# Patient Record
Sex: FEMALE | Race: WHITE | NOT HISPANIC OR LATINO | Employment: STUDENT | URBAN - METROPOLITAN AREA
[De-identification: names, ages, dates, MRNs, and addresses within clinical notes are randomized per-mention and may not be internally consistent; named-entity substitution may affect disease eponyms.]

---
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
## 2023-01-01 ENCOUNTER — OFFICE VISIT (OUTPATIENT)
Dept: URGENT CARE | Facility: CLINIC | Age: 18
End: 2023-01-01
Payer: COMMERCIAL

## 2023-01-01 VITALS
DIASTOLIC BLOOD PRESSURE: 80 MMHG | OXYGEN SATURATION: 99 % | SYSTOLIC BLOOD PRESSURE: 108 MMHG | HEIGHT: 66 IN | RESPIRATION RATE: 18 BRPM | TEMPERATURE: 99 F | BODY MASS INDEX: 24.41 KG/M2 | WEIGHT: 151.88 LBS | HEART RATE: 67 BPM

## 2023-01-01 DIAGNOSIS — H61.23 BILATERAL IMPACTED CERUMEN: Primary | ICD-10-CM

## 2023-01-01 PROCEDURE — 3008F BODY MASS INDEX DOCD: CPT | Mod: CPTII,S$GLB,, | Performed by: NURSE PRACTITIONER

## 2023-01-01 PROCEDURE — 3074F PR MOST RECENT SYSTOLIC BLOOD PRESSURE < 130 MM HG: ICD-10-PCS | Mod: CPTII,S$GLB,, | Performed by: NURSE PRACTITIONER

## 2023-01-01 PROCEDURE — 3074F SYST BP LT 130 MM HG: CPT | Mod: CPTII,S$GLB,, | Performed by: NURSE PRACTITIONER

## 2023-01-01 PROCEDURE — 69209 EAR CERUMEN REMOVAL: ICD-10-PCS | Mod: 50,S$GLB,, | Performed by: NURSE PRACTITIONER

## 2023-01-01 PROCEDURE — 99204 PR OFFICE/OUTPT VISIT, NEW, LEVL IV, 45-59 MIN: ICD-10-PCS | Mod: 25,S$GLB,, | Performed by: NURSE PRACTITIONER

## 2023-01-01 PROCEDURE — 3079F DIAST BP 80-89 MM HG: CPT | Mod: CPTII,S$GLB,, | Performed by: NURSE PRACTITIONER

## 2023-01-01 PROCEDURE — 1159F MED LIST DOCD IN RCRD: CPT | Mod: CPTII,S$GLB,, | Performed by: NURSE PRACTITIONER

## 2023-01-01 PROCEDURE — 1160F RVW MEDS BY RX/DR IN RCRD: CPT | Mod: CPTII,S$GLB,, | Performed by: NURSE PRACTITIONER

## 2023-01-01 PROCEDURE — 3008F PR BODY MASS INDEX (BMI) DOCUMENTED: ICD-10-PCS | Mod: CPTII,S$GLB,, | Performed by: NURSE PRACTITIONER

## 2023-01-01 PROCEDURE — 1160F PR REVIEW ALL MEDS BY PRESCRIBER/CLIN PHARMACIST DOCUMENTED: ICD-10-PCS | Mod: CPTII,S$GLB,, | Performed by: NURSE PRACTITIONER

## 2023-01-01 PROCEDURE — 69209 REMOVE IMPACTED EAR WAX UNI: CPT | Mod: 50,S$GLB,, | Performed by: NURSE PRACTITIONER

## 2023-01-01 PROCEDURE — 99204 OFFICE O/P NEW MOD 45 MIN: CPT | Mod: 25,S$GLB,, | Performed by: NURSE PRACTITIONER

## 2023-01-01 PROCEDURE — 1159F PR MEDICATION LIST DOCUMENTED IN MEDICAL RECORD: ICD-10-PCS | Mod: CPTII,S$GLB,, | Performed by: NURSE PRACTITIONER

## 2023-01-01 PROCEDURE — 3079F PR MOST RECENT DIASTOLIC BLOOD PRESSURE 80-89 MM HG: ICD-10-PCS | Mod: CPTII,S$GLB,, | Performed by: NURSE PRACTITIONER

## 2023-01-01 RX ORDER — LORAZEPAM 0.5 MG/1
0.5 TABLET ORAL 2 TIMES DAILY
Status: ON HOLD | COMMUNITY
Start: 2022-12-16 | End: 2023-03-09 | Stop reason: HOSPADM

## 2023-01-01 RX ORDER — LITHIUM CARBONATE 300 MG/1
300 TABLET, FILM COATED, EXTENDED RELEASE ORAL DAILY
COMMUNITY
Start: 2022-12-16 | End: 2023-03-06

## 2023-01-01 RX ORDER — PROPRANOLOL HYDROCHLORIDE 40 MG/1
40 TABLET ORAL 2 TIMES DAILY
Status: ON HOLD | COMMUNITY
Start: 2022-12-16 | End: 2023-03-09 | Stop reason: HOSPADM

## 2023-01-01 RX ORDER — ARIPIPRAZOLE 10 MG/1
10 TABLET ORAL
COMMUNITY
Start: 2022-09-12 | End: 2023-03-06

## 2023-01-01 NOTE — PROGRESS NOTES
"Subjective:       Patient ID: Alfa Carbajal is a 17 y.o. female.    Vitals:  height is 5' 6" (1.676 m) and weight is 68.9 kg (151 lb 14.4 oz). Her temporal temperature is 98.7 °F (37.1 °C). Her blood pressure is 108/80 and her pulse is 67. Her respiration is 18 and oxygen saturation is 99%.     Chief Complaint: Ear Fullness (Left ear only)    Patient is a 18 yo female who reports L ear fullness, decreased hearing, and headache that has been present for at least a week. Patient reports that her pain is 0/10 and she has taken advil for the mild headache with no other symptoms.     Ear Fullness   There is pain in the left ear. This is a new problem. The current episode started in the past 7 days. The problem occurs constantly. The problem has been unchanged. There has been no fever. The pain is at a severity of 0/10. The patient is experiencing no pain. Associated symptoms include headaches. Pertinent negatives include no abdominal pain, coughing, diarrhea, ear discharge, hearing loss, neck pain, rash, rhinorrhea, sore throat or vomiting. She has tried NSAIDs for the symptoms. The treatment provided no relief. There is no history of a chronic ear infection, hearing loss or a tympanostomy tube.     HENT:  Negative for ear discharge, hearing loss and sore throat.    Neck: Negative for neck pain.   Respiratory:  Negative for cough.    Gastrointestinal:  Negative for abdominal pain, vomiting and diarrhea.   Skin:  Negative for rash.   Neurological:  Positive for headaches.     Objective:      Physical Exam   Constitutional: She is oriented to person, place, and time.  Non-toxic appearance. She does not appear ill. No distress. normal  HENT:   Head: Normocephalic.   Ears:   Right Ear: Tympanic membrane, external ear and ear canal normal. There is cerumen present. No tenderness. Tympanic membrane is not erythematous. No middle ear effusion. Decreased hearing is noted.   Left Ear: Tympanic membrane, external ear and ear " "canal normal. There is cerumen present. No tenderness. Tympanic membrane is not erythematous.  No middle ear effusion. Decreased hearing is noted.   Nose: Nose normal.   Mouth/Throat: Mucous membranes are moist.   Eyes: Pupils are equal, round, and reactive to light.   Cardiovascular: Normal rate and normal pulses.   Pulmonary/Chest: Effort normal.   Abdominal: Normal appearance.   Musculoskeletal: Normal range of motion.         General: Normal range of motion.   Neurological: no focal deficit. She is alert and oriented to person, place, and time.   Skin: Skin is warm, dry and not diaphoretic.   Nursing note and vitals reviewed.    Ear Cerumen Removal    Date/Time: 1/1/2023 2:30 PM  Performed by: Concepción Echeverria NP  Authorized by: Concepción Echeverria NP     Time out: Immediately prior to procedure a "time out" was called to verify the correct patient, procedure, equipment, support staff and site/side marked as required.    Consent Done?:  Yes (Verbal)    Local anesthetic:  None  Ceruminolytics applied: Ceruminolytics applied prior to the procedure    Location details:  Both ears  Procedure type: irrigation    Cerumen  Removal Results:  Cerumen completely removed  Patient tolerance:  Patient tolerated the procedure well with no immediate complications        Assessment:       1. Bilateral impacted cerumen          Plan:         Bilateral impacted cerumen  -     Ear wax removal         Patient Instructions   - You must understand that you have received an Urgent Care treatment only and that you may be released before all of your medical problems are known or treated.   - You, the patient, will arrange for follow up care as instructed.   - If your condition worsens or fails to improve we recommend that you receive another evaluation at the ER immediately or contact your PCP to discuss your concerns.   - You can call [phone] or [phone] to help schedule an appointment with the appropriate " provider.

## 2023-01-01 NOTE — PATIENT INSTRUCTIONS
- You must understand that you have received an Urgent Care treatment only and that you may be released before all of your medical problems are known or treated.   - You, the patient, will arrange for follow up care as instructed.   - If your condition worsens or fails to improve we recommend that you receive another evaluation at the ER immediately or contact your PCP to discuss your concerns.   - You can call [phone] or [phone] to help schedule an appointment with the appropriate provider.

## 2023-01-02 NOTE — PROCEDURES
"Ear Cerumen Removal    Date/Time: 1/1/2023 2:30 PM  Performed by: Concepción Echeverria NP  Authorized by: Concepción Echeverria NP     Time out: Immediately prior to procedure a "time out" was called to verify the correct patient, procedure, equipment, support staff and site/side marked as required.    Consent Done?:  Yes (Verbal)    Local anesthetic:  None  Ceruminolytics applied: Ceruminolytics applied prior to the procedure    Location details:  Both ears  Procedure type: irrigation    Cerumen  Removal Results:  Cerumen completely removed  Patient tolerance:  Patient tolerated the procedure well with no immediate complications  "

## 2023-03-06 ENCOUNTER — HOSPITAL ENCOUNTER (INPATIENT)
Facility: HOSPITAL | Age: 18
LOS: 4 days | Discharge: PSYCHIATRIC OR SUBSTANCE USE FACILITY | DRG: 918 | End: 2023-03-10
Attending: EMERGENCY MEDICINE | Admitting: STUDENT IN AN ORGANIZED HEALTH CARE EDUCATION/TRAINING PROGRAM
Payer: COMMERCIAL

## 2023-03-06 DIAGNOSIS — T50.901A OVERDOSE: ICD-10-CM

## 2023-03-06 DIAGNOSIS — S41.109A: ICD-10-CM

## 2023-03-06 DIAGNOSIS — R07.9 CHEST PAIN: ICD-10-CM

## 2023-03-06 DIAGNOSIS — R00.0 TACHYCARDIA: ICD-10-CM

## 2023-03-06 DIAGNOSIS — T65.91XA INGESTION OF TOXIC SUBSTANCE: ICD-10-CM

## 2023-03-06 PROBLEM — F31.9 BIPOLAR 1 DISORDER: Status: ACTIVE | Noted: 2023-03-06

## 2023-03-06 PROBLEM — T14.91XA SUICIDE ATTEMPT: Status: ACTIVE | Noted: 2023-03-06

## 2023-03-06 PROBLEM — T50.902A INTENTIONAL DRUG OVERDOSE: Status: ACTIVE | Noted: 2023-03-06

## 2023-03-06 LAB
ALBUMIN SERPL BCP-MCNC: 4.5 G/DL (ref 3.5–5.2)
ALP SERPL-CCNC: 92 U/L (ref 55–135)
ALT SERPL W/O P-5'-P-CCNC: 11 U/L (ref 10–44)
AMPHET+METHAMPHET UR QL: NEGATIVE
ANION GAP SERPL CALC-SCNC: 13 MMOL/L (ref 8–16)
APAP SERPL-MCNC: <3 UG/ML (ref 10–20)
AST SERPL-CCNC: 18 U/L (ref 10–40)
B-HCG UR QL: NEGATIVE
BACTERIA #/AREA URNS AUTO: ABNORMAL /HPF
BARBITURATES UR QL SCN>200 NG/ML: NEGATIVE
BASOPHILS # BLD AUTO: 0.02 K/UL (ref 0–0.2)
BASOPHILS NFR BLD: 0.3 % (ref 0–1.9)
BENZODIAZ UR QL SCN>200 NG/ML: NEGATIVE
BILIRUB SERPL-MCNC: 0.7 MG/DL (ref 0.1–1)
BILIRUB UR QL STRIP: NEGATIVE
BUN SERPL-MCNC: 13 MG/DL (ref 6–20)
BUN SERPL-MCNC: 19 MG/DL (ref 6–30)
BZE UR QL SCN: NEGATIVE
CALCIUM SERPL-MCNC: 10 MG/DL (ref 8.7–10.5)
CANNABINOIDS UR QL SCN: ABNORMAL
CHLORIDE SERPL-SCNC: 106 MMOL/L (ref 95–110)
CHLORIDE SERPL-SCNC: 106 MMOL/L (ref 95–110)
CLARITY UR REFRACT.AUTO: CLEAR
CO2 SERPL-SCNC: 20 MMOL/L (ref 23–29)
COLOR UR AUTO: YELLOW
CREAT SERPL-MCNC: 0.9 MG/DL (ref 0.5–1.4)
CREAT SERPL-MCNC: 0.9 MG/DL (ref 0.5–1.4)
CREAT UR-MCNC: 335 MG/DL (ref 15–325)
CTP QC/QA: YES
DIFFERENTIAL METHOD: ABNORMAL
EOSINOPHIL # BLD AUTO: 0 K/UL (ref 0–0.5)
EOSINOPHIL NFR BLD: 0.1 % (ref 0–8)
ERYTHROCYTE [DISTWIDTH] IN BLOOD BY AUTOMATED COUNT: 12.3 % (ref 11.5–14.5)
EST. GFR  (NO RACE VARIABLE): >60 ML/MIN/1.73 M^2
ETHANOL SERPL-MCNC: <10 MG/DL
GLUCOSE SERPL-MCNC: 82 MG/DL (ref 70–110)
GLUCOSE SERPL-MCNC: 91 MG/DL (ref 70–110)
GLUCOSE UR QL STRIP: NEGATIVE
HCT VFR BLD AUTO: 44.7 % (ref 37–48.5)
HCT VFR BLD CALC: 43 %PCV (ref 36–54)
HCV AB SERPL QL IA: NORMAL
HGB BLD-MCNC: 14.4 G/DL (ref 12–16)
HGB UR QL STRIP: NEGATIVE
HIV 1+2 AB+HIV1 P24 AG SERPL QL IA: NORMAL
HYALINE CASTS UR QL AUTO: 0 /LPF
IMM GRANULOCYTES # BLD AUTO: 0.03 K/UL (ref 0–0.04)
IMM GRANULOCYTES NFR BLD AUTO: 0.4 % (ref 0–0.5)
KETONES UR QL STRIP: ABNORMAL
LEUKOCYTE ESTERASE UR QL STRIP: ABNORMAL
LITHIUM SERPL-SCNC: 0.6 MMOL/L (ref 0.6–1.2)
LITHIUM SERPL-SCNC: 0.7 MMOL/L (ref 0.6–1.2)
LITHIUM SERPL-SCNC: 0.8 MMOL/L (ref 0.6–1.2)
LYMPHOCYTES # BLD AUTO: 0.7 K/UL (ref 1–4.8)
LYMPHOCYTES NFR BLD: 9.6 % (ref 18–48)
MAGNESIUM SERPL-MCNC: 2.1 MG/DL (ref 1.6–2.6)
MCH RBC QN AUTO: 29.4 PG (ref 27–31)
MCHC RBC AUTO-ENTMCNC: 32.2 G/DL (ref 32–36)
MCV RBC AUTO: 91 FL (ref 82–98)
METHADONE UR QL SCN>300 NG/ML: NEGATIVE
MICROSCOPIC COMMENT: ABNORMAL
MONOCYTES # BLD AUTO: 0.3 K/UL (ref 0.3–1)
MONOCYTES NFR BLD: 4.4 % (ref 4–15)
NEUTROPHILS # BLD AUTO: 6.6 K/UL (ref 1.8–7.7)
NEUTROPHILS NFR BLD: 85.2 % (ref 38–73)
NITRITE UR QL STRIP: NEGATIVE
NRBC BLD-RTO: 0 /100 WBC
OPIATES UR QL SCN: NEGATIVE
PCP UR QL SCN>25 NG/ML: NEGATIVE
PH UR STRIP: 7 [PH] (ref 5–8)
PLATELET # BLD AUTO: 247 K/UL (ref 150–450)
PMV BLD AUTO: 10.9 FL (ref 9.2–12.9)
POC IONIZED CALCIUM: 1.26 MMOL/L (ref 1.06–1.42)
POC TCO2 (MEASURED): 27 MMOL/L (ref 23–29)
POTASSIUM BLD-SCNC: 6.1 MMOL/L (ref 3.5–5.1)
POTASSIUM SERPL-SCNC: 4 MMOL/L (ref 3.5–5.1)
PROT SERPL-MCNC: 7.5 G/DL (ref 6–8.4)
PROT UR QL STRIP: ABNORMAL
RBC # BLD AUTO: 4.9 M/UL (ref 4–5.4)
RBC #/AREA URNS AUTO: 6 /HPF (ref 0–4)
SALICYLATES SERPL-MCNC: <5 MG/DL (ref 15–30)
SAMPLE: ABNORMAL
SODIUM BLD-SCNC: 138 MMOL/L (ref 136–145)
SODIUM SERPL-SCNC: 139 MMOL/L (ref 136–145)
SP GR UR STRIP: 1.03 (ref 1–1.03)
SQUAMOUS #/AREA URNS AUTO: 3 /HPF
TOXICOLOGY INFORMATION: ABNORMAL
TSH SERPL DL<=0.005 MIU/L-ACNC: 0.44 UIU/ML (ref 0.4–4)
URN SPEC COLLECT METH UR: ABNORMAL
WBC # BLD AUTO: 7.69 K/UL (ref 3.9–12.7)
WBC #/AREA URNS AUTO: 13 /HPF (ref 0–5)

## 2023-03-06 PROCEDURE — 82077 ASSAY SPEC XCP UR&BREATH IA: CPT

## 2023-03-06 PROCEDURE — 87086 URINE CULTURE/COLONY COUNT: CPT

## 2023-03-06 PROCEDURE — P9612 CATHETERIZE FOR URINE SPEC: HCPCS

## 2023-03-06 PROCEDURE — 99291 CRITICAL CARE FIRST HOUR: CPT | Mod: 25,,, | Performed by: EMERGENCY MEDICINE

## 2023-03-06 PROCEDURE — 80337 TRICYCLIC & CYCLICALS 6/MORE: CPT

## 2023-03-06 PROCEDURE — 99291 PR CRITICAL CARE, E/M 30-74 MINUTES: ICD-10-PCS | Mod: 25,,, | Performed by: EMERGENCY MEDICINE

## 2023-03-06 PROCEDURE — 83735 ASSAY OF MAGNESIUM: CPT

## 2023-03-06 PROCEDURE — 99223 1ST HOSP IP/OBS HIGH 75: CPT | Mod: ,,, | Performed by: STUDENT IN AN ORGANIZED HEALTH CARE EDUCATION/TRAINING PROGRAM

## 2023-03-06 PROCEDURE — 80053 COMPREHEN METABOLIC PANEL: CPT

## 2023-03-06 PROCEDURE — 85025 COMPLETE CBC W/AUTO DIFF WBC: CPT | Performed by: EMERGENCY MEDICINE

## 2023-03-06 PROCEDURE — 80047 BASIC METABLC PNL IONIZED CA: CPT

## 2023-03-06 PROCEDURE — 93010 ELECTROCARDIOGRAM REPORT: CPT | Mod: ,,, | Performed by: INTERNAL MEDICINE

## 2023-03-06 PROCEDURE — 96374 THER/PROPH/DIAG INJ IV PUSH: CPT

## 2023-03-06 PROCEDURE — 80178 ASSAY OF LITHIUM: CPT | Performed by: EMERGENCY MEDICINE

## 2023-03-06 PROCEDURE — 12002 RPR S/N/AX/GEN/TRNK2.6-7.5CM: CPT | Mod: ,,, | Performed by: EMERGENCY MEDICINE

## 2023-03-06 PROCEDURE — 80143 DRUG ASSAY ACETAMINOPHEN: CPT

## 2023-03-06 PROCEDURE — 80178 ASSAY OF LITHIUM: CPT | Mod: 91 | Performed by: EMERGENCY MEDICINE

## 2023-03-06 PROCEDURE — 80179 DRUG ASSAY SALICYLATE: CPT

## 2023-03-06 PROCEDURE — 63600175 PHARM REV CODE 636 W HCPCS: Performed by: STUDENT IN AN ORGANIZED HEALTH CARE EDUCATION/TRAINING PROGRAM

## 2023-03-06 PROCEDURE — 99285 EMERGENCY DEPT VISIT HI MDM: CPT | Mod: 25

## 2023-03-06 PROCEDURE — 84443 ASSAY THYROID STIM HORMONE: CPT

## 2023-03-06 PROCEDURE — 25000003 PHARM REV CODE 250

## 2023-03-06 PROCEDURE — 99223 PR INITIAL HOSPITAL CARE,LEVL III: ICD-10-PCS | Mod: ,,, | Performed by: STUDENT IN AN ORGANIZED HEALTH CARE EDUCATION/TRAINING PROGRAM

## 2023-03-06 PROCEDURE — 80307 DRUG TEST PRSMV CHEM ANLYZR: CPT

## 2023-03-06 PROCEDURE — 25000003 PHARM REV CODE 250: Performed by: STUDENT IN AN ORGANIZED HEALTH CARE EDUCATION/TRAINING PROGRAM

## 2023-03-06 PROCEDURE — 81001 URINALYSIS AUTO W/SCOPE: CPT

## 2023-03-06 PROCEDURE — 96361 HYDRATE IV INFUSION ADD-ON: CPT

## 2023-03-06 PROCEDURE — 93010 EKG 12-LEAD: ICD-10-PCS | Mod: ,,, | Performed by: INTERNAL MEDICINE

## 2023-03-06 PROCEDURE — 12002 RPR S/N/AX/GEN/TRNK2.6-7.5CM: CPT

## 2023-03-06 PROCEDURE — 81025 URINE PREGNANCY TEST: CPT

## 2023-03-06 PROCEDURE — 80178 ASSAY OF LITHIUM: CPT | Mod: 91

## 2023-03-06 PROCEDURE — 20600001 HC STEP DOWN PRIVATE ROOM

## 2023-03-06 PROCEDURE — 86803 HEPATITIS C AB TEST: CPT | Performed by: EMERGENCY MEDICINE

## 2023-03-06 PROCEDURE — 12002 PR RESUP NPTERF WND BODY 2.6-7.5 CM: ICD-10-PCS | Mod: ,,, | Performed by: EMERGENCY MEDICINE

## 2023-03-06 PROCEDURE — 87389 HIV-1 AG W/HIV-1&-2 AB AG IA: CPT | Performed by: EMERGENCY MEDICINE

## 2023-03-06 PROCEDURE — 63600175 PHARM REV CODE 636 W HCPCS

## 2023-03-06 PROCEDURE — 93005 ELECTROCARDIOGRAM TRACING: CPT

## 2023-03-06 RX ORDER — DEXTROSE 40 %
30 GEL (GRAM) ORAL
Status: DISCONTINUED | OUTPATIENT
Start: 2023-03-06 | End: 2023-03-10 | Stop reason: HOSPADM

## 2023-03-06 RX ORDER — DEXTROSE 40 %
15 GEL (GRAM) ORAL
Status: DISCONTINUED | OUTPATIENT
Start: 2023-03-06 | End: 2023-03-10 | Stop reason: HOSPADM

## 2023-03-06 RX ORDER — SODIUM CHLORIDE 9 MG/ML
INJECTION, SOLUTION INTRAVENOUS CONTINUOUS
Status: DISCONTINUED | OUTPATIENT
Start: 2023-03-06 | End: 2023-03-08

## 2023-03-06 RX ORDER — PROMETHAZINE HYDROCHLORIDE 25 MG/1
25 TABLET ORAL EVERY 6 HOURS PRN
Status: DISCONTINUED | OUTPATIENT
Start: 2023-03-06 | End: 2023-03-10 | Stop reason: HOSPADM

## 2023-03-06 RX ORDER — ONDANSETRON 2 MG/ML
4 INJECTION INTRAMUSCULAR; INTRAVENOUS EVERY 8 HOURS PRN
Status: DISCONTINUED | OUTPATIENT
Start: 2023-03-06 | End: 2023-03-10 | Stop reason: HOSPADM

## 2023-03-06 RX ORDER — MEDROXYPROGESTERONE ACETATE 10 MG/1
10 TABLET ORAL DAILY
Status: DISCONTINUED | OUTPATIENT
Start: 2023-03-07 | End: 2023-03-07

## 2023-03-06 RX ORDER — ARIPIPRAZOLE 15 MG/1
15 TABLET ORAL DAILY
Status: ON HOLD | COMMUNITY
Start: 2023-01-27 | End: 2023-03-09 | Stop reason: HOSPADM

## 2023-03-06 RX ORDER — ONDANSETRON 2 MG/ML
4 INJECTION INTRAMUSCULAR; INTRAVENOUS
Status: COMPLETED | OUTPATIENT
Start: 2023-03-06 | End: 2023-03-06

## 2023-03-06 RX ORDER — GLUCAGON 1 MG
1 KIT INJECTION
Status: DISCONTINUED | OUTPATIENT
Start: 2023-03-06 | End: 2023-03-10 | Stop reason: HOSPADM

## 2023-03-06 RX ORDER — SODIUM CHLORIDE 0.9 % (FLUSH) 0.9 %
10 SYRINGE (ML) INJECTION EVERY 12 HOURS PRN
Status: DISCONTINUED | OUTPATIENT
Start: 2023-03-06 | End: 2023-03-10 | Stop reason: HOSPADM

## 2023-03-06 RX ORDER — LITHIUM CARBONATE 300 MG/1
900 TABLET, FILM COATED, EXTENDED RELEASE ORAL NIGHTLY
Status: ON HOLD | COMMUNITY
Start: 2023-02-03 | End: 2023-03-09 | Stop reason: HOSPADM

## 2023-03-06 RX ORDER — NALOXONE HCL 0.4 MG/ML
0.02 VIAL (ML) INJECTION
Status: DISCONTINUED | OUTPATIENT
Start: 2023-03-06 | End: 2023-03-10 | Stop reason: HOSPADM

## 2023-03-06 RX ORDER — ACETAMINOPHEN 325 MG/1
650 TABLET ORAL EVERY 4 HOURS PRN
Status: DISCONTINUED | OUTPATIENT
Start: 2023-03-06 | End: 2023-03-10 | Stop reason: HOSPADM

## 2023-03-06 RX ADMIN — ONDANSETRON 4 MG: 2 INJECTION INTRAMUSCULAR; INTRAVENOUS at 01:03

## 2023-03-06 RX ADMIN — SODIUM CHLORIDE: 9 INJECTION, SOLUTION INTRAVENOUS at 09:03

## 2023-03-06 RX ADMIN — SODIUM CHLORIDE, POTASSIUM CHLORIDE, SODIUM LACTATE AND CALCIUM CHLORIDE 1000 ML: 600; 310; 30; 20 INJECTION, SOLUTION INTRAVENOUS at 03:03

## 2023-03-06 RX ADMIN — SODIUM CHLORIDE 1000 ML: 9 INJECTION, SOLUTION INTRAVENOUS at 08:03

## 2023-03-06 RX ADMIN — SODIUM CHLORIDE 1000 ML: 9 INJECTION, SOLUTION INTRAVENOUS at 01:03

## 2023-03-06 NOTE — ED TRIAGE NOTES
Pt arrives via EMS from [facility]. EMS reports pt ingested 3x bottles of Lithium + 2x bottles of Ativan. 97% on RA. Known psychiatric hx. Patient states she stopped taking all of her meds because she refuses to take them, then today she took them all in SI attempt. Patient has cuts noted to both wrist and left upper arm. Patient has hx of self harm.

## 2023-03-06 NOTE — LETTER
March 10, 2023    Sailaja Carbajal  [address]  [place] [address]                   [address] [place] [address]  [facility] [address]  Phone: [phone]   March 10, 2023     Patient: Sailaja Carbajal   YOB: 2005   Date of Visit: 3/6/2023       To Whom it May Concern:    Sailaja Carbajal was admitted to [facility] from 3/6 to 3/10 under the hospital medicine service.    Please excuse her from any classes or work missed.    If you have any questions or concerns, please don't hesitate to call.    Sincerely,         Darshana Spann MD

## 2023-03-06 NOTE — PROVIDER PROGRESS NOTES - EMERGENCY DEPT.
"ED Resident HAND-OFF NOTE:  2:11 PM 3/6/2023  Sailaja Carbajal is a 17 y.o. female who presented to the ED on 3/6/2023 and has been managed by  and Dr. Allen, who reports patient C/O drug overdose. I assumed care of patient from off-going ED physician team at 2:11 PM pending repeat lithium level, final dispo.    On my evaluation, Sailaja Carbajal appears ill and is hemodynamically stable. Thus far, Sailaja Carbajal has received:  Medications   Tdap (BOOSTRIX) vaccine injection 0.5 mL (has no administration in time range)   sodium chloride 0.9% bolus 1,000 mL 1,000 mL (1,000 mLs Intravenous New Bag 3/6/23 1306)   ondansetron injection 4 mg (4 mg Intravenous Given 3/6/23 1329)       On my exam, I appreciate:  /89 (BP Location: Right arm, Patient Position: Lying)   Pulse 99   Temp 98.2 °F (36.8 °C) (Oral)   Resp 20   Ht 5' 6" (1.676 m)   Wt 68.9 kg (152 lb)   SpO2 95%   BMI 24.53 kg/m²     ED Course as of 03/06/23 1453   Mon Mar 06, 2023   1348 Lithium Level: 0.8  Normal  [AB]   1405 POC Creatinine: 0.9  Normal renal function  [AB]   1405 POC Potassium(!): 6.1  Likely hemolysis - no EKG changes, normal renal function.  [AB]   1424 CO2(!): 20 [AW]   1430 Potassium: 4.0 [AW]      ED Course User Index  [AB] Amrit Aj MD  [AW] Jeancarlos Allen MD      Lac Repair    Date/Time: 3/6/2023 2:55 PM  Performed by: Juan A Rangel MD  Authorized by: Amrit Aj MD     Consent:     Consent obtained:  Verbal  Laceration details:     Location:  Shoulder/arm    Shoulder/arm location:  L lower arm    Length (cm):  4  Exploration:     Wound extent: no muscle damage noted, no tendon damage noted and no vascular damage noted    Treatment:     Amount of cleaning:  Standard    Irrigation solution:  Sterile saline    Irrigation method:  Pressure wash    Debridement:  None  Skin repair:     Repair method:  Sutures    Suture size:  4-0    Suture material:  Nylon    Number of " sutures:  4  Approximation:     Approximation:  Close  Repair type:     Repair type:  Simple  Post-procedure details:     Dressing:  Open (no dressing)    Procedure completion:  Tolerated well, no immediate complications      Additional ED course:  2:56 PM  Upon re-evaluation, patient is sleepy appearing but answering questions, easily arousable.  Hemodynamically stable.  Repeat lithium, x-ray of the abdomen ordered as well as additional IV fluids.  Laceration repaired.  Repeat lithium level not significantly up trending.  Patient admitted to step-down unit    Disposition:  Admit  I have discussed and counseled Sailaja Carbajal regarding exam, results, diagnosis, treatment, and plan.  ______________________  Juan A Rangel MD   Emergency Medicine Resident  3/6/2023      Attending Note:  Physician Attestation Statement: I have personally seen and examined this patient. As the supervising MD I agree with the above history. As the supervising MD I agree with the above PE. As the supervising MD I agree with the above medical decision making, treatment, course, plan, and disposition.  I was present for the key moments of laceration repair and was immediately available otherwise.    Critical Care   Date: 03/06/2023  Performed by: Wilmer Rollins MD   Authorized by: Wilmer Rollins MD    Total critical care time (exclusive of procedural time) : 30 minutes  Critical care was necessary to treat or prevent imminent or life-threatening deterioration of the following conditions:  overdose

## 2023-03-06 NOTE — ED NOTES
I-STAT Chem-8+ Results:   Value Reference Range   Sodium 138 136-145 mmol/L   Potassium  6.1 3.5-5.1 mmol/L   Chloride 106  mmol/L   Ionized Calcium 1.26 1.06-1.42 mmol/L   CO2 (measured) 27 23-29 mmol/L   Glucose 82  mg/dL   BUN 19 6-30 mg/dL   Creatinine 0.9 0.5-1.4 mg/dL   Hematocrit 43 36-54%

## 2023-03-06 NOTE — ED NOTES
Pt's security envelope and clothes given to her aunt to bring home. Pt has no belongings in the ED.

## 2023-03-06 NOTE — ED NOTES
Pt is followed by Jodee almeida at [facility] and Candy William MD at [facility].

## 2023-03-06 NOTE — ED NOTES
Pt belongings: shirt, pants, shoes, sweatshirt. All locked in the closet.   Security envelope # [id number] wallet and keys. No money, ID and several cards. Locked in the [facility] safe.

## 2023-03-06 NOTE — ED PROVIDER NOTES
Encounter Date: 3/6/2023       History     Chief Complaint   Patient presents with    Drug Overdose     Pt via EMS from [facility]. EMS reports pt ingested 3x bottles of Lithium + 2x bottles of Ativan. 97% on RA. Known psychiatric hx.     The history is provided by the patient and medical records. No  was used.   Sailaja Carbajal is a 17 y.o. female with history of bipolar 1, anorexia, depression, presenting with chief complaint of drug overdose.  Patient states that she is prescribed lithium, Ativan, aripiprazole for bipolar 1 and anorexia.  She is only been taking her aripiprazole for the past few weeks to months.  Patient states at about 11:00 a.m. on 03/06/2023 she took 3 bottles of 300 mg lithium tablets and 2 bottles of 10 mg Ativan tablets.  Patient has been depressed recently and admits this was a suicide attempt.  She does not have a history of prior suicide attempts but has significant history of self-harm including self cutting.  She also used a  to cut her forearms.     Review of patient's allergies indicates:  No Known Allergies  Past Medical History:   Diagnosis Date    Anxiety     Depression      History reviewed. No pertinent surgical history.  Family History   Problem Relation Age of Onset    No Known Problems Father     No Known Problems Mother      Social History     Tobacco Use    Smoking status: Never     Passive exposure: Never    Smokeless tobacco: Never   Substance Use Topics    Alcohol use: Never    Drug use: Never     Review of Systems   Skin:  Positive for wound.   Psychiatric/Behavioral:  Positive for self-injury and suicidal ideas.      Physical Exam     Initial Vitals   BP Pulse Resp Temp SpO2   03/06/23 1252 03/06/23 1252 03/06/23 1252 03/06/23 1335 03/06/23 1252   116/76 (!) 116 16 98.2 °F (36.8 °C) 97 %      MAP       --                Physical Exam    Nursing note and vitals reviewed.  Constitutional: She appears  well-developed and well-nourished. She is not diaphoretic. No distress.   Fatigued but arousable   HENT:   Head: Normocephalic and atraumatic.   Mouth/Throat: Oropharynx is clear and moist.   Protecting airway, carries on conversation, speaking full sentences   Eyes: Conjunctivae are normal. Pupils are equal, round, and reactive to light. No scleral icterus.   3 mm equal round reactive pupils   Neck: Neck supple. No tracheal deviation present.   Cardiovascular:  Regular rhythm, normal heart sounds and intact distal pulses.     Exam reveals no gallop and no friction rub.       No murmur heard.  Tachycardia   Pulmonary/Chest: Breath sounds normal. No stridor. No respiratory distress. She has no wheezes. She has no rhonchi. She has no rales.   Abdominal: Abdomen is soft. Bowel sounds are normal. She exhibits no distension. There is no abdominal tenderness.   Musculoskeletal:         General: No tenderness or edema.      Cervical back: Neck supple.     Neurological: She is alert and oriented to person, place, and time. GCS score is 15. GCS eye subscore is 4. GCS verbal subscore is 5. GCS motor subscore is 6.   Drowsy   Skin: Skin is warm and dry. Capillary refill takes less than 2 seconds. No rash noted. No erythema.   Psychiatric: She has a normal mood and affect. Thought content normal.       ED Course   Procedures  Labs Reviewed   COMPREHENSIVE METABOLIC PANEL - Abnormal; Notable for the following components:       Result Value    CO2 20 (*)     All other components within normal limits   URINALYSIS, REFLEX TO URINE CULTURE - Abnormal; Notable for the following components:    Protein, UA 1+ (*)     Ketones, UA 3+ (*)     Leukocytes, UA Trace (*)     All other components within normal limits    Narrative:     Specimen Source->Urine   DRUG SCREEN PANEL, URINE EMERGENCY - Abnormal; Notable for the following components:    THC Presumptive Positive (*)     Creatinine, Urine 335.0 (*)     All other components within normal  limits    Narrative:     Specimen Source->Urine   ACETAMINOPHEN LEVEL - Abnormal; Notable for the following components:    Acetaminophen (Tylenol), Serum <3.0 (*)     All other components within normal limits   SALICYLATE LEVEL - Abnormal; Notable for the following components:    Salicylate Lvl <5.0 (*)     All other components within normal limits   CBC W/ AUTO DIFFERENTIAL - Abnormal; Notable for the following components:    Lymph # 0.7 (*)     Gran % 85.2 (*)     Lymph % 9.6 (*)     All other components within normal limits   URINALYSIS MICROSCOPIC - Abnormal; Notable for the following components:    RBC, UA 6 (*)     WBC, UA 13 (*)     All other components within normal limits    Narrative:     Specimen Source->Urine   ISTAT PROCEDURE - Abnormal; Notable for the following components:    POC Potassium 6.1 (*)     All other components within normal limits   POCT URINE PREGNANCY - Normal   CULTURE, URINE   HIV 1 / 2 ANTIBODY    Narrative:     Release to patient->Immediate   HEPATITIS C ANTIBODY    Narrative:     Release to patient->Immediate   TSH   ALCOHOL,MEDICAL (ETHANOL)   LITHIUM LEVEL   MAGNESIUM   LITHIUM LEVEL   LITHIUM LEVEL   TRICYCLIC ANTIDEPRESSANT SCREEN (REF LAB)   ISTAT CHEM8     EKG Readings: (Independently Interpreted)   Initial Reading: No STEMI. Rhythm: Sinus Tachycardia. Heart Rate: 110. Ectopy: No Ectopy. Conduction: Normal. ST Segments: Normal ST Segments. T Waves: Normal. Clinical Impression: Sinus Tachycardia   QRS 90     ECG Results              EKG 12-lead (Final result)  Result time 03/06/23 14:20:23      Final result by Interface, Lab In [facility] (03/06/23 14:20:23)                   Narrative:    Test Reason : T50.901A,    Vent. Rate : 110 BPM     Atrial Rate : 110 BPM     P-R Int : 142 ms          QRS Dur : 090 ms      QT Int : 334 ms       P-R-T Axes : 080 080 056 degrees     QTc Int : 452 ms    Sinus tachycardia  Biatrial enlargement  Right ventricular conduction  delay  Abnormal ECG  No previous ECGs available  Confirmed by Hailey Garcia MD ([id number]) on 3/6/2023 2:20:16 PM    Referred By: System System           Confirmed By:Hailey Garcia MD                                  Imaging Results              X-Ray Abdomen AP 1 View (KUB) (Final result)  Result time 03/06/23 15:44:11      Final result by Bienvenido Can MD (03/06/23 15:44:11)                   Impression:      1. Nonobstructive bowel gas pattern.      Electronically signed by: Bienvenido Can MD  Date:    03/06/2023  Time:    15:44               Narrative:    EXAMINATION:  XR ABDOMEN AP 1 VIEW    CLINICAL HISTORY:  Toxic effect of unspecified substance, accidental (unintentional), initial encounter    TECHNIQUE:  AP View(s) of the abdomen was performed.    COMPARISON:  None    FINDINGS:  Single-view abdomen supine.    Air and stool is seen within the large bowel and projected over the rectum.  No focally dilated small bowel loops.  There are no calcifications to convincingly suggest nephrolithiasis or cholelithiasis.  There is levo scoliotic curvature of the spine.  No findings to suggest pneumatosis.                                       X-Ray Forearm Left (Final result)  Result time 03/06/23 15:44:59      Final result by Bienvenido Can MD (03/06/23 15:44:59)                   Impression:      As above      Electronically signed by: Bienvenido Can MD  Date:    03/06/2023  Time:    15:44               Narrative:    EXAMINATION:  XR FOREARM LEFT    CLINICAL HISTORY:  Unspecified open wound of unspecified upper arm, initial encounter    TECHNIQUE:  AP and lateral views of the left forearm were performed.    COMPARISON:  None    FINDINGS:  Two views left forearm.    No acute displaced fracture or dislocation of the forearm.  No radiopaque foreign body.  There is subcutaneous emphysema along the distal ulnar aspect of the forearm, correlation with any laceration in the region.                                        Medications   Tdap (BOOSTRIX) vaccine injection 0.5 mL (has no administration in time range)   sodium chloride 0.9% bolus 1,000 mL 1,000 mL (0 mLs Intravenous Stopped 3/6/23 1406)   ondansetron injection 4 mg (4 mg Intravenous Given 3/6/23 1329)   lactated ringers bolus 1,000 mL (0 mLs Intravenous Stopped 3/6/23 1736)     Medical Decision Making:   History:   I obtained history from: someone other than patient and EMS provider.       <> Summary of History: Took 3 bottles of lithium and 2 bottles of Ativan  Old Medical Records: I decided to obtain old medical records.  Initial Assessment:   17-year-old female with history of bipolar disorder here after suicide attempt by toxic ingestion  Differential Diagnosis:   Including, but not limited to: Intentional polysubstance overdose, intentional single substance overdose, laceration   Independently Interpreted Test(s):   I have ordered and independently interpreted EKG Reading(s) - see prior notes  Clinical Tests:   Lab Tests: Ordered and Reviewed  Radiological Study: Ordered and Reviewed  Medical Tests: Reviewed and Ordered  ED Management:  Toxicology/psych workup started for an acute presentation of an emergent condition with multiple comorbidities.  Upon arrival to the ED patient is alert but tired and is protecting her airway. She was PEC'd for suicidality and suicide attempt.  She is hemodynamically stable at this time and has minimal GI complaints, Zofran given for nausea.  Discussed with Poison Control who recommended against activated charcoal or total bowel irrigation due to patient's depressed med status and normal initial lithium level at 0.8.  Will repeat lithium level 6 hours after ingestion and reassess need for whole bowel irrigation and/or dialysis at that time.  Lithium level scheduled for 1700 which would be 6 hours after reported ingestion.  Patient kept on intensive cardiopulmonary monitoring while in the ED. Patient signed out to oncoming team  at 2:00 p.m. pending labs and repeat lithium level which will determine final disposition.  Other:   I have discussed this case with another health care provider.       <> Summary of the Discussion: Poison control          Attending Attestation:   Physician Attestation Statement for Resident:  As the supervising MD   Physician Attestation Statement: I have personally seen and examined this patient.   I agree with the above history.  -: 16 yo female presenting from [facility] for intentional overdose.  Reports taking lithium and ativan.  Also prescribed Abilify, propranolol.  Used a  to cut her forearms.     As the supervising MD I agree with the above PE.   -: Sleeping, but wakes easily to voice  Scars to left shoulder and right upper leg  Superficial abrasions to right forearm  Superficial abrasions and 2 cm laceration to left forearm - no active bleeding, no tendon exposure, full extension/flexion of wrist  Tachycardia, lungs clear  Abdomen non-tender  Mood is depressed, she is evasive    As the supervising MD I agree with the above treatment, course, plan, and disposition.   -: Considered intubation, but not clinically indicated at this time, protecting airway and conversive once awake.   EKG sinus tachycardia.  Do not suspect BB OD given she is tachycardic.   Case discussed with Poison Control.  Considered risk/benefit of both activated charcoal and WBI, but due to aspiration risk and initial negative lithium level, will hold.  Expecting lithium level to be elevated given reported amount ingested about 2 hours PTA, but will trend closely for increase and reassess decision for WBI.  PEC placed out of concern patient represents a danger to herself, as evidenced by reported ingestion and e/o self harm on exam.  Signed out to oncoming team at 2p with repeat lithium level, remaining labs, laceration repair pending.  Tdap updated.  Xray pending to r/o FB or bony injury to left forearm.    Anticipate admission to Hospital Medicine, although would monitor clinical status closely for ICU need.    I have reviewed and agree with the residents interpretation of the following: lab data and EKG.  I have reviewed the following: old records at this facility.              ED Course as of 03/06/23 1841   Mon Mar 06, 2023   1348 Lithium Level: 0.8  Normal  [AB]   1405 POC Creatinine: 0.9  Normal renal function  [AB]   1405 POC Potassium(!): 6.1  Likely hemolysis - no EKG changes, normal renal function.  [AB]   1424 CO2(!): 20 [AW]   1430 Potassium: 4.0 [AW]      ED Course User Index  [AB] Amrit Aj MD  [AW] Jeancarlos Allen MD                 Clinical Impression:   Final diagnoses:  [T50.901A] Overdose  [S41.109A] Open wound of arm  [T65.91XA] Ingestion of toxic substance        ED Disposition Condition    Admit                 Jeancarlos Allen MD  Resident  03/06/23 1431       Amrit Aj MD  03/06/23 1849

## 2023-03-06 NOTE — ED NOTES
Patient placed in blue paper scrubs, patient placed on cardiac monitor, bp cuff, and continuous pulse ox. Sitter at bedside.

## 2023-03-07 PROBLEM — R63.0 ANOREXIA: Status: ACTIVE | Noted: 2023-03-07

## 2023-03-07 LAB
ALBUMIN SERPL BCP-MCNC: 3.3 G/DL (ref 3.5–5.2)
ALP SERPL-CCNC: 74 U/L (ref 55–135)
ALT SERPL W/O P-5'-P-CCNC: 9 U/L (ref 10–44)
ANION GAP SERPL CALC-SCNC: 8 MMOL/L (ref 8–16)
AST SERPL-CCNC: 14 U/L (ref 10–40)
BACTERIA UR CULT: NO GROWTH
BASOPHILS # BLD AUTO: 0.04 K/UL (ref 0–0.2)
BASOPHILS NFR BLD: 0.5 % (ref 0–1.9)
BILIRUB SERPL-MCNC: 0.9 MG/DL (ref 0.1–1)
BUN SERPL-MCNC: 13 MG/DL (ref 6–20)
CALCIUM SERPL-MCNC: 8.7 MG/DL (ref 8.7–10.5)
CHLORIDE SERPL-SCNC: 114 MMOL/L (ref 95–110)
CO2 SERPL-SCNC: 20 MMOL/L (ref 23–29)
CREAT SERPL-MCNC: 0.8 MG/DL (ref 0.5–1.4)
DIFFERENTIAL METHOD: NORMAL
EOSINOPHIL # BLD AUTO: 0.1 K/UL (ref 0–0.5)
EOSINOPHIL NFR BLD: 1.3 % (ref 0–8)
ERYTHROCYTE [DISTWIDTH] IN BLOOD BY AUTOMATED COUNT: 11.9 % (ref 11.5–14.5)
EST. GFR  (NO RACE VARIABLE): >60 ML/MIN/1.73 M^2
GLUCOSE SERPL-MCNC: 54 MG/DL (ref 70–110)
HCT VFR BLD AUTO: 38.8 % (ref 37–48.5)
HGB BLD-MCNC: 12.4 G/DL (ref 12–16)
IMM GRANULOCYTES # BLD AUTO: 0.03 K/UL (ref 0–0.04)
IMM GRANULOCYTES NFR BLD AUTO: 0.4 % (ref 0–0.5)
LYMPHOCYTES # BLD AUTO: 1.8 K/UL (ref 1–4.8)
LYMPHOCYTES NFR BLD: 20.9 % (ref 18–48)
MAGNESIUM SERPL-MCNC: 1.8 MG/DL (ref 1.6–2.6)
MCH RBC QN AUTO: 29.8 PG (ref 27–31)
MCHC RBC AUTO-ENTMCNC: 32 G/DL (ref 32–36)
MCV RBC AUTO: 93 FL (ref 82–98)
MONOCYTES # BLD AUTO: 0.7 K/UL (ref 0.3–1)
MONOCYTES NFR BLD: 8 % (ref 4–15)
NEUTROPHILS # BLD AUTO: 5.8 K/UL (ref 1.8–7.7)
NEUTROPHILS NFR BLD: 68.9 % (ref 38–73)
NRBC BLD-RTO: 0 /100 WBC
PLATELET # BLD AUTO: 182 K/UL (ref 150–450)
PMV BLD AUTO: 9.9 FL (ref 9.2–12.9)
POCT GLUCOSE: 53 MG/DL (ref 70–110)
POCT GLUCOSE: 95 MG/DL (ref 70–110)
POTASSIUM SERPL-SCNC: 3.8 MMOL/L (ref 3.5–5.1)
PROT SERPL-MCNC: 5.5 G/DL (ref 6–8.4)
RBC # BLD AUTO: 4.16 M/UL (ref 4–5.4)
SODIUM SERPL-SCNC: 142 MMOL/L (ref 136–145)
WBC # BLD AUTO: 8.42 K/UL (ref 3.9–12.7)

## 2023-03-07 PROCEDURE — 20600001 HC STEP DOWN PRIVATE ROOM

## 2023-03-07 PROCEDURE — 25000003 PHARM REV CODE 250: Performed by: STUDENT IN AN ORGANIZED HEALTH CARE EDUCATION/TRAINING PROGRAM

## 2023-03-07 PROCEDURE — 99232 PR SUBSEQUENT HOSPITAL CARE,LEVL II: ICD-10-PCS | Mod: ,,, | Performed by: STUDENT IN AN ORGANIZED HEALTH CARE EDUCATION/TRAINING PROGRAM

## 2023-03-07 PROCEDURE — 85025 COMPLETE CBC W/AUTO DIFF WBC: CPT | Performed by: STUDENT IN AN ORGANIZED HEALTH CARE EDUCATION/TRAINING PROGRAM

## 2023-03-07 PROCEDURE — 99232 SBSQ HOSP IP/OBS MODERATE 35: CPT | Mod: ,,, | Performed by: STUDENT IN AN ORGANIZED HEALTH CARE EDUCATION/TRAINING PROGRAM

## 2023-03-07 PROCEDURE — 99222 PR INITIAL HOSPITAL CARE,LEVL II: ICD-10-PCS | Mod: ,,, | Performed by: PSYCHIATRY & NEUROLOGY

## 2023-03-07 PROCEDURE — 36415 COLL VENOUS BLD VENIPUNCTURE: CPT | Performed by: STUDENT IN AN ORGANIZED HEALTH CARE EDUCATION/TRAINING PROGRAM

## 2023-03-07 PROCEDURE — 83735 ASSAY OF MAGNESIUM: CPT | Performed by: STUDENT IN AN ORGANIZED HEALTH CARE EDUCATION/TRAINING PROGRAM

## 2023-03-07 PROCEDURE — 99222 1ST HOSP IP/OBS MODERATE 55: CPT | Mod: ,,, | Performed by: PSYCHIATRY & NEUROLOGY

## 2023-03-07 PROCEDURE — 80053 COMPREHEN METABOLIC PANEL: CPT | Performed by: STUDENT IN AN ORGANIZED HEALTH CARE EDUCATION/TRAINING PROGRAM

## 2023-03-07 RX ADMIN — SODIUM CHLORIDE: 9 INJECTION, SOLUTION INTRAVENOUS at 04:03

## 2023-03-07 RX ADMIN — DEXTROSE MONOHYDRATE 125 ML: 100 INJECTION, SOLUTION INTRAVENOUS at 08:03

## 2023-03-07 NOTE — ASSESSMENT & PLAN NOTE
Benzodiazepine and lithium overdose  7 y.o. female with history of bipolar 1, anorexia, depression, presented with complaints of drug overdose. Patient reported that she is prescribed lithium, Ativan, aripiprazole for bipolar 1 and anorexia.  At about 11:00 a.m. on 03/06/2023 she took 3 bottles of 300 mg lithium tablets and 2 bottles of 10 mg Ativan tablets.  Patient has been depressed recently and admits this was a suicide attempt.  She does not have a history of prior suicide attempts but has significant history of self-harm including self cutting.  She have used a  to cut her forearms.      hemodynamically stable at the moment. Maintaining airway. Drowsy but easily arousable.     Creatinine 0.9.  Bicarb 20.  Glucose 91.  TSH 0.436.  Salicylate and Tylenol levels negative.    Lithium level 0.8, 0.7 10.6.    Urine drug test positive for marijuana metabolite   Xray abd with Nonobstructive bowel gas pattern.   EKG with sinus tachycardia.    ED physician discussed with  who recommended step-down admission.  No indication for emergent dialysis at this time.  Patient was PEC'd in ED.    Admitted to stepdown for close monitoring. Continue to monitor vitals  Aggressive IVF hydration  Continue supportive management  Psych consult

## 2023-03-07 NOTE — MEDICAL/APP STUDENT
"CONSULTATION LIAISON PSYCHIATRY INITIAL EVALUATION    Patient Name: Sailaja Carbajal  MRN: [id number]  Patient Class: IP- Inpatient  Admission Date: 3/6/2023  Attending Physician: Naga Chan MD      HPI:   Sailaja Carbajal is a 17 y.o. female with past psychiatric history of anorexia, bipolar I, depression presents to the ED/admitted to the hospital for Intentional drug overdose.    Psychiatry consulted for SI    On psych exam, patient appears tearful but alert and oriented       Collateral with patient's permission:   ***    Medical Review of Systems:  {Review Of Systems:76074}    Psychiatric Review of Systems (is patient experiencing or having changes in):  sleep: {YES/NO:87997::"no"}  appetite: {YES/NO:31212::"no"}  weight: {YES/NO:74578::"no"}  energy/anergy: {YES/NO:55618::"no"}  interest/pleasure/anhedonia: {YES/NO:60624::"no"}  somatic symptoms: {YES/NO:38760::"no"}  libido: {YES/NO:40376::"no"}  anxiety/panic: {YES/NO:40571::"no"}  guilty/hopelessness: {YES/NO:00135::"no"}  concentration: {YES/NO:94057::"no"}  Oanh:{YES/NO:37733::"no"}  Psychosis: {YES/NO:81013::"no"}  Trauma: {YES/NO:19655::"no"}  S.I.B.s/risky behavior: {YES/NO:45695::"no"}    Past Psychiatric History:  Previous Medication Trials: {YES/NO/WILD CARDS:85105}  Previous Psychiatric Hospitalizations:{YES/NO/WILD CARDS:41820}   Previous Suicide Attempts: {YES/NO/WILD CARDS:29153}  History of Violence: {YES/NO/WILD CARDS:31613}  Outpatient Psychiatrist: {YES/NO/WILD CARDS:15183}  Family Psychiatric History: {YES/NO/WILD CARDS:26845}    Substance Abuse History (with emphasis over the last 12 months):  Recreational Drugs: {recreationaldrugs:72371}  Use of Alcohol: {etoh use:76277}  Tobacco Use:{YES/NO/WILD CARDS:88397}  Rehab History:{YES/NO/WILD CARDS:98411}    Social History:  Marital Status: {GEN; MARITAL STATUS:70659}  Children: {NUMBERS 0-12:13578}  Employment Status/Info: {DESC; EMPLOYMENT " "STATUS:94574}  :{YES/NO/WILD CARDS:24116}  Education: {misc; education:27392}  Special Ed: {YES/NO/WILD CARDS:34626}  Housing Status: {Living arrangements:59544}  Access to gun: {YES/NO/WILD CARDS:90973}  Psychosocial Stressors: { :64091}  Functioning Relationships: {Psychfxinrelationships:16811}    Legal History:  Past Charges/Incarcerations: {YES/NO/WILD CARDS:03136}  Pending charges:{YES/NO/WILD CARDS:80143}    Mental Status Exam:  Arousal: {EXAM; NEURO ALERTNESS:12796}  Sensorium/Orientation: oriented to {MD PHYSICAL EXAM PSYCHIATRIC PERSON/PLACE/DATE/TIME:66197::"grossly intact"}  Behavior/Cooperation: {exam; behavior :18112::"normal","cooperative"}   Speech: {findings; speech psych:15828::"normal tone","normal rate","normal pitch","normal volume"}  Language: {EXAM; AMB PSYCH LANGUAGE:20234::"grossly intact"}  Gait and Station: {Drop Down:14554}  Involuntary Movements and Motor Activity: {Drop Down:31568}  Mood: " *** "   Affect: {PSY IP AFFECT/MOOD OHS:93029}  Thought Process: {thought process:11735::"normal and logical"}  Associations: {Drop Down:84231}  Thought Content: {normal:94164::"normal, no suicidality, no homicidality, delusions, or paranoia"}   Attention/Concentration:  {EXAM; ATTENTION SPAN WORLD:63554}  Memory:    Recent:  {[place]-Ofhkwttrd-Ytgfbw:58205::"Intact"}   Remote: {Vnblfo-Yrumllblm-Yqtzyw:29043::"Intact"}   ***/3 immediate, ***/3 at 5 minutes  Fund of Knowledge: {PSY - FUND OF KNOWLEDGE:34442}   Abstract reasoning: {MISC; ABSTRACT/CONCRETE:32605}  Insight: {insight:34217}  Judgment: {JUDGMENT:76174::"behavior is adequate to circumstances"}     CAM ICU positive? {YES/NO/WILD CARDS:96237}      ASSESSMENT & RECOMMENDATIONS   (Please list each relevant SPECIFIC psychiatric DSMV or medical diagnosis and recs for it under the listing DO NOT WRITE AN IMPRESSION PARAGRAPH!!)    MDD mild/moderate/severe, BIPOLAR I/II, Unspecified mood etc  PSYCH MEDICATIONS  Scheduled  PRN  OR doesn't " warrant any med management in the inpatient setting defer to outpatient    KAREN/panic d/o, adjustment d/o etc  PSYCH MEDICATIONS  Scheduled  PRN  OR doesn't warrant any med management in the inpatient setting defer to outpatient    Schizophrenia, schizoaffective, delusional d/o, acute psychosis etc  PSYCH MEDICATIONS  Scheduled  PRN  OR doesn't warrant any med management in the inpatient setting defer to outpatient    Dementia, parkinson's, pseudo dementia etc  PSYCH MEDICATIONS  Scheduled  PRN  OR doesn't warrant any med management in the inpatient setting defer to outpatient    DELIRIUM  DELIRIUM BEHAVIOR MANAGEMENT  PLEASE utilize CHEMICAL restraints with PRN meds first for agitation. Minimize use of PHYSICAL restraints OR have periods of being out of physical restraints if possible.  Keep window shades open and room lit during day and room dim at night in order to promote normal sleep-wake cycles  Encourage family at bedside. [place] patient often to situation, location, date.  Continue to Limit or Discontinue use of Narcotics, Benzos and Anti-cholinergic medications as they may worsen delirium.  Continue medical workup for causative etiology of Delirium.     OTHER PERTINENT DIAGNOSIS    RISK ASSESSMENT  NEEDS PEC because patient is in imminent danger of hurting self or others and is gravely disabled. & NEEDS 1:1 sitter  NO NEED FOR PEC patient NOT in any imminent danger of hurting self or others and not gravely disabled.     FOLLOW UP  Will follow up while in house  Will sign off. Patient can follow up with ***/outpatient mental health provider. Resources provided in patient's discharge instructions.    DISPOSITION - once medically cleared:   Seek involuntary inpatient psychiatric admission for stabilization of acute psychiatric symptoms and a safe disposition plan is enacted. The patient &/or their family was informed that the patient will be transferred to an inpatient unit per ED/primary placement team.   OR   Patient may be discharged home with next of kin with outpatient psychaitric follow up/rehab.   OR  Defer to medical team    Please contact ON CALL psychiatry service (24/7) for any acute issues that may arise.    Dr. Jean Lund   Psychiatry  [facility]  3/7/2023 9:11 AM        --------------------------------------------------------------------------------------------------------------------------------------------------------------------------------------------------------------------------------------    CONTINUED HISTORY & OBJECTIVE clinical data & findings reviewed and noted for above decision making    Past Medical/Surgical History:   Past Medical History:   Diagnosis Date    Anxiety     Depression      History reviewed. No pertinent surgical history.    Current Medications:   Scheduled Meds:   PRN Meds: acetaminophen, dextrose 10%, dextrose 10%, dextrose, dextrose, glucagon (human recombinant), naloxone, ondansetron, promethazine, sodium chloride 0.9%, DIPH,PERTUSS(ACELL),TET VACCINE (ADULT)(BOOSTRIX,ADACEL)  OTC Meds: ***    Allergies:   Review of patient's allergies indicates:  No Known Allergies    Vitals  Vitals:    03/07/23 0700   BP: 107/64   Pulse: 107   Resp: 16   Temp: 98.6 °F (37 °C)       Labs/Imaging/Studies:  Recent Results (from the past 24 hour(s))   HIV 1/2 Ag/Ab (4th Gen)    Collection Time: 03/06/23  1:05 PM   Result Value Ref Range    HIV 1/2 Ag/Ab Non-reactive Non-reactive   Hepatitis C Antibody    Collection Time: 03/06/23  1:05 PM   Result Value Ref Range    Hepatitis C Ab Non-reactive Non-reactive   Comprehensive metabolic panel    Collection Time: 03/06/23  1:05 PM   Result Value Ref Range    Sodium 139 136 - 145 mmol/L    Potassium 4.0 3.5 - 5.1 mmol/L    Chloride 106 95 - 110 mmol/L    CO2 20 (L) 23 - 29 mmol/L    Glucose 91 70 - 110 mg/dL    BUN 13 6 - 20 mg/dL    Creatinine 0.9 0.5 - 1.4 mg/dL    Calcium 10.0 8.7 - 10.5 mg/dL    Total Protein 7.5 6.0 - 8.4 g/dL     Albumin 4.5 3.5 - 5.2 g/dL    Total Bilirubin 0.7 0.1 - 1.0 mg/dL    Alkaline Phosphatase 92 55 - 135 U/L    AST 18 10 - 40 U/L    ALT 11 10 - 44 U/L    Anion Gap 13 8 - 16 mmol/L    eGFR >60.0 >60 mL/min/1.73 m^2   TSH    Collection Time: 03/06/23  1:05 PM   Result Value Ref Range    TSH 0.436 0.400 - 4.000 uIU/mL   Ethanol    Collection Time: 03/06/23  1:05 PM   Result Value Ref Range    Alcohol, Serum <10 <10 mg/dL   Acetaminophen level    Collection Time: 03/06/23  1:05 PM   Result Value Ref Range    Acetaminophen (Tylenol), Serum <3.0 (L) 10.0 - 20.0 ug/mL   Salicylate level    Collection Time: 03/06/23  1:05 PM   Result Value Ref Range    Salicylate Lvl <5.0 (L) 15.0 - 30.0 mg/dL   Lithium level    Collection Time: 03/06/23  1:05 PM   Result Value Ref Range    Lithium Level 0.8 0.6 - 1.2 mmol/L   Magnesium    Collection Time: 03/06/23  1:05 PM   Result Value Ref Range    Magnesium 2.1 1.6 - 2.6 mg/dL   CBC auto differential    Collection Time: 03/06/23  1:54 PM   Result Value Ref Range    WBC 7.69 3.90 - 12.70 K/uL    RBC 4.90 4.00 - 5.40 M/uL    Hemoglobin 14.4 12.0 - 16.0 g/dL    Hematocrit 44.7 37.0 - 48.5 %    MCV 91 82 - 98 fL    MCH 29.4 27.0 - 31.0 pg    MCHC 32.2 32.0 - 36.0 g/dL    RDW 12.3 11.5 - 14.5 %    Platelets 247 150 - 450 K/uL    MPV 10.9 9.2 - 12.9 fL    Immature Granulocytes 0.4 0.0 - 0.5 %    Gran # (ANC) 6.6 1.8 - 7.7 K/uL    Immature Grans (Abs) 0.03 0.00 - 0.04 K/uL    Lymph # 0.7 (L) 1.0 - 4.8 K/uL    Mono # 0.3 0.3 - 1.0 K/uL    Eos # 0.0 0.0 - 0.5 K/uL    Baso # 0.02 0.00 - 0.20 K/uL    nRBC 0 0 /100 WBC    Gran % 85.2 (H) 38.0 - 73.0 %    Lymph % 9.6 (L) 18.0 - 48.0 %    Mono % 4.4 4.0 - 15.0 %    Eosinophil % 0.1 0.0 - 8.0 %    Basophil % 0.3 0.0 - 1.9 %    Differential Method Automated    ISTAT PROCEDURE    Collection Time: 03/06/23  1:59 PM   Result Value Ref Range    POC Glucose 82 70 - 110 mg/dL    POC BUN 19 6 - 30 mg/dL    POC Creatinine 0.9 0.5 - 1.4 mg/dL    POC Sodium  138 136 - 145 mmol/L    POC Potassium 6.1 (H) 3.5 - 5.1 mmol/L    POC Chloride 106 95 - 110 mmol/L    POC TCO2 (MEASURED) 27 23 - 29 mmol/L    POC Ionized Calcium 1.26 1.06 - 1.42 mmol/L    POC Hematocrit 43 36 - 54 %PCV    Sample KANU    Lithium level    Collection Time: 03/06/23  2:37 PM   Result Value Ref Range    Lithium Level 0.7 0.6 - 1.2 mmol/L   Urinalysis, Reflex to Urine Culture Urine, Catheterized    Collection Time: 03/06/23  3:12 PM    Specimen: Urine   Result Value Ref Range    Specimen UA Urine, Catheterized     Color, UA Yellow Yellow, Straw, Corazon    Appearance, UA Clear Clear    pH, UA 7.0 5.0 - 8.0    Specific Gravity, UA 1.030 1.005 - 1.030    Protein, UA 1+ (A) Negative    Glucose, UA Negative Negative    Ketones, UA 3+ (A) Negative    Bilirubin (UA) Negative Negative    Occult Blood UA Negative Negative    Nitrite, UA Negative Negative    Leukocytes, UA Trace (A) Negative   Drug screen panel, emergency    Collection Time: 03/06/23  3:12 PM   Result Value Ref Range    Benzodiazepines Negative Negative    Methadone metabolites Negative Negative    Cocaine (Metab.) Negative Negative    Opiate Scrn, Ur Negative Negative    Barbiturate Screen, Ur Negative Negative    Amphetamine Screen, Ur Negative Negative    THC Presumptive Positive (A) Negative    Phencyclidine Negative Negative    Creatinine, Urine 335.0 (H) 15.0 - 325.0 mg/dL    Toxicology Information SEE COMMENT    Urinalysis Microscopic    Collection Time: 03/06/23  3:12 PM   Result Value Ref Range    RBC, UA 6 (H) 0 - 4 /hpf    WBC, UA 13 (H) 0 - 5 /hpf    Bacteria Occasional None-Occ /hpf    Squam Epithel, UA 3 /hpf    Hyaline Casts, UA 0 0-1/lpf /lpf    Microscopic Comment SEE COMMENT    POCT urine pregnancy    Collection Time: 03/06/23  3:21 PM   Result Value Ref Range    POC Preg Test, Ur Negative Negative     Acceptable Yes    Lithium level    Collection Time: 03/06/23  4:40 PM   Result Value Ref Range    Lithium Level 0.6  0.6 - 1.2 mmol/L   Comprehensive Metabolic Panel (CMP)    Collection Time: 03/07/23  5:04 AM   Result Value Ref Range    Sodium 142 136 - 145 mmol/L    Potassium 3.8 3.5 - 5.1 mmol/L    Chloride 114 (H) 95 - 110 mmol/L    CO2 20 (L) 23 - 29 mmol/L    Glucose 54 (L) 70 - 110 mg/dL    BUN 13 6 - 20 mg/dL    Creatinine 0.8 0.5 - 1.4 mg/dL    Calcium 8.7 8.7 - 10.5 mg/dL    Total Protein 5.5 (L) 6.0 - 8.4 g/dL    Albumin 3.3 (L) 3.5 - 5.2 g/dL    Total Bilirubin 0.9 0.1 - 1.0 mg/dL    Alkaline Phosphatase 74 55 - 135 U/L    AST 14 10 - 40 U/L    ALT 9 (L) 10 - 44 U/L    Anion Gap 8 8 - 16 mmol/L    eGFR >60.0 >60 mL/min/1.73 m^2   Magnesium    Collection Time: 03/07/23  5:04 AM   Result Value Ref Range    Magnesium 1.8 1.6 - 2.6 mg/dL   CBC with Automated Differential    Collection Time: 03/07/23  5:04 AM   Result Value Ref Range    WBC 8.42 3.90 - 12.70 K/uL    RBC 4.16 4.00 - 5.40 M/uL    Hemoglobin 12.4 12.0 - 16.0 g/dL    Hematocrit 38.8 37.0 - 48.5 %    MCV 93 82 - 98 fL    MCH 29.8 27.0 - 31.0 pg    MCHC 32.0 32.0 - 36.0 g/dL    RDW 11.9 11.5 - 14.5 %    Platelets 182 150 - 450 K/uL    MPV 9.9 9.2 - 12.9 fL    Immature Granulocytes 0.4 0.0 - 0.5 %    Gran # (ANC) 5.8 1.8 - 7.7 K/uL    Immature Grans (Abs) 0.03 0.00 - 0.04 K/uL    Lymph # 1.8 1.0 - 4.8 K/uL    Mono # 0.7 0.3 - 1.0 K/uL    Eos # 0.1 0.0 - 0.5 K/uL    Baso # 0.04 0.00 - 0.20 K/uL    nRBC 0 0 /100 WBC    Gran % 68.9 38.0 - 73.0 %    Lymph % 20.9 18.0 - 48.0 %    Mono % 8.0 4.0 - 15.0 %    Eosinophil % 1.3 0.0 - 8.0 %    Basophil % 0.5 0.0 - 1.9 %    Differential Method Automated    POCT glucose    Collection Time: 03/07/23  7:48 AM   Result Value Ref Range    POCT Glucose 53 (L) 70 - 110 mg/dL     Imaging Results              X-Ray Abdomen AP 1 View (KUB) (Final result)  Result time 03/06/23 15:44:11      Final result by Bienvenido Can MD (03/06/23 15:44:11)                   Impression:      1. Nonobstructive bowel gas  pattern.      Electronically signed by: Bienvenido Can MD  Date:    03/06/2023  Time:    15:44               Narrative:    EXAMINATION:  XR ABDOMEN AP 1 VIEW    CLINICAL HISTORY:  Toxic effect of unspecified substance, accidental (unintentional), initial encounter    TECHNIQUE:  AP View(s) of the abdomen was performed.    COMPARISON:  None    FINDINGS:  Single-view abdomen supine.    Air and stool is seen within the large bowel and projected over the rectum.  No focally dilated small bowel loops.  There are no calcifications to convincingly suggest nephrolithiasis or cholelithiasis.  There is levo scoliotic curvature of the spine.  No findings to suggest pneumatosis.                                       X-Ray Forearm Left (Final result)  Result time 03/06/23 15:44:59      Final result by Bienvenido Can MD (03/06/23 15:44:59)                   Impression:      As above      Electronically signed by: Bienvenido Can MD  Date:    03/06/2023  Time:    15:44               Narrative:    EXAMINATION:  XR FOREARM LEFT    CLINICAL HISTORY:  Unspecified open wound of unspecified upper arm, initial encounter    TECHNIQUE:  AP and lateral views of the left forearm were performed.    COMPARISON:  None    FINDINGS:  Two views left forearm.    No acute displaced fracture or dislocation of the forearm.  No radiopaque foreign body.  There is subcutaneous emphysema along the distal ulnar aspect of the forearm, correlation with any laceration in the region.

## 2023-03-07 NOTE — CONSULTS
"CONSULTATION LIAISON PSYCHIATRY INITIAL EVALUATION    Patient Name: Sailaja Carbajal  MRN: [id number]  Patient Class: IP- Inpatient  Admission Date: 3/6/2023  Attending Physician: Naga Chan MD      HPI:   Sailaja Carbajal is a 17 y.o. female with past psychiatric history of bipolar 1 disorder, anorexia nervosa, depression who presents to the ED/admitted to the hospital for Intentional drug overdose. Patient reportedly ingested 3 bottles of lithium 300 mg tablets and 2 bottles of Ativan 0.5 mg tablets in a suicide attempt. She was drowsy but arousable and hemodynamically stable in the ED. Initial labs largely unremarkable. Lithium level 0.8 > 0.7 > 0.6. EKG with sinus tachycardia. Urine drug screen positive for THC. She was admitted to hospital medicine for further monitoring.     Psychiatry consulted for "suicide attempt, intentional drug overdose, history of bipolar 1 disorder"    On psych exam, patient was found resting comfortably in bed. She was somewhat drowsy but ultimately alert and engaged in conversation. Her affect was subdued and constricted, at times tearful. Her speech was soft but otherwise appropriately responsive to questions. She affirmed that she had intentionally overdosed on 3 bottles of lithium and 2 bottles of Ativan (unclear how many tablets were in each bottle) in a suicide attempt. She endorsed ongoing suicidal ideation, saying "I wish [the overdose] had worked".   On subsequent interviews, pt denies plan or intent to harm herself while in the hospital. She also inflicted a laceration to her left wrist requiring sutures, and demonstrated significant scarring to her left shoulder from past self-injurious behavior.   Pt endorses depressed mood, hopelessness, diminished appetite, poor sleep, anhedonia, anxiety, and decreased concentration. She also reported intermitted paranoid thoughts, such as believing she has been followed, over the past several months that improved " "with lithium.   Has been restricting food intake, would eat one meal a week per pt. She denied recent overt manic symptomatology, though acknowledged a history of "manic and psychotic" symptoms (hallucinations, paranoia) while hospitalized in 2021 for anorexia (per chart review, was diagnosed with delirium due to multiple etiologies in addition to anorexia nervosa binge eating/purging type). In addition to lithium and Ativan, she has also been prescribed Abilify by Dr. William recently; she reports not taking any medication the past 1.5 months. Admits to single use of marijuana recently. She denied homicidal ideations and auditory/visual hallucinations.   She prefers to not return to [facility] but amenable to other inpatient psychiatric hospital, requests personal device to complete school work, discussed restrictions due to PEC at this time.      Collateral with patient's permission:   none    Medical Review of Systems:  Constitutional: drowsy  Derm: mild pain sensitivity to laceration site  Other medical ROS neg.     Psychiatric Review of Systems (is patient experiencing or having changes in):  sleep: yes  appetite: yes  weight: yes  energy/anergy: yes  interest/pleasure/anhedonia: yes  somatic symptoms: no  libido: no  anxiety/panic: yes  guilty/hopelessness: yes  concentration: yes  Oanh:no  Psychosis: intermittent paranoia  Trauma: no  S.I.B.s/risky behavior: yes    Past Psychiatric History:  Previous Medication Trials: yes, lithium, Abilify, Ativan, Risperdal, Zyprexa  Previous Psychiatric Hospitalizations:yes, most recently 2021 at [facility]    Previous Suicide Attempts: previous incidents of self-injurious behavior (cutting)  History of Violence: no  Outpatient Psychiatrist: yes, Candy William MD  Family Psychiatric History: yes, mother with alcohol use and borderline personality disorder. Maternal uncles and aunts with depression, anxiety, alcohol use disorder    Substance Abuse History (with emphasis over the " last 12 months):  Recreational Drugs:  denied, UDS +THC  Use of Alcohol: denied  Tobacco Use:no  Rehab History:no    Social History:  Marital Status: not   Children: 0  Employment Status/Info:  full-time student  :no  Education:  current sophomore at [employer], studying graphic design  Special Ed: no  Housing Status: in campus dormitory, no roommates  Access to gun: no  Psychosocial Stressors: health and anorexia  Functioning Relationships: good relationship with grandfather, aunt    Legal History:  Past Charges/Incarcerations: no  Pending charges:no    Mental Status Exam:  Arousal: alert althoug somewhat drowsy  Sensorium/Orientation: oriented to grossly intact  Behavior/Cooperation: normal, cooperative, psychomotor retardation, eye contact normal   Speech: slowed, soft  Language: grossly intact  Gait and Station: unable to assess - patient lying down or seated  Involuntary Movements and Motor Activity: no abnormal involuntary movements noted, no psychomotor agitation or retardation  Mood: depressed   Affect: depressed and constricted, tearful at times  Thought Process: normal and logical  Associations: intact, no loosening of associations  Thought Content: suicidal thoughts: (passive-yes), ruminations   Attention/Concentration:  Somewhat decreased  Memory:    Recent:  Intact   Remote: Intact  Fund of Knowledge: Aware of current events   Abstract reasoning: proverbs were abstract  Insight: has awareness of illness  Judgment: Fair: behavior is adequate to circumstances but limited by current mood    CAM ICU positive? no      ASSESSMENT & RECOMMENDATIONS   Intentional Overdose Lithium  Lithium toxicity  Bipolar 1 disorder, mre depressed  R/o Unipolar depressive disorder  Anorexia Nervosa, restricting type at risk for REFEEDING synsdrome  R/O Borderline personality disorder    BIPOLAR I disorder, mre depressed  PSYCH MEDICATIONS  Scheduled: Hold psych meds at this time.  PRN: none at this  "time.  Labs/other:  Recommend continued monitoring of renal function, EKG for cardiac rhythm.    Anorexia Nervosa, restricting type  Pt with recent restriction in food intake of eating 1 meal per week and drinking mostly coffee "to survive".    Recommend continued monitoring/repleting of Mg and Phos and monitoring for refeeding syndrome.  Recommend Nutrition consult for resuming diet.    RISK ASSESSMENT  NEEDS PEC because patient is in imminent danger of hurting self or others and is gravely disabled. & NEEDS 1:1 sitter    FOLLOW UP  Will follow up while in house    DISPOSITION - once medically cleared:   Seek involuntary inpatient psychiatric admission for stabilization of acute psychiatric symptoms and a safe disposition plan is enacted. The patient &/or their family was informed that the patient will be transferred to an inpatient unit per ED/primary placement team.     Please contact ON CALL psychiatry service (24/7) for any acute issues that may arise.      Case discussed with Dr. Danica Shaikh MD   Psychiatry  [facility]  3/7/2023 7:59 PM    --------------------------------------------------------------------------------------------------------------------------------------------------------------------------------------------------------------------------------------    STAFF NOTE  I have seen the patient, reviewed the Resident's history and physical, assessment, and plan. I have personally interviewed and clinically examined the patient and: agree with the findings. In addition to other pertinent findings per my assessment during rounds added to the above note.     DUONG CAO MD   Department of Psychiatry   [facility]  3/7/2023 1:24 PM     "       --------------------------------------------------------------------------------------------------------------------------------------------------------------------------------------------------------------------------------------    CONTINUED HISTORY & OBJECTIVE clinical data & findings reviewed and noted for above decision making    Past Medical/Surgical History:   Past Medical History:   Diagnosis Date    Anxiety     Depression      History reviewed. No pertinent surgical history.    Current Medications:   Scheduled Meds:       PRN Meds: acetaminophen, dextrose 10%, dextrose 10%, dextrose, dextrose, glucagon (human recombinant), naloxone, ondansetron, promethazine, sodium chloride 0.9%, DIPH,PERTUSS(ACELL),TET VACCINE (ADULT)(BOOSTRIX,ADACEL)  OTC Meds:     Allergies:   Review of patient's allergies indicates:  No Known Allergies    Vitals  Vitals:    03/07/23 1148   BP: 110/61   Pulse: 75   Resp:    Temp: 98.5 °F (36.9 °C)       Labs/Imaging/Studies:  Recent Results (from the past 24 hour(s))   HIV 1/2 Ag/Ab (4th Gen)    Collection Time: 03/06/23  1:05 PM   Result Value Ref Range    HIV 1/2 Ag/Ab Non-reactive Non-reactive   Hepatitis C Antibody    Collection Time: 03/06/23  1:05 PM   Result Value Ref Range    Hepatitis C Ab Non-reactive Non-reactive   Comprehensive metabolic panel    Collection Time: 03/06/23  1:05 PM   Result Value Ref Range    Sodium 139 136 - 145 mmol/L    Potassium 4.0 3.5 - 5.1 mmol/L    Chloride 106 95 - 110 mmol/L    CO2 20 (L) 23 - 29 mmol/L    Glucose 91 70 - 110 mg/dL    BUN 13 6 - 20 mg/dL    Creatinine 0.9 0.5 - 1.4 mg/dL    Calcium 10.0 8.7 - 10.5 mg/dL    Total Protein 7.5 6.0 - 8.4 g/dL    Albumin 4.5 3.5 - 5.2 g/dL    Total Bilirubin 0.7 0.1 - 1.0 mg/dL    Alkaline Phosphatase 92 55 - 135 U/L    AST 18 10 - 40 U/L    ALT 11 10 - 44 U/L    Anion Gap 13 8 - 16 mmol/L    eGFR >60.0 >60 mL/min/1.73 m^2   TSH    Collection Time: 03/06/23  1:05 PM   Result Value Ref Range    TSH  0.436 0.400 - 4.000 uIU/mL   Ethanol    Collection Time: 03/06/23  1:05 PM   Result Value Ref Range    Alcohol, Serum <10 <10 mg/dL   Acetaminophen level    Collection Time: 03/06/23  1:05 PM   Result Value Ref Range    Acetaminophen (Tylenol), Serum <3.0 (L) 10.0 - 20.0 ug/mL   Salicylate level    Collection Time: 03/06/23  1:05 PM   Result Value Ref Range    Salicylate Lvl <5.0 (L) 15.0 - 30.0 mg/dL   Lithium level    Collection Time: 03/06/23  1:05 PM   Result Value Ref Range    Lithium Level 0.8 0.6 - 1.2 mmol/L   Magnesium    Collection Time: 03/06/23  1:05 PM   Result Value Ref Range    Magnesium 2.1 1.6 - 2.6 mg/dL   CBC auto differential    Collection Time: 03/06/23  1:54 PM   Result Value Ref Range    WBC 7.69 3.90 - 12.70 K/uL    RBC 4.90 4.00 - 5.40 M/uL    Hemoglobin 14.4 12.0 - 16.0 g/dL    Hematocrit 44.7 37.0 - 48.5 %    MCV 91 82 - 98 fL    MCH 29.4 27.0 - 31.0 pg    MCHC 32.2 32.0 - 36.0 g/dL    RDW 12.3 11.5 - 14.5 %    Platelets 247 150 - 450 K/uL    MPV 10.9 9.2 - 12.9 fL    Immature Granulocytes 0.4 0.0 - 0.5 %    Gran # (ANC) 6.6 1.8 - 7.7 K/uL    Immature Grans (Abs) 0.03 0.00 - 0.04 K/uL    Lymph # 0.7 (L) 1.0 - 4.8 K/uL    Mono # 0.3 0.3 - 1.0 K/uL    Eos # 0.0 0.0 - 0.5 K/uL    Baso # 0.02 0.00 - 0.20 K/uL    nRBC 0 0 /100 WBC    Gran % 85.2 (H) 38.0 - 73.0 %    Lymph % 9.6 (L) 18.0 - 48.0 %    Mono % 4.4 4.0 - 15.0 %    Eosinophil % 0.1 0.0 - 8.0 %    Basophil % 0.3 0.0 - 1.9 %    Differential Method Automated    ISTAT PROCEDURE    Collection Time: 03/06/23  1:59 PM   Result Value Ref Range    POC Glucose 82 70 - 110 mg/dL    POC BUN 19 6 - 30 mg/dL    POC Creatinine 0.9 0.5 - 1.4 mg/dL    POC Sodium 138 136 - 145 mmol/L    POC Potassium 6.1 (H) 3.5 - 5.1 mmol/L    POC Chloride 106 95 - 110 mmol/L    POC TCO2 (MEASURED) 27 23 - 29 mmol/L    POC Ionized Calcium 1.26 1.06 - 1.42 mmol/L    POC Hematocrit 43 36 - 54 %PCV    Sample KANU    Lithium level    Collection Time: 03/06/23  2:37 PM    Result Value Ref Range    Lithium Level 0.7 0.6 - 1.2 mmol/L   Urinalysis, Reflex to Urine Culture Urine, Catheterized    Collection Time: 03/06/23  3:12 PM    Specimen: Urine   Result Value Ref Range    Specimen UA Urine, Catheterized     Color, UA Yellow Yellow, Straw, Corazon    Appearance, UA Clear Clear    pH, UA 7.0 5.0 - 8.0    Specific Gravity, UA 1.030 1.005 - 1.030    Protein, UA 1+ (A) Negative    Glucose, UA Negative Negative    Ketones, UA 3+ (A) Negative    Bilirubin (UA) Negative Negative    Occult Blood UA Negative Negative    Nitrite, UA Negative Negative    Leukocytes, UA Trace (A) Negative   Drug screen panel, emergency    Collection Time: 03/06/23  3:12 PM   Result Value Ref Range    Benzodiazepines Negative Negative    Methadone metabolites Negative Negative    Cocaine (Metab.) Negative Negative    Opiate Scrn, Ur Negative Negative    Barbiturate Screen, Ur Negative Negative    Amphetamine Screen, Ur Negative Negative    THC Presumptive Positive (A) Negative    Phencyclidine Negative Negative    Creatinine, Urine 335.0 (H) 15.0 - 325.0 mg/dL    Toxicology Information SEE COMMENT    Urinalysis Microscopic    Collection Time: 03/06/23  3:12 PM   Result Value Ref Range    RBC, UA 6 (H) 0 - 4 /hpf    WBC, UA 13 (H) 0 - 5 /hpf    Bacteria Occasional None-Occ /hpf    Squam Epithel, UA 3 /hpf    Hyaline Casts, UA 0 0-1/lpf /lpf    Microscopic Comment SEE COMMENT    POCT urine pregnancy    Collection Time: 03/06/23  3:21 PM   Result Value Ref Range    POC Preg Test, Ur Negative Negative     Acceptable Yes    Lithium level    Collection Time: 03/06/23  4:40 PM   Result Value Ref Range    Lithium Level 0.6 0.6 - 1.2 mmol/L   Comprehensive Metabolic Panel (CMP)    Collection Time: 03/07/23  5:04 AM   Result Value Ref Range    Sodium 142 136 - 145 mmol/L    Potassium 3.8 3.5 - 5.1 mmol/L    Chloride 114 (H) 95 - 110 mmol/L    CO2 20 (L) 23 - 29 mmol/L    Glucose 54 (L) 70 - 110 mg/dL    BUN  13 6 - 20 mg/dL    Creatinine 0.8 0.5 - 1.4 mg/dL    Calcium 8.7 8.7 - 10.5 mg/dL    Total Protein 5.5 (L) 6.0 - 8.4 g/dL    Albumin 3.3 (L) 3.5 - 5.2 g/dL    Total Bilirubin 0.9 0.1 - 1.0 mg/dL    Alkaline Phosphatase 74 55 - 135 U/L    AST 14 10 - 40 U/L    ALT 9 (L) 10 - 44 U/L    Anion Gap 8 8 - 16 mmol/L    eGFR >60.0 >60 mL/min/1.73 m^2   Magnesium    Collection Time: 03/07/23  5:04 AM   Result Value Ref Range    Magnesium 1.8 1.6 - 2.6 mg/dL   CBC with Automated Differential    Collection Time: 03/07/23  5:04 AM   Result Value Ref Range    WBC 8.42 3.90 - 12.70 K/uL    RBC 4.16 4.00 - 5.40 M/uL    Hemoglobin 12.4 12.0 - 16.0 g/dL    Hematocrit 38.8 37.0 - 48.5 %    MCV 93 82 - 98 fL    MCH 29.8 27.0 - 31.0 pg    MCHC 32.0 32.0 - 36.0 g/dL    RDW 11.9 11.5 - 14.5 %    Platelets 182 150 - 450 K/uL    MPV 9.9 9.2 - 12.9 fL    Immature Granulocytes 0.4 0.0 - 0.5 %    Gran # (ANC) 5.8 1.8 - 7.7 K/uL    Immature Grans (Abs) 0.03 0.00 - 0.04 K/uL    Lymph # 1.8 1.0 - 4.8 K/uL    Mono # 0.7 0.3 - 1.0 K/uL    Eos # 0.1 0.0 - 0.5 K/uL    Baso # 0.04 0.00 - 0.20 K/uL    nRBC 0 0 /100 WBC    Gran % 68.9 38.0 - 73.0 %    Lymph % 20.9 18.0 - 48.0 %    Mono % 8.0 4.0 - 15.0 %    Eosinophil % 1.3 0.0 - 8.0 %    Basophil % 0.5 0.0 - 1.9 %    Differential Method Automated    POCT glucose    Collection Time: 03/07/23  7:48 AM   Result Value Ref Range    POCT Glucose 53 (L) 70 - 110 mg/dL   POCT glucose    Collection Time: 03/07/23  9:20 AM   Result Value Ref Range    POCT Glucose 95 70 - 110 mg/dL     Imaging Results              X-Ray Abdomen AP 1 View (KUB) (Final result)  Result time 03/06/23 15:44:11      Final result by Bienvenido Can MD (03/06/23 15:44:11)                   Impression:      1. Nonobstructive bowel gas pattern.      Electronically signed by: Bienvenido Can MD  Date:    03/06/2023  Time:    15:44               Narrative:    EXAMINATION:  XR ABDOMEN AP 1 VIEW    CLINICAL HISTORY:  Toxic effect of  unspecified substance, accidental (unintentional), initial encounter    TECHNIQUE:  AP View(s) of the abdomen was performed.    COMPARISON:  None    FINDINGS:  Single-view abdomen supine.    Air and stool is seen within the large bowel and projected over the rectum.  No focally dilated small bowel loops.  There are no calcifications to convincingly suggest nephrolithiasis or cholelithiasis.  There is levo scoliotic curvature of the spine.  No findings to suggest pneumatosis.                                       X-Ray Forearm Left (Final result)  Result time 03/06/23 15:44:59      Final result by Bienvenido Can MD (03/06/23 15:44:59)                   Impression:      As above      Electronically signed by: Bienvenido Can MD  Date:    03/06/2023  Time:    15:44               Narrative:    EXAMINATION:  XR FOREARM LEFT    CLINICAL HISTORY:  Unspecified open wound of unspecified upper arm, initial encounter    TECHNIQUE:  AP and lateral views of the left forearm were performed.    COMPARISON:  None    FINDINGS:  Two views left forearm.    No acute displaced fracture or dislocation of the forearm.  No radiopaque foreign body.  There is subcutaneous emphysema along the distal ulnar aspect of the forearm, correlation with any laceration in the region.

## 2023-03-07 NOTE — PLAN OF CARE
"Received pt for care at 0700, pt in bed resting, sitter at bedside. MAR, labs, and chart reviewed. Pt A&Ox4, VSS and afebrile. Pt able to maintain O2 sats >92% on room air. Pt hypoglycemic during AM, pt treated with PRN D10 with good effect, see results. Pt has poor PO intake during shift, pt states she is "kind of on hunger strike". Pt has IVF infusing per orders. Pt had psych consult completed, see note. Pt showered during shift. Pt able to turn and reposition self in bed. Pt remained free of falls, acute injury, or skin breakdown. Pt bed in lowest position, wheels locked, and call bell within reach.      Problem: Violence Risk or Actual  Goal: Anger and Impulse Control  Outcome: Ongoing, Progressing     Problem: Adult Inpatient Plan of Care  Goal: Plan of Care Review  Outcome: Ongoing, Progressing  Goal: Patient-Specific Goal (Individualized)  Outcome: Ongoing, Progressing  Goal: Absence of Hospital-Acquired Illness or Injury  Outcome: Ongoing, Progressing  Goal: Optimal Comfort and Wellbeing  Outcome: Ongoing, Progressing  Goal: Readiness for Transition of Care  Outcome: Ongoing, Progressing     "

## 2023-03-07 NOTE — H&P
[facility] - Telemetry [facility] Medicine  History & Physical    Patient Name: Sailaja Carbajal  MRN: [id number]  Patient Class: IP- Inpatient  Admission Date: 3/6/2023  Attending Physician: Naga Chan MD   Primary Care Provider: Primary Doctor No         Patient information was obtained from patient and ER records.     Subjective:     Principal Problem:Intentional drug overdose    Chief Complaint:   Chief Complaint   Patient presents with    Drug Overdose     Pt via EMS from [facility]. EMS reports pt ingested 3x bottles of Lithium + 2x bottles of Ativan. 97% on RA. Known psychiatric hx.        HPI: 17 y.o. female with history of bipolar 1, anorexia, depression, presented with complaints of drug overdose. Patient reported that she is prescribed lithium, Ativan, aripiprazole for bipolar 1 and anorexia.  She is only been taking her aripiprazole for the past few weeks to months.  Patient states at about 11:00 a.m. on 03/06/2023 she took 3 bottles of 300 mg lithium tablets and 2 bottles of 10 mg Ativan tablets.  Patient has been depressed recently and admits this was a suicide attempt.  She does not have a history of prior suicide attempts but has significant history of self-harm including self cutting.  She have used a  to cut her forearms. On my interview, patient is tearful and says that she does not want to discuss anything at this moment but is agreeable to talk more tomorrow. She reports feeling depressed.      On arrival to ED, patient was found to be hemodynamically stable.  She was drowsy but easily arousable.  Patient has been maintaining her airway and is on room air.  Initial labs with no leukocytosis.  Creatinine 0.9.  Bicarb 20.  Glucose 91.  TSH 0.436.  Salicylate and Tylenol levels negative.  Lithium level 0.8, 0.7 10.6.  Urine pregnancy test negative.  Urine drug test positive for marijuana metabolite  Xray abd with Nonobstructive bowel gas pattern. EKG  with sinus tachycardia.  ED physician discussed with  who recommended step-down admission.  No indication for emergent dialysis at this time.  Patient was PEC'd.  She had a self-inflicted laceration to left wrist which was repaired in ED. Patient was admitted under care of Hospital Medicine for further evaluation      Past Medical History:   Diagnosis Date    Anxiety     Depression        History reviewed. No pertinent surgical history.    Review of patient's allergies indicates:  No Known Allergies    No current facility-administered medications on file prior to encounter.     Current Outpatient Medications on File Prior to Encounter   Medication Sig    lithium (LITHOBID) 300 MG CR tablet Take 900 mg by mouth nightly.    LORazepam (ATIVAN) 0.5 MG tablet Take 0.5 mg by mouth 2 (two) times daily.    [DISCONTINUED] lithium (LITHOBID) 300 MG CR tablet Take 300 tablets by mouth once daily.    ARIPiprazole (ABILIFY) 15 MG Tab Take 15 mg by mouth.    medroxyPROGESTERone (PROVERA) 10 MG tablet Take 1 tablet (10 mg total) by mouth once daily.    propranoloL (INDERAL) 40 MG tablet Take 40 mg by mouth 2 (two) times daily.    [DISCONTINUED] ARIPiprazole (ABILIFY) 10 MG Tab Take 10 mg by mouth.     Family History       Problem Relation (Age of Onset)    No Known Problems Father, Mother          Tobacco Use    Smoking status: Never     Passive exposure: Never    Smokeless tobacco: Never   Substance and Sexual Activity    Alcohol use: Never    Drug use: Never    Sexual activity: Never     Review of Systems   Constitutional:  Positive for activity change. Negative for chills, diaphoresis and fever.   HENT:  Negative for congestion and drooling.    Eyes:  Negative for discharge and itching.   Respiratory:  Negative for apnea and chest tightness.    Gastrointestinal:  Negative for abdominal distention and abdominal pain.   Endocrine: Negative for cold intolerance and heat intolerance.   Genitourinary:  Negative for  difficulty urinating and dysuria.   Musculoskeletal:  Negative for arthralgias and back pain.   Skin:  Negative for color change and pallor.   Neurological:  Negative for dizziness and facial asymmetry.   Psychiatric/Behavioral:  Positive for confusion, self-injury and suicidal ideas.    Objective:     Vital Signs (Most Recent):  Temp: 98 °F (36.7 °C) (03/06/23 1800)  Pulse: 83 (03/06/23 1930)  Resp: (!) 22 (03/06/23 1930)  BP: (!) 104/58 (03/06/23 1930)  SpO2: 100 % (03/06/23 1930)   Vital Signs (24h Range):  Temp:  [98 °F (36.7 °C)-98.2 °F (36.8 °C)] 98 °F (36.7 °C)  Pulse:  [] 83  Resp:  [16-22] 22  SpO2:  [95 %-100 %] 100 %  BP: ()/(50-89) 104/58     Weight: 68.9 kg (152 lb)  Body mass index is 24.53 kg/m².    Physical Exam  Constitutional:       Appearance: She is normal weight. She is ill-appearing.      Comments: Confused and drowsy   HENT:      Head: Normocephalic and atraumatic.      Nose: Nose normal.      Mouth/Throat:      Mouth: Mucous membranes are dry.      Pharynx: Oropharynx is clear.   Cardiovascular:      Rate and Rhythm: Regular rhythm. Tachycardia present.      Pulses: Normal pulses.      Heart sounds: Normal heart sounds.   Pulmonary:      Effort: Pulmonary effort is normal.      Breath sounds: Normal breath sounds.   Abdominal:      General: Abdomen is flat.      Palpations: Abdomen is soft.   Musculoskeletal:      Cervical back: Normal range of motion and neck supple.   Skin:     General: Skin is warm.   Neurological:      General: No focal deficit present.      Mental Status: She is disoriented.   Psychiatric:      Comments: depressed           Significant Labs: All pertinent labs within the past 24 hours have been reviewed.  BMP:   Recent Labs   Lab 03/06/23  1305   GLU 91      K 4.0      CO2 20*   BUN 13   CREATININE 0.9   CALCIUM 10.0   MG 2.1     CBC:   Recent Labs   Lab 03/06/23  1354 03/06/23  1359   WBC 7.69  --    HGB 14.4  --    HCT 44.7 43     --       CMP:   Recent Labs   Lab 03/06/23  1305      K 4.0      CO2 20*   GLU 91   BUN 13   CREATININE 0.9   CALCIUM 10.0   PROT 7.5   ALBUMIN 4.5   BILITOT 0.7   ALKPHOS 92   AST 18   ALT 11   ANIONGAP 13     Cardiac Markers: No results for input(s): CKMB, MYOGLOBIN, BNP, TROPISTAT in the last 48 hours.    Significant Imaging: I have reviewed all pertinent imaging results/findings within the past 24 hours.        Assessment/Plan:     * Intentional drug overdose  Benzodiazepine and lithium overdose  7 y.o. female with history of bipolar 1, anorexia, depression, presented with complaints of drug overdose. Patient reported that she is prescribed lithium, Ativan, aripiprazole for bipolar 1 and anorexia.  At about 11:00 a.m. on 03/06/2023 she took 3 bottles of 300 mg lithium tablets and 2 bottles of 10 mg Ativan tablets.  Patient has been depressed recently and admits this was a suicide attempt.  She does not have a history of prior suicide attempts but has significant history of self-harm including self cutting.  She have used a  to cut her forearms.      hemodynamically stable at the moment. Maintaining airway. Drowsy but easily arousable.     Creatinine 0.9.  Bicarb 20.  Glucose 91.  TSH 0.436.  Salicylate and Tylenol levels negative.    Lithium level 0.8, 0.7 10.6.    Urine drug test positive for marijuana metabolite   Xray abd with Nonobstructive bowel gas pattern.   EKG with sinus tachycardia.    ED physician discussed with  who recommended step-down admission.  No indication for emergent dialysis at this time.  Patient was PEC'd in ED.    Admitted to stepdown for close monitoring. Continue to monitor vitals  Aggressive IVF hydration  Continue supportive management  Psych consult         Bipolar 1 disorder  Holding all home psych meds pending psych eval       Suicide attempt  Hx of bipolar disorder type 1. Reports being depressed. Present with intentional drug OD, No hx of prior  suicide attempt but hx of self inflicting lacerations      PEC'ed in ED. Psych consulted      VTE Risk Mitigation (From admission, onward)           Ordered     IP VTE LOW RISK PATIENT  Once         03/06/23 1926     Place sequential compression device  Until discontinued         03/06/23 1926                       Naga Chan MD  Department of Hospital Medicine   [facility] - Telemetry Stepdown

## 2023-03-07 NOTE — HOSPITAL COURSE
17 y.o. female with history of bipolar 1, anorexia, depression, presented with complaints of drug overdose. She took 3 bottles of 300 mg lithium tablets and 2 bottles of 10 mg Ativan tablets.  Patient has been depressed recently and admits this was a suicide attempt. She does not have a history of prior suicide attempts but has significant history of self-harm including self cutting.  She have used a  to cut her forearms.  recommended supportive management with step-down admission. Psych consulted. PEC'ed. Per RN , CEC placed in chart 3/7.     Pt's labs remained stable. Her lithium level on admission was not toxic. Pt is medically cleared. She will be discharge to inpatient psych unit until family find a eating disorder program.       Below are the medical problems that were addressed during this hospitalization;    * Intentional drug overdose  Benzodiazepine and lithium overdose   hemodynamically stable. Maintaining airway.    TSH 0.436.  Salicylate and Tylenol levels negative.    Lithium level 0.8, 0.7, 0.6.    Urine drug test positive for marijuana metabolite   Xray abd with Nonobstructive bowel gas pattern.   EKG with sinus tachycardia.    ED physician discussed with  who recommended step-down admission.  No indication for emergent dialysis at this time.  Patient was PEC'd in ED.     Admitted to stepdown for close monitoring. Vitals remained stable, labs are stable.   Received Aggressive IVF hydration. Encouraged PO hydration   Psych consult. Need inpt psych admits. Pt is medically stable.          Suicide attempt  Hx of bipolar disorder type 1. Reports being depressed. Present with intentional drug OD, No hx of prior suicide attempt but hx of self inflicting lacerations    PEC'ed in ED. Psych consulted. Pt will need inpt psych admission. Pt is medically cleared.      Forearm laceration, left, initial encounter  Pt used  to injure herself.   4 cm laceration start at  the left wrist and extends up. This required 4 sutures performed by the ER on 3/6.   Sutures to be removed 7-10 days post placement.      Anorexia  Electrolytes are stable.   She will be discharge to inpatient psych unit until family find a eating disorder program. Aunt who is a professor at [employer] is working on it.      Bipolar 1 disorder  Holding all home psych meds per psych recommendation.  Inpt psych to decide on what meds to start pts on.     Pt is medically stable for discharge to inpatient psych. Plan was for discharge yesterday, but aunt requested a psych unit that would allow visitors. Psych have contacted [facility] in psych who will have a bed for the pt today. Aunt and pt were notified and they are in agreement.       Physical exam;  Vitals; reviewed  General; no acute distress  HENT; NC/AT.  CV; RRR  Resp; CTA  Skin; 4 sutures placed left wrist, area is healing well with no surrounding erythema

## 2023-03-07 NOTE — HPI
17 y.o. female with history of bipolar 1, anorexia, depression, presented with complaints of drug overdose. Patient reported that she is prescribed lithium, Ativan, aripiprazole for bipolar 1 and anorexia.  She is only been taking her aripiprazole for the past few weeks to months.  Patient states at about 11:00 a.m. on 03/06/2023 she took 3 bottles of 300 mg lithium tablets and 2 bottles of 10 mg Ativan tablets.  Patient has been depressed recently and admits this was a suicide attempt.  She does not have a history of prior suicide attempts but has significant history of self-harm including self cutting.  She have used a  to cut her forearms. On my interview, patient is tearful and says that she does not want to discuss anything at this moment but is agreeable to talk more tomorrow. She reports feeling depressed.      On arrival to ED, patient was found to be hemodynamically stable.  She was drowsy but easily arousable.  Patient has been maintaining her airway and is on room air.  Initial labs with no leukocytosis.  Creatinine 0.9.  Bicarb 20.  Glucose 91.  TSH 0.436.  Salicylate and Tylenol levels negative.  Lithium level 0.8, 0.7 10.6.  Urine pregnancy test negative.  Urine drug test positive for marijuana metabolite  Xray abd with Nonobstructive bowel gas pattern. EKG with sinus tachycardia.  ED physician discussed with  who recommended step-down admission.  No indication for emergent dialysis at this time.  Patient was PEC'd.  She had a self-inflicted laceration to left wrist which was repaired in ED. Patient was admitted under care of Hospital Medicine for further evaluation

## 2023-03-07 NOTE — PROGRESS NOTES
[facility] - Telemetry [facility] Medicine  Progress Note    Patient Name: Sailaja Carbajal  MRN: [id number]  Patient Class: IP- Inpatient   Admission Date: 3/6/2023  Length of Stay: 0 days  Attending Physician: Naga Chan MD  Primary Care Provider: Primary Doctor No        Subjective:     Principal Problem:<principal problem not specified>        HPI:  17 y.o. female with history of bipolar 1, anorexia, depression, presented with complaints of drug overdose. Patient reported that she is prescribed lithium, Ativan, aripiprazole for bipolar 1 and anorexia.  She is only been taking her aripiprazole for the past few weeks to months.  Patient states at about 11:00 a.m. on 03/06/2023 she took 3 bottles of 300 mg lithium tablets and 2 bottles of 10 mg Ativan tablets.  Patient has been depressed recently and admits this was a suicide attempt.  She does not have a history of prior suicide attempts but has significant history of self-harm including self cutting.  She have used a  to cut her forearms. On my interview, patient is tearful and says that she does not want to discuss anything at this moment but is agreeable to talk more tomorrow. She reports feeling depressed.      On arrival to ED, patient was found to be hemodynamically stable.  She was drowsy but easily arousable.  Patient has been maintaining her airway and is on room air.  Initial labs with no leukocytosis.  Creatinine 0.9.  Bicarb 20.  Glucose 91.  TSH 0.436.  Salicylate and Tylenol levels negative.  Lithium level 0.8, 0.7 10.6.  Urine pregnancy test negative.  Urine drug test positive for marijuana metabolite  Xray abd with Nonobstructive bowel gas pattern. EKG with sinus tachycardia.  ED physician discussed with  who recommended step-down admission.  No indication for emergent dialysis at this time.  Patient was PEC'd.  She had a self-inflicted laceration to left wrist which was repaired in ED. Patient was admitted  under care of Hospital Medicine for further evaluation        Overview/Hospital Course:  No notes on file    Past Medical History:   Diagnosis Date    Anxiety     Depression        History reviewed. No pertinent surgical history.    Review of patient's allergies indicates:  No Known Allergies    No current facility-administered medications on file prior to encounter.     Current Outpatient Medications on File Prior to Encounter   Medication Sig    lithium (LITHOBID) 300 MG CR tablet Take 900 mg by mouth nightly.    LORazepam (ATIVAN) 0.5 MG tablet Take 0.5 mg by mouth 2 (two) times daily.    [DISCONTINUED] lithium (LITHOBID) 300 MG CR tablet Take 300 tablets by mouth once daily.    ARIPiprazole (ABILIFY) 15 MG Tab Take 15 mg by mouth.    medroxyPROGESTERone (PROVERA) 10 MG tablet Take 1 tablet (10 mg total) by mouth once daily.    propranoloL (INDERAL) 40 MG tablet Take 40 mg by mouth 2 (two) times daily.    [DISCONTINUED] ARIPiprazole (ABILIFY) 10 MG Tab Take 10 mg by mouth.     Family History       Problem Relation (Age of Onset)    No Known Problems Father, Mother          Tobacco Use    Smoking status: Never     Passive exposure: Never    Smokeless tobacco: Never   Substance and Sexual Activity    Alcohol use: Never    Drug use: Never    Sexual activity: Never     Review of Systems   Constitutional:  Positive for activity change. Negative for chills, diaphoresis and fever.   HENT:  Negative for congestion and drooling.    Eyes:  Negative for discharge and itching.   Respiratory:  Negative for apnea and chest tightness.    Gastrointestinal:  Negative for abdominal distention and abdominal pain.   Endocrine: Negative for cold intolerance and heat intolerance.   Genitourinary:  Negative for difficulty urinating and dysuria.   Musculoskeletal:  Negative for arthralgias and back pain.   Skin:  Negative for color change and pallor.   Neurological:  Negative for dizziness and facial asymmetry.    Psychiatric/Behavioral:  Positive for confusion, self-injury and suicidal ideas.    Objective:     Vital Signs (Most Recent):  Temp: 98 °F (36.7 °C) (03/06/23 1800)  Pulse: 83 (03/06/23 1930)  Resp: (!) 22 (03/06/23 1930)  BP: (!) 104/58 (03/06/23 1930)  SpO2: 100 % (03/06/23 1930)   Vital Signs (24h Range):  Temp:  [98 °F (36.7 °C)-98.2 °F (36.8 °C)] 98 °F (36.7 °C)  Pulse:  [] 83  Resp:  [16-22] 22  SpO2:  [95 %-100 %] 100 %  BP: ()/(50-89) 104/58     Weight: 68.9 kg (152 lb)  Body mass index is 24.53 kg/m².    Physical Exam  Constitutional:       Appearance: She is normal weight. She is ill-appearing.      Comments: Confused and drowsy   HENT:      Head: Normocephalic and atraumatic.      Nose: Nose normal.      Mouth/Throat:      Mouth: Mucous membranes are dry.      Pharynx: Oropharynx is clear.   Cardiovascular:      Rate and Rhythm: Regular rhythm. Tachycardia present.      Pulses: Normal pulses.      Heart sounds: Normal heart sounds.   Pulmonary:      Effort: Pulmonary effort is normal.      Breath sounds: Normal breath sounds.   Abdominal:      General: Abdomen is flat.      Palpations: Abdomen is soft.   Musculoskeletal:      Cervical back: Normal range of motion and neck supple.   Skin:     General: Skin is warm.   Neurological:      General: No focal deficit present.      Mental Status: She is disoriented.   Psychiatric:      Comments: depressed           Significant Labs: All pertinent labs within the past 24 hours have been reviewed.  BMP:   Recent Labs   Lab 03/06/23  1305   GLU 91      K 4.0      CO2 20*   BUN 13   CREATININE 0.9   CALCIUM 10.0   MG 2.1     CBC:   Recent Labs   Lab 03/06/23  1354 03/06/23  1359   WBC 7.69  --    HGB 14.4  --    HCT 44.7 43     --      CMP:   Recent Labs   Lab 03/06/23  1305      K 4.0      CO2 20*   GLU 91   BUN 13   CREATININE 0.9   CALCIUM 10.0   PROT 7.5   ALBUMIN 4.5   BILITOT 0.7   ALKPHOS 92   AST 18   ALT 11    ANIONGAP 13     Cardiac Markers: No results for input(s): CKMB, MYOGLOBIN, BNP, TROPISTAT in the last 48 hours.    Significant Imaging: I have reviewed all pertinent imaging results/findings within the past 24 hours.            Assessment/Plan:      Bipolar 1 disorder  Holding all home psych meds pending psych eval       Suicide attempt  Hx of bipolar disorder type 1. Reports being depressed. Present with intentional drug OD, No hx of prior suicide attempt but hx of self inflicting lacerations      PEC'ed in ED. Psych consulted    Intentional drug overdose  Benzodiazepine and lithium overdose  7 y.o. female with history of bipolar 1, anorexia, depression, presented with complaints of drug overdose. Patient reported that she is prescribed lithium, Ativan, aripiprazole for bipolar 1 and anorexia.  At about 11:00 a.m. on 03/06/2023 she took 3 bottles of 300 mg lithium tablets and 2 bottles of 10 mg Ativan tablets.  Patient has been depressed recently and admits this was a suicide attempt.  She does not have a history of prior suicide attempts but has significant history of self-harm including self cutting.  She have used a  to cut her forearms.      hemodynamically stable at the moment. Maintaining airway. Drowsy but easily arousable.     Creatinine 0.9.  Bicarb 20.  Glucose 91.  TSH 0.436.  Salicylate and Tylenol levels negative.    Lithium level 0.8, 0.7 10.6.    Urine drug test positive for marijuana metabolite   Xray abd with Nonobstructive bowel gas pattern.   EKG with sinus tachycardia.    ED physician discussed with  who recommended step-down admission.  No indication for emergent dialysis at this time.  Patient was PEC'd in ED.    Admitted to stepdown for close monitoring. Continue to monitor vitals  Aggressive IVF hydration  Continue supportive management  Psych consult           VTE Risk Mitigation (From admission, onward)         Ordered     IP VTE LOW RISK PATIENT  Once          03/06/23 1926     Place sequential compression device  Until discontinued         03/06/23 1926                Discharge Planning   KACI:      Code Status: Full Code   Is the patient medically ready for discharge?:     Reason for patient still in hospital (select all that apply): Patient trending condition                     Seemal MD Dustin  Department of Hospital Medicine   [facility] - Telemetry Stepdown

## 2023-03-07 NOTE — ED NOTES
Received bedside report from CARLO Flaherty. Assumed care of pt at this time  Pt AAOx4 resting calmly in bed with eyes closed. Chest rise and fall noted. Respirations E/UL. NAD noted. Pt remains attached to VS monitoring at this time d/t medical necessity.   ED Socorro becker, at bedside under direct observation doing q15min assessments

## 2023-03-07 NOTE — PLAN OF CARE
[facility] - Telemetry Stepdown  Initial Discharge Assessment       Primary Care Provider: Primary Doctor No    Admission Diagnosis: Open wound of arm [S41.109A]  Overdose [T50.901A]  Chest pain [R07.9]  Ingestion of toxic substance [T65.91XA]    Admission Date: 3/6/2023  Expected Discharge Date:     Discharge Barriers Identified: None    Payor: [employer] BLUE [employer]IELD / Plan: BCBS OF [facility] / Product Type: HMO /     Extended Emergency Contact Information  Primary Emergency Contact: dileepzaina  Mobile Phone: [phone]  Relation: Relative  Preferred language: English   needed? No  Secondary Emergency Contact: dileepmoise  Mobile Phone: [phone]  Relation: Grandparent  Preferred language: English   needed? No    Discharge Plan A: Home         [id number] - [place], [place] - [address] AT [facility] [address] [facility]  [address] [address]  Phone: [phone] Fax: [phone]    [id number] - [place], [place] - [address]  [address] [facility] [address]  Phone: [phone] Fax: [phone]      Initial Assessment (most recent)       Adult Discharge Assessment - 03/07/23 1520          Discharge Assessment    Assessment Type Discharge Planning Assessment     Confirmed/corrected address, phone number and insurance Yes     Confirmed Demographics Correct on Facesheet     Source of Information patient     Communicated KACI with patient/caregiver No     People in Home grandparent(s)     Do you expect to return to your current living situation? Yes     Do you have help at home or someone to help you manage your care at home? No     Prior to hospitilization cognitive status: Alert/Oriented     Current cognitive status: Alert/Oriented     Equipment Currently Used at Home none     Readmission within 30 days? No     Patient currently being followed by  outpatient case management? No     Do you currently have service(s) that help you manage your care at home? No     Do you take prescription medications? Yes     Do you have prescription coverage? Yes     Do you have any problems affording any of your prescribed medications? TBD     Who is going to help you get home at discharge? grandmother     How do you get to doctors appointments? car, drives self;family or friend will provide     Are you on dialysis? No     Do you take coumadin? No     Discharge Plan A Home     DME Needed Upon Discharge  none     Discharge Plan discussed with: Patient   grandmother    Discharge Barriers Identified None        OTHER    Name(s) of People in Home grandmother-Aliyah Solomon [phone]                   Naima Salgado LCSW  PRN

## 2023-03-07 NOTE — PHARMACY MED REC
"Admission Medication History     The home medication history was taken by Jhon Fontenot.    You may go to "Admission" then "Reconcile Home Medications" tabs to review and/or act upon these items.     The home medication list has been updated by the Pharmacy department.   Please read ALL comments highlighted in yellow.   Please address this information as you see fit.    Feel free to contact us if you have any questions or require assistance.          Medications listed below were obtained from: Patient/family  PTA Medications   Medication Sig    ARIPiprazole (ABILIFY) 15 MG Tab   Take 15 mg by mouth once daily.    lithium (LITHOBID) 300 MG CR tablet   Take 900 mg by mouth nightly.    LORazepam (ATIVAN) 0.5 MG tablet   Take 0.5 mg by mouth 2 (two) times daily.    medroxyPROGESTERone (PROVERA) 10 MG tablet   Take 1 tablet (10 mg total) by mouth once daily.    propranoloL (INDERAL) 40 MG tablet   Take 40 mg by mouth 2 (two) times daily.       Potential issues to be addressed PRIOR TO DISCHARGE  Please discuss with the patient barriers to adherence with medication treatment plans  Patient requires education regarding drug therapies     Jhon Fontenot  EXT [id number]                  .        "

## 2023-03-07 NOTE — PROGRESS NOTES
[facility] - Telemetry [facility] Medicine  Progress Note    Patient Name: Sailaja Carbajal  MRN: [id number]  Patient Class: IP- Inpatient   Admission Date: 3/6/2023  Length of Stay: 1 days  Attending Physician: Naga Chan MD  Primary Care Provider: Primary Doctor No        Subjective:     Principal Problem:Intentional drug overdose        HPI:  17 y.o. female with history of bipolar 1, anorexia, depression, presented with complaints of drug overdose. Patient reported that she is prescribed lithium, Ativan, aripiprazole for bipolar 1 and anorexia.  She is only been taking her aripiprazole for the past few weeks to months.  Patient states at about 11:00 a.m. on 03/06/2023 she took 3 bottles of 300 mg lithium tablets and 2 bottles of 10 mg Ativan tablets.  Patient has been depressed recently and admits this was a suicide attempt.  She does not have a history of prior suicide attempts but has significant history of self-harm including self cutting.  She have used a  to cut her forearms. On my interview, patient is tearful and says that she does not want to discuss anything at this moment but is agreeable to talk more tomorrow. She reports feeling depressed.      On arrival to ED, patient was found to be hemodynamically stable.  She was drowsy but easily arousable.  Patient has been maintaining her airway and is on room air.  Initial labs with no leukocytosis.  Creatinine 0.9.  Bicarb 20.  Glucose 91.  TSH 0.436.  Salicylate and Tylenol levels negative.  Lithium level 0.8, 0.7 10.6.  Urine pregnancy test negative.  Urine drug test positive for marijuana metabolite  Xray abd with Nonobstructive bowel gas pattern. EKG with sinus tachycardia.  ED physician discussed with  who recommended step-down admission.  No indication for emergent dialysis at this time.  Patient was PEC'd.  She had a self-inflicted laceration to left wrist which was repaired in ED. Patient was admitted under  care of Hospital Medicine for further evaluation        Overview/Hospital Course:  No notes on file    More awake and alert today. No new complaints. Psych following. Per RN , CEC placed in chart. EKG daily for rhythm check. Consulted RD    Review of Systems   Constitutional:  Positive for activity change. Negative for chills, diaphoresis and fever.   HENT:  Negative for congestion and drooling.    Eyes:  Negative for discharge and itching.   Respiratory:  Negative for apnea and chest tightness.    Gastrointestinal:  Negative for abdominal distention and abdominal pain.   Endocrine: Negative for cold intolerance and heat intolerance.   Genitourinary:  Negative for difficulty urinating and dysuria.   Musculoskeletal:  Negative for arthralgias and back pain.   Skin:  Negative for color change and pallor.   Neurological:  Negative for dizziness and facial asymmetry.   Psychiatric/Behavioral:  Positive for confusion, self-injury and suicidal ideas.    Objective:     Vital Signs (Most Recent):  Temp: 98 °F (36.7 °C) (03/06/23 1800)  Pulse: 83 (03/06/23 1930)  Resp: (!) 22 (03/06/23 1930)  BP: (!) 104/58 (03/06/23 1930)  SpO2: 100 % (03/06/23 1930)   Vital Signs (24h Range):  Temp:  [98 °F (36.7 °C)-98.2 °F (36.8 °C)] 98 °F (36.7 °C)  Pulse:  [] 83  Resp:  [16-22] 22  SpO2:  [95 %-100 %] 100 %  BP: ()/(50-89) 104/58     Weight: 68.9 kg (152 lb)  Body mass index is 24.53 kg/m².    Physical Exam  Constitutional:       Appearance: She is normal weight. She is ill-appearing.      Comments: Confused and drowsy   HENT:      Head: Normocephalic and atraumatic.      Nose: Nose normal.      Mouth/Throat:      Mouth: Mucous membranes are dry.      Pharynx: Oropharynx is clear.   Cardiovascular:      Rate and Rhythm: Regular rhythm. Tachycardia present.      Pulses: Normal pulses.      Heart sounds: Normal heart sounds.   Pulmonary:      Effort: Pulmonary effort is normal.      Breath sounds: Normal breath sounds.    Abdominal:      General: Abdomen is flat.      Palpations: Abdomen is soft.   Musculoskeletal:      Cervical back: Normal range of motion and neck supple.   Skin:     General: Skin is warm.   Neurological:      General: No focal deficit present.      Mental Status: She is disoriented.   Psychiatric:      Comments: depressed           Significant Labs: All pertinent labs within the past 24 hours have been reviewed.  BMP:   Recent Labs   Lab 03/06/23  1305   GLU 91      K 4.0      CO2 20*   BUN 13   CREATININE 0.9   CALCIUM 10.0   MG 2.1     CBC:   Recent Labs   Lab 03/06/23  1354 03/06/23  1359   WBC 7.69  --    HGB 14.4  --    HCT 44.7 43     --      CMP:   Recent Labs   Lab 03/06/23  1305      K 4.0      CO2 20*   GLU 91   BUN 13   CREATININE 0.9   CALCIUM 10.0   PROT 7.5   ALBUMIN 4.5   BILITOT 0.7   ALKPHOS 92   AST 18   ALT 11   ANIONGAP 13             Assessment/Plan:      * Intentional drug overdose  Benzodiazepine and lithium overdose  7 y.o. female with history of bipolar 1, anorexia, depression, presented with complaints of drug overdose. Patient reported that she is prescribed lithium, Ativan, aripiprazole for bipolar 1 and anorexia.  At about 11:00 a.m. on 03/06/2023 she took 3 bottles of 300 mg lithium tablets and 2 bottles of 10 mg Ativan tablets.  Patient has been depressed recently and admits this was a suicide attempt.  She does not have a history of prior suicide attempts but has significant history of self-harm including self cutting.  She have used a  to cut her forearms.      hemodynamically stable at the moment. Maintaining airway. Drowsy but easily arousable.     Creatinine 0.9.  Bicarb 20.  Glucose 91.  TSH 0.436.  Salicylate and Tylenol levels negative.    Lithium level 0.8, 0.7 10.6.    Urine drug test positive for marijuana metabolite   Xray abd with Nonobstructive bowel gas pattern.   EKG with sinus tachycardia.    ED physician discussed with   who recommended step-down admission.  No indication for emergent dialysis at this time.  Patient was PEC'd in ED.    Admitted to stepdown for close monitoring. Continue to monitor vitals  Aggressive IVF hydration  Continue supportive management  Psych consult         Anorexia  Psych following.   Check electrolytes daily and replete prn  RD consulted      Bipolar 1 disorder  Holding all home psych meds pending psych eval       Suicide attempt  Hx of bipolar disorder type 1. Reports being depressed. Present with intentional drug OD, No hx of prior suicide attempt but hx of self inflicting lacerations      PEC'ed in ED. Psych consulted      VTE Risk Mitigation (From admission, onward)         Ordered     IP VTE LOW RISK PATIENT  Once         03/06/23 1926     Place sequential compression device  Until discontinued         03/06/23 1926                Discharge Planning   KACI:      Code Status: Full Code   Is the patient medically ready for discharge?:     Reason for patient still in hospital (select all that apply): Patient trending condition                     Naga Chan MD  Department of Hospital Medicine   [facility] - Telemetry Stepdown

## 2023-03-07 NOTE — ASSESSMENT & PLAN NOTE
Hx of bipolar disorder type 1. Reports being depressed. Present with intentional drug OD, No hx of prior suicide attempt but hx of self inflicting lacerations      PEC'ed in ED. Psych consulted

## 2023-03-07 NOTE — PLAN OF CARE
Problem: Violence Risk or Actual  Goal: Anger and Impulse Control  Outcome: Ongoing, Progressing     Problem: Adult Inpatient Plan of Care  Goal: Plan of Care Review  Outcome: Ongoing, Progressing  Goal: Patient-Specific Goal (Individualized)  Outcome: Ongoing, Progressing  Goal: Absence of Hospital-Acquired Illness or Injury  Outcome: Ongoing, Progressing  Goal: Optimal Comfort and Wellbeing  Outcome: Ongoing, Progressing  Goal: Readiness for Transition of Care  Outcome: Ongoing, Progressing   PT alert and oriented x 4. Able to voice all wants and needs. All needs met.  She has remained free of falls and injuries. PT has remained with a 1:1 sitter r/t SI and self harm. Safety eduction and plan of care reviewed with PT and he voiced understanding. Bed is low and locked with call light with in easy reach.

## 2023-03-08 LAB
ALBUMIN SERPL BCP-MCNC: 3.3 G/DL (ref 3.5–5.2)
ALP SERPL-CCNC: 76 U/L (ref 55–135)
ALT SERPL W/O P-5'-P-CCNC: 8 U/L (ref 10–44)
AMITRIP SERPL-MCNC: <10 NG/ML
AMITRIP+NOR SERPL-MCNC: ABNORMAL NG/ML (ref 95–250)
ANION GAP SERPL CALC-SCNC: 8 MMOL/L (ref 8–16)
AST SERPL-CCNC: 13 U/L (ref 10–40)
BASOPHILS # BLD AUTO: 0.03 K/UL (ref 0–0.2)
BASOPHILS NFR BLD: 0.5 % (ref 0–1.9)
BILIRUB SERPL-MCNC: 0.6 MG/DL (ref 0.1–1)
BUN SERPL-MCNC: 6 MG/DL (ref 6–20)
CALCIUM SERPL-MCNC: 8.6 MG/DL (ref 8.7–10.5)
CHLORIDE SERPL-SCNC: 112 MMOL/L (ref 95–110)
CLOMIPRAMINE SERPL-MCNC: <20 NG/ML
CLOMIPRAMINE+NOR SERPL-MCNC: ABNORMAL NG/ML (ref 220–500)
CO2 SERPL-SCNC: 20 MMOL/L (ref 23–29)
CREAT SERPL-MCNC: 0.7 MG/DL (ref 0.5–1.4)
DESIPRAMINE SERPL-MCNC: <10 NG/ML (ref 100–300)
DIFFERENTIAL METHOD: NORMAL
DOXEPIN SERPL-MCNC: <10 NG/ML
DOXEPIN+NOR SERPL-MCNC: ABNORMAL NG/ML (ref 100–300)
EOSINOPHIL # BLD AUTO: 0.2 K/UL (ref 0–0.5)
EOSINOPHIL NFR BLD: 2.7 % (ref 0–8)
ERYTHROCYTE [DISTWIDTH] IN BLOOD BY AUTOMATED COUNT: 11.8 % (ref 11.5–14.5)
EST. GFR  (NO RACE VARIABLE): >60 ML/MIN/1.73 M^2
GLUCOSE SERPL-MCNC: 68 MG/DL (ref 70–110)
HCT VFR BLD AUTO: 37.2 % (ref 37–48.5)
HGB BLD-MCNC: 12.3 G/DL (ref 12–16)
IMIPRAMINE SERPL-MCNC: <10 NG/ML
IMIPRAMINE+DESIPR SERPL-MCNC: ABNORMAL NG/ML (ref 150–300)
IMM GRANULOCYTES # BLD AUTO: 0.01 K/UL (ref 0–0.04)
IMM GRANULOCYTES NFR BLD AUTO: 0.2 % (ref 0–0.5)
LYMPHOCYTES # BLD AUTO: 1.9 K/UL (ref 1–4.8)
LYMPHOCYTES NFR BLD: 31.6 % (ref 18–48)
MAGNESIUM SERPL-MCNC: 1.6 MG/DL (ref 1.6–2.6)
MCH RBC QN AUTO: 30 PG (ref 27–31)
MCHC RBC AUTO-ENTMCNC: 33.1 G/DL (ref 32–36)
MCV RBC AUTO: 91 FL (ref 82–98)
MONOCYTES # BLD AUTO: 0.5 K/UL (ref 0.3–1)
MONOCYTES NFR BLD: 8.4 % (ref 4–15)
NEUTROPHILS # BLD AUTO: 3.3 K/UL (ref 1.8–7.7)
NEUTROPHILS NFR BLD: 56.6 % (ref 38–73)
NORCLOMIPRAMINE SERPL-MCNC: <20 NG/ML
NORDOXEPIN SERPL-MCNC: <10 NG/ML
NORTRIP SERPL-MCNC: <10 NG/ML (ref 50–150)
NRBC BLD-RTO: 0 /100 WBC
PHOSPHATE SERPL-MCNC: 3.2 MG/DL (ref 2.7–4.5)
PLATELET # BLD AUTO: 186 K/UL (ref 150–450)
PMV BLD AUTO: 10.1 FL (ref 9.2–12.9)
POTASSIUM SERPL-SCNC: 3.9 MMOL/L (ref 3.5–5.1)
PROT SERPL-MCNC: 5.4 G/DL (ref 6–8.4)
PROTRIP SERPL-MCNC: <10 NG/ML (ref 70–240)
RBC # BLD AUTO: 4.1 M/UL (ref 4–5.4)
SODIUM SERPL-SCNC: 140 MMOL/L (ref 136–145)
WBC # BLD AUTO: 5.85 K/UL (ref 3.9–12.7)

## 2023-03-08 PROCEDURE — 99232 PR SUBSEQUENT HOSPITAL CARE,LEVL II: ICD-10-PCS | Mod: ,,, | Performed by: STUDENT IN AN ORGANIZED HEALTH CARE EDUCATION/TRAINING PROGRAM

## 2023-03-08 PROCEDURE — 99232 SBSQ HOSP IP/OBS MODERATE 35: CPT | Mod: ,,, | Performed by: PSYCHIATRY & NEUROLOGY

## 2023-03-08 PROCEDURE — 36415 COLL VENOUS BLD VENIPUNCTURE: CPT | Performed by: STUDENT IN AN ORGANIZED HEALTH CARE EDUCATION/TRAINING PROGRAM

## 2023-03-08 PROCEDURE — 93010 EKG 12-LEAD: ICD-10-PCS | Mod: ,,, | Performed by: INTERNAL MEDICINE

## 2023-03-08 PROCEDURE — 20600001 HC STEP DOWN PRIVATE ROOM

## 2023-03-08 PROCEDURE — 83735 ASSAY OF MAGNESIUM: CPT | Performed by: STUDENT IN AN ORGANIZED HEALTH CARE EDUCATION/TRAINING PROGRAM

## 2023-03-08 PROCEDURE — 84100 ASSAY OF PHOSPHORUS: CPT | Performed by: STUDENT IN AN ORGANIZED HEALTH CARE EDUCATION/TRAINING PROGRAM

## 2023-03-08 PROCEDURE — 93005 ELECTROCARDIOGRAM TRACING: CPT

## 2023-03-08 PROCEDURE — 99232 PR SUBSEQUENT HOSPITAL CARE,LEVL II: ICD-10-PCS | Mod: ,,, | Performed by: PSYCHIATRY & NEUROLOGY

## 2023-03-08 PROCEDURE — 99232 SBSQ HOSP IP/OBS MODERATE 35: CPT | Mod: ,,, | Performed by: STUDENT IN AN ORGANIZED HEALTH CARE EDUCATION/TRAINING PROGRAM

## 2023-03-08 PROCEDURE — 93010 ELECTROCARDIOGRAM REPORT: CPT | Mod: ,,, | Performed by: INTERNAL MEDICINE

## 2023-03-08 PROCEDURE — 80053 COMPREHEN METABOLIC PANEL: CPT | Performed by: STUDENT IN AN ORGANIZED HEALTH CARE EDUCATION/TRAINING PROGRAM

## 2023-03-08 PROCEDURE — 85025 COMPLETE CBC W/AUTO DIFF WBC: CPT | Performed by: STUDENT IN AN ORGANIZED HEALTH CARE EDUCATION/TRAINING PROGRAM

## 2023-03-08 NOTE — SUBJECTIVE & OBJECTIVE
Interval History: Patient reports feeling better overall. Psych following     Review of Systems  Objective:     Vital Signs (Most Recent):  Temp: 97.7 °F (36.5 °C) (03/08/23 0838)  Pulse: 92 (03/08/23 0838)  Resp: 18 (03/08/23 0838)  BP: 120/71 (03/08/23 0838)  SpO2: 99 % (03/08/23 0838) Vital Signs (24h Range):  Temp:  [96.7 °F (35.9 °C)-98.8 °F (37.1 °C)] 97.7 °F (36.5 °C)  Pulse:  [] 92  Resp:  [13-18] 18  SpO2:  [96 %-100 %] 99 %  BP: (110-124)/(61-71) 120/71     Weight: 68 kg (149 lb 14.6 oz)  Body mass index is 24.2 kg/m².  No intake or output data in the 24 hours ending 03/08/23 1104   Physical Exam  Constitutional:       Appearance: She is normal weight.      Comments: Confused and drowsy   HENT:      Head: Normocephalic and atraumatic.      Nose: Nose normal.   Cardiovascular:      Rate and Rhythm: Normal rate and regular rhythm.      Pulses: Normal pulses.      Heart sounds: Normal heart sounds.   Pulmonary:      Effort: Pulmonary effort is normal.      Breath sounds: Normal breath sounds.   Abdominal:      General: Abdomen is flat.      Palpations: Abdomen is soft.   Musculoskeletal:      Cervical back: Normal range of motion and neck supple.   Skin:     General: Skin is warm.   Neurological:      General: No focal deficit present.      Mental Status: She is alert and oriented to person, place, and time.

## 2023-03-08 NOTE — PLAN OF CARE
Problem: Violence Risk or Actual  Goal: Anger and Impulse Control  Outcome: Ongoing, Progressing     Problem: Adult Inpatient Plan of Care  Goal: Plan of Care Review  Outcome: Ongoing, Progressing  Goal: Patient-Specific Goal (Individualized)  Outcome: Ongoing, Progressing  Goal: Absence of Hospital-Acquired Illness or Injury  Outcome: Ongoing, Progressing  Goal: Optimal Comfort and Wellbeing  Outcome: Ongoing, Progressing  Goal: Readiness for Transition of Care  Outcome: Ongoing, Progressing

## 2023-03-08 NOTE — ASSESSMENT & PLAN NOTE
Benzodiazepine and lithium overdose  7 y.o. female with history of bipolar 1, anorexia, depression, presented with complaints of drug overdose. Patient reported that she is prescribed lithium, Ativan, aripiprazole for bipolar 1 and anorexia.  At about 11:00 a.m. on 03/06/2023 she took 3 bottles of 300 mg lithium tablets and 2 bottles of 10 mg Ativan tablets.  Patient has been depressed recently and admits this was a suicide attempt.  She does not have a history of prior suicide attempts but has significant history of self-harm including self cutting.  She have used a  to cut her forearms.      hemodynamically stable at the moment. Maintaining airway. Drowsy but easily arousable.     Creatinine 0.9.  Bicarb 20.  Glucose 91.  TSH 0.436.  Salicylate and Tylenol levels negative.    Lithium level 0.8, 0.7 10.6.    Urine drug test positive for marijuana metabolite   Xray abd with Nonobstructive bowel gas pattern.   EKG with sinus tachycardia.    ED physician discussed with  who recommended step-down admission.  No indication for emergent dialysis at this time.  Patient was PEC'd in ED.    Admitted to stepdown for close monitoring. Continue to monitor vitals  Received Aggressive IVF hydration. Encourage PO hydration   Continue supportive management  Psych consult

## 2023-03-08 NOTE — PLAN OF CARE
Recommendations     1. Continue current Regular diet; encourage PO intake as tolerated.   2. If pt agreeable, add Boost Plus ONS to aid in caloric intake.   3. RD to monitor & follow-up.     Goals: Meet % EEN, EPN by RD f/u date  Nutrition Goal Status: new  Communication of RD Recs: reviewed with RN

## 2023-03-08 NOTE — CONSULTS
"  [facility] - Telemetry Stepdown  Adult Nutrition  Consult Note    SUMMARY     Recommendations    1. Continue current Regular diet; encourage PO intake as tolerated.   2. If pt agreeable, add Boost Plus ONS to aid in caloric intake.   3. RD to monitor & follow-up.    Goals: Meet % EEN, EPN by RD f/u date  Nutrition Goal Status: new  Communication of RD Recs: reviewed with RN    Assessment and Plan    Nutrition Problem:  Inadequate energy intake    Related to (etiology):   Behavioral issues     Signs and Symptoms (as evidenced by):   PO intake 0-50%    Interventions(treatment strategy):  Collaboration of nutrition care w/ other providers  ONS    Nutrition Diagnosis Status:   New    Reason for Assessment    Reason For Assessment: consult  Diagnosis: other (see comments) (Drug OD)  Relevant Medical History: Bipolar, Depression, Anorexia  Interdisciplinary Rounds: did not attend    General Information Comments: Information obtained from RN documentation & chart review - RD doesn't feel it is appropriate to speak w/ patient about calories, weight, etc. 2/2 pmh of anorexia & pt admitted w/ intentional drug overdose. Noted pt stated to RN last night she is on a "hunger strike". Per RN documentation this AM, pt consumed 50% of breakfast. Psychiatry consult pending. UBW: 150# x 2 years, per chart review. RD following.   Nutrition Discharge Planning: Adequate PO intake    Nutrition/Diet History    Factors Affecting Nutritional Intake: behavioral (mealtime)    Anthropometrics    Temp: 98.1 °F (36.7 °C)  Height Method: Stated  Height: 5' 6" (167.6 cm)  Height (inches): 66 in  Weight Method: Bed Scale  Weight: 68 kg (149 lb 14.6 oz)  Weight (lb): 149.91 lb  Ideal Body Weight (IBW), Female: 130 lb  % Ideal Body Weight, Female (lb): 115.32 %  BMI (Calculated): 24.2  BMI Grade: 18.5-24.9 - normal    Lab/Procedures/Meds    Pertinent Labs Reviewed: reviewed  Pertinent Medications Reviewed: reviewed    Estimated/Assessed " Needs    Weight Used For Calorie Calculations: 68 kg (149 lb 14.6 oz)    Energy Calorie Requirements (kcal): 1766 kcal/d  Energy Need Method: Gaston-St Jeor (1.2 PAL)    Protein Requirements: 68-75 g/d (1-1.1 g/kg)  Weight Used For Protein Calculations: 68 kg (149 lb 14.6 oz)    Estimated Fluid Requirement Method: other (see comments) (Per MD or 1 mL/kcal)  RDA Method (mL): 1766    Nutrition Prescription Ordered    Current Diet Order: Regular    Evaluation of Received Nutrient/Fluid Intake    I/O: +2L since admit    Comments: LBM: 3/6    Tolerance: tolerating    Nutrition Risk    Level of Risk/Frequency of Follow-up:  (1x/week)     Monitor and Evaluation    Food and Nutrient Intake: energy intake, food and beverage intake  Food and Nutrient Adminstration: diet order  Physical Activity and Function: nutrition-related ADLs and IADLs  Anthropometric Measurements: weight, weight change  Biochemical Data, Medical Tests and Procedures: inflammatory profile, lipid profile, glucose/endocrine profile, gastrointestinal profile, electrolyte and renal panel  Nutrition-Focused Physical Findings: overall appearance     Nutrition Follow-Up    RD Follow-up?: Yes

## 2023-03-08 NOTE — PROGRESS NOTES
"CONSULTATION LIAISON PSYCHIATRY PROGRESS NOTE    Patient Name: Sailaja Carbajal  MRN: [id number]  Patient Class: IP- Inpatient  Admission Date: 3/6/2023  Attending Physician: Naga Chan MD      SUBJECTIVE:   Sailaja Carbajal is a 17 y.o. female with past psychiatric history of bipolar 1 disorder, anorexia nervosa, depression who presents to the ED/admitted to the hospital for Intentional drug overdose. Patient reportedly ingested 3 bottles of lithium 300 mg tablets and 2 bottles of Ativan 0.5 mg tablets in a suicide attempt. She was drowsy but arousable and hemodynamically stable in the ED. Initial labs largely unremarkable. Lithium level 0.8 > 0.7 > 0.6. EKG with sinus tachycardia. Urine drug screen positive for THC. She was admitted to hospital medicine for further monitoring.      Psychiatry consulted for "suicide attempt, intentional drug overdose, history of bipolar 1 disorder"       Today,   Pt reports slept well, had a small breakfast this morning.  Still having some dizziness when standing although reports drowsiness has improved.   Had family visit yesterday and mood has improved since then.  Denies SI but has had thoughts of self harm occur although has not acted on them since admission.    Discussed dispo, potentially going to residential program specific for eating disorders and mood, pt reluctant on initial response stating she does not want to miss more school, is already a semester behind, and that she fears that the eating disorders will get competitive between peers at this residential program and that she won't do well there.  Pt amenable to give further thought and does not need to decide today.   Denies SI, HI, AVH,  does endorse anxiety regarding future dispo plans.      OBJECTIVE:    Mental Status Exam:  Appearance: unremarkable, age appropriate  Behavior/Cooperation: normal, cooperative, psychomotor retardation, eye contact normal  Speech: normal tone, normal pitch, " "slowed, soft  Mood: "better"  Affect: dysphoric and tearful at times.  Thought Process: normal and logical  Thought Content: normal, no suicidality, no homicidality, delusions, or paranoia , + thoughts of self harm  Orientation: grossly intact  Memory: Grossly intact  Attention Span/Concentration: Normal  Insight: fair  Judgment: fair    CAM ICU positive? no      ASSESSMENT & RECOMMENDATIONS   Intentional Overdose Lithium  Lithium toxicity  Bipolar 1 disorder, mre depressed  R/o Unipolar depressive disorder  Anorexia Nervosa, restricting type at risk for REFEEDING syndrome  R/O Borderline personality disorder     BIPOLAR I disorder, mre depressed  PSYCH MEDICATIONS  Scheduled: Hold psych meds at this time.  PRN: none at this time.  Labs/other:  Recommend continued monitoring of renal function, EKG for cardiac rhythm.     Anorexia Nervosa, restricting type  Pt with recent restriction in food intake of eating 1 meal per week and drinking mostly coffee "to survive".    Recommend continued monitoring/repleting of Mg and Phos and continued monitoring for refeeding syndrome as pt resumes diet.      RISK ASSESSMENT  NEEDS PEC because patient is in imminent danger of hurting self or others and is gravely disabled. & NEEDS 1:1 sitter     FOLLOW UP  Will follow up while in house     DISPOSITION - once medically cleared:   Seek involuntary inpatient psychiatric admission for stabilization of acute psychiatric symptoms and a safe disposition plan is enacted. The patient &/or their family was informed that the patient will be transferred to an inpatient unit per ED/primary placement team.      Please contact ON CALL psychiatry service (24/7) for any acute issues that may arise.        Case discussed with Dr. Danica Shaikh MD   Psychiatry  [facility]  3/8/2023 10:33 " AM        --------------------------------------------------------------------------------------------------------------------------------------------------------------------------------------------------------------------------------------    CONTINUED OBJECTIVE clinical data & findings reviewed and noted for above decision making    Current Medications:   Scheduled Meds:   PRN Meds: acetaminophen, dextrose 10%, dextrose 10%, dextrose, dextrose, glucagon (human recombinant), naloxone, ondansetron, promethazine, sodium chloride 0.9%, DIPH,PERTUSS(ACELL),TET VACCINE (ADULT)(BOOSTRIX,ADACEL)    Allergies:   Review of patient's allergies indicates:  No Known Allergies    Vitals  Vitals:    03/08/23 0838   BP: 120/71   Pulse: 92   Resp: 18   Temp: 97.7 °F (36.5 °C)       Labs/Imaging/Studies:  Recent Results (from the past 24 hour(s))   Comprehensive Metabolic Panel (CMP)    Collection Time: 03/08/23  5:46 AM   Result Value Ref Range    Sodium 140 136 - 145 mmol/L    Potassium 3.9 3.5 - 5.1 mmol/L    Chloride 112 (H) 95 - 110 mmol/L    CO2 20 (L) 23 - 29 mmol/L    Glucose 68 (L) 70 - 110 mg/dL    BUN 6 6 - 20 mg/dL    Creatinine 0.7 0.5 - 1.4 mg/dL    Calcium 8.6 (L) 8.7 - 10.5 mg/dL    Total Protein 5.4 (L) 6.0 - 8.4 g/dL    Albumin 3.3 (L) 3.5 - 5.2 g/dL    Total Bilirubin 0.6 0.1 - 1.0 mg/dL    Alkaline Phosphatase 76 55 - 135 U/L    AST 13 10 - 40 U/L    ALT 8 (L) 10 - 44 U/L    Anion Gap 8 8 - 16 mmol/L    eGFR >60.0 >60 mL/min/1.73 m^2   CBC with Automated Differential    Collection Time: 03/08/23  5:46 AM   Result Value Ref Range    WBC 5.85 3.90 - 12.70 K/uL    RBC 4.10 4.00 - 5.40 M/uL    Hemoglobin 12.3 12.0 - 16.0 g/dL    Hematocrit 37.2 37.0 - 48.5 %    MCV 91 82 - 98 fL    MCH 30.0 27.0 - 31.0 pg    MCHC 33.1 32.0 - 36.0 g/dL    RDW 11.8 11.5 - 14.5 %    Platelets 186 150 - 450 K/uL    MPV 10.1 9.2 - 12.9 fL    Immature Granulocytes 0.2 0.0 - 0.5 %    Gran # (ANC) 3.3 1.8 - 7.7 K/uL    Immature Grans (Abs)  0.01 0.00 - 0.04 K/uL    Lymph # 1.9 1.0 - 4.8 K/uL    Mono # 0.5 0.3 - 1.0 K/uL    Eos # 0.2 0.0 - 0.5 K/uL    Baso # 0.03 0.00 - 0.20 K/uL    nRBC 0 0 /100 WBC    Gran % 56.6 38.0 - 73.0 %    Lymph % 31.6 18.0 - 48.0 %    Mono % 8.4 4.0 - 15.0 %    Eosinophil % 2.7 0.0 - 8.0 %    Basophil % 0.5 0.0 - 1.9 %    Differential Method Automated    Phosphorus    Collection Time: 03/08/23  5:46 AM   Result Value Ref Range    Phosphorus 3.2 2.7 - 4.5 mg/dL   Magnesium    Collection Time: 03/08/23  5:46 AM   Result Value Ref Range    Magnesium 1.6 1.6 - 2.6 mg/dL     Imaging Results              X-Ray Abdomen AP 1 View (KUB) (Final result)  Result time 03/06/23 15:44:11      Final result by Bienvenido Can MD (03/06/23 15:44:11)                   Impression:      1. Nonobstructive bowel gas pattern.      Electronically signed by: Bienvenido Can MD  Date:    03/06/2023  Time:    15:44               Narrative:    EXAMINATION:  XR ABDOMEN AP 1 VIEW    CLINICAL HISTORY:  Toxic effect of unspecified substance, accidental (unintentional), initial encounter    TECHNIQUE:  AP View(s) of the abdomen was performed.    COMPARISON:  None    FINDINGS:  Single-view abdomen supine.    Air and stool is seen within the large bowel and projected over the rectum.  No focally dilated small bowel loops.  There are no calcifications to convincingly suggest nephrolithiasis or cholelithiasis.  There is levo scoliotic curvature of the spine.  No findings to suggest pneumatosis.                                       X-Ray Forearm Left (Final result)  Result time 03/06/23 15:44:59      Final result by Bienvenido Can MD (03/06/23 15:44:59)                   Impression:      As above      Electronically signed by: Bienvenido Can MD  Date:    03/06/2023  Time:    15:44               Narrative:    EXAMINATION:  XR FOREARM LEFT    CLINICAL HISTORY:  Unspecified open wound of unspecified upper arm, initial encounter    TECHNIQUE:  AP and lateral  views of the left forearm were performed.    COMPARISON:  None    FINDINGS:  Two views left forearm.    No acute displaced fracture or dislocation of the forearm.  No radiopaque foreign body.  There is subcutaneous emphysema along the distal ulnar aspect of the forearm, correlation with any laceration in the region.

## 2023-03-08 NOTE — PROGRESS NOTES
[facility] - Tele[facility] Medicine  Progress Note    Patient Name: Sailaja Carbajal  MRN: [id number]  Patient Class: IP- Inpatient   Admission Date: 3/6/2023  Length of Stay: 2 days  Attending Physician: Naga Chan MD  Primary Care Provider: Primary Doctor No        Subjective:     Principal Problem:Intentional drug overdose        HPI:  17 y.o. female with history of bipolar 1, anorexia, depression, presented with complaints of drug overdose. Patient reported that she is prescribed lithium, Ativan, aripiprazole for bipolar 1 and anorexia.  She is only been taking her aripiprazole for the past few weeks to months.  Patient states at about 11:00 a.m. on 03/06/2023 she took 3 bottles of 300 mg lithium tablets and 2 bottles of 10 mg Ativan tablets.  Patient has been depressed recently and admits this was a suicide attempt.  She does not have a history of prior suicide attempts but has significant history of self-harm including self cutting.  She have used a  to cut her forearms. On my interview, patient is tearful and says that she does not want to discuss anything at this moment but is agreeable to talk more tomorrow. She reports feeling depressed.      On arrival to ED, patient was found to be hemodynamically stable.  She was drowsy but easily arousable.  Patient has been maintaining her airway and is on room air.  Initial labs with no leukocytosis.  Creatinine 0.9.  Bicarb 20.  Glucose 91.  TSH 0.436.  Salicylate and Tylenol levels negative.  Lithium level 0.8, 0.7 10.6.  Urine pregnancy test negative.  Urine drug test positive for marijuana metabolite  Xray abd with Nonobstructive bowel gas pattern. EKG with sinus tachycardia.  ED physician discussed with  who recommended step-down admission.  No indication for emergent dialysis at this time.  Patient was PEC'd.  She had a self-inflicted laceration to left wrist which was repaired in ED. Patient was admitted under  care of Hospital Medicine for further evaluation        Overview/Hospital Course:  17 y.o. female with history of bipolar 1, anorexia, depression, presented with complaints of drug overdose. She took 3 bottles of 300 mg lithium tablets and 2 bottles of 10 mg Ativan tablets.  Patient has been depressed recently and admits this was a suicide attempt.  recommended supportive management with step-down admission. Psych consulted. PEC'ed. Per RN THAIS placed in chart 3/7.     - More alert and awake.   - FU psych recs      Interval History: Patient reports feeling better overall. Psych following     Review of Systems  Objective:     Vital Signs (Most Recent):  Temp: 97.7 °F (36.5 °C) (03/08/23 0838)  Pulse: 92 (03/08/23 0838)  Resp: 18 (03/08/23 0838)  BP: 120/71 (03/08/23 0838)  SpO2: 99 % (03/08/23 0838) Vital Signs (24h Range):  Temp:  [96.7 °F (35.9 °C)-98.8 °F (37.1 °C)] 97.7 °F (36.5 °C)  Pulse:  [] 92  Resp:  [13-18] 18  SpO2:  [96 %-100 %] 99 %  BP: (110-124)/(61-71) 120/71     Weight: 68 kg (149 lb 14.6 oz)  Body mass index is 24.2 kg/m².  No intake or output data in the 24 hours ending 03/08/23 1104   Physical Exam  Constitutional:       Appearance: She is normal weight.      Comments: Confused and drowsy   HENT:      Head: Normocephalic and atraumatic.      Nose: Nose normal.   Cardiovascular:      Rate and Rhythm: Normal rate and regular rhythm.      Pulses: Normal pulses.      Heart sounds: Normal heart sounds.   Pulmonary:      Effort: Pulmonary effort is normal.      Breath sounds: Normal breath sounds.   Abdominal:      General: Abdomen is flat.      Palpations: Abdomen is soft.   Musculoskeletal:      Cervical back: Normal range of motion and neck supple.   Skin:     General: Skin is warm.   Neurological:      General: No focal deficit present.      Mental Status: She is alert and oriented to person, place, and time.           Assessment/Plan:      * Intentional drug  overdose  Benzodiazepine and lithium overdose  7 y.o. female with history of bipolar 1, anorexia, depression, presented with complaints of drug overdose. Patient reported that she is prescribed lithium, Ativan, aripiprazole for bipolar 1 and anorexia.  At about 11:00 a.m. on 03/06/2023 she took 3 bottles of 300 mg lithium tablets and 2 bottles of 10 mg Ativan tablets.  Patient has been depressed recently and admits this was a suicide attempt.  She does not have a history of prior suicide attempts but has significant history of self-harm including self cutting.  She have used a  to cut her forearms.      hemodynamically stable at the moment. Maintaining airway. Drowsy but easily arousable.     Creatinine 0.9.  Bicarb 20.  Glucose 91.  TSH 0.436.  Salicylate and Tylenol levels negative.    Lithium level 0.8, 0.7 10.6.    Urine drug test positive for marijuana metabolite   Xray abd with Nonobstructive bowel gas pattern.   EKG with sinus tachycardia.    ED physician discussed with  who recommended step-down admission.  No indication for emergent dialysis at this time.  Patient was PEC'd in ED.    Admitted to stepdown for close monitoring. Continue to monitor vitals  Received Aggressive IVF hydration. Encourage PO hydration   Continue supportive management  Psych consult         Anorexia  Psych following.   Check electrolytes daily and replete prn  RD consulted      Bipolar 1 disorder  Holding all home psych meds pending psych eval       Suicide attempt  Hx of bipolar disorder type 1. Reports being depressed. Present with intentional drug OD, No hx of prior suicide attempt but hx of self inflicting lacerations      PEC'ed in ED. Psych consulted      VTE Risk Mitigation (From admission, onward)         Ordered     IP VTE LOW RISK PATIENT  Once         03/06/23 1926     Place sequential compression device  Until discontinued         03/06/23 1926                Discharge Planning   KACI:      Code  Status: Full Code   Is the patient medically ready for discharge?:     Reason for patient still in hospital (select all that apply): Patient trending condition  Discharge Plan A: Home                  Naga Chan MD  Department of Hospital Medicine   [facility] - Telemetry Stepdown

## 2023-03-09 PROBLEM — S51.812A FOREARM LACERATION, LEFT, INITIAL ENCOUNTER: Status: ACTIVE | Noted: 2023-03-09

## 2023-03-09 LAB
ALBUMIN SERPL BCP-MCNC: 3.7 G/DL (ref 3.5–5.2)
ALP SERPL-CCNC: 76 U/L (ref 55–135)
ALT SERPL W/O P-5'-P-CCNC: 9 U/L (ref 10–44)
ANION GAP SERPL CALC-SCNC: 8 MMOL/L (ref 8–16)
AST SERPL-CCNC: 15 U/L (ref 10–40)
BASOPHILS # BLD AUTO: 0.03 K/UL (ref 0–0.2)
BASOPHILS NFR BLD: 0.4 % (ref 0–1.9)
BILIRUB SERPL-MCNC: 0.7 MG/DL (ref 0.1–1)
BUN SERPL-MCNC: 8 MG/DL (ref 6–20)
CALCIUM SERPL-MCNC: 9.8 MG/DL (ref 8.7–10.5)
CHLORIDE SERPL-SCNC: 108 MMOL/L (ref 95–110)
CO2 SERPL-SCNC: 23 MMOL/L (ref 23–29)
CREAT SERPL-MCNC: 0.8 MG/DL (ref 0.5–1.4)
DIFFERENTIAL METHOD: NORMAL
EOSINOPHIL # BLD AUTO: 0.2 K/UL (ref 0–0.5)
EOSINOPHIL NFR BLD: 2.8 % (ref 0–8)
ERYTHROCYTE [DISTWIDTH] IN BLOOD BY AUTOMATED COUNT: 11.9 % (ref 11.5–14.5)
EST. GFR  (NO RACE VARIABLE): >60 ML/MIN/1.73 M^2
GLUCOSE SERPL-MCNC: 73 MG/DL (ref 70–110)
HCT VFR BLD AUTO: 39 % (ref 37–48.5)
HGB BLD-MCNC: 13.2 G/DL (ref 12–16)
IMM GRANULOCYTES # BLD AUTO: 0.01 K/UL (ref 0–0.04)
IMM GRANULOCYTES NFR BLD AUTO: 0.1 % (ref 0–0.5)
LYMPHOCYTES # BLD AUTO: 2.1 K/UL (ref 1–4.8)
LYMPHOCYTES NFR BLD: 31.6 % (ref 18–48)
MAGNESIUM SERPL-MCNC: 1.8 MG/DL (ref 1.6–2.6)
MCH RBC QN AUTO: 30.1 PG (ref 27–31)
MCHC RBC AUTO-ENTMCNC: 33.8 G/DL (ref 32–36)
MCV RBC AUTO: 89 FL (ref 82–98)
MONOCYTES # BLD AUTO: 0.6 K/UL (ref 0.3–1)
MONOCYTES NFR BLD: 8.4 % (ref 4–15)
NEUTROPHILS # BLD AUTO: 3.8 K/UL (ref 1.8–7.7)
NEUTROPHILS NFR BLD: 56.7 % (ref 38–73)
NRBC BLD-RTO: 0 /100 WBC
PHOSPHATE SERPL-MCNC: 4 MG/DL (ref 2.7–4.5)
PLATELET # BLD AUTO: 209 K/UL (ref 150–450)
PMV BLD AUTO: 9.9 FL (ref 9.2–12.9)
POTASSIUM SERPL-SCNC: 3.6 MMOL/L (ref 3.5–5.1)
PROT SERPL-MCNC: 6 G/DL (ref 6–8.4)
RBC # BLD AUTO: 4.39 M/UL (ref 4–5.4)
SODIUM SERPL-SCNC: 139 MMOL/L (ref 136–145)
WBC # BLD AUTO: 6.75 K/UL (ref 3.9–12.7)

## 2023-03-09 PROCEDURE — 99232 PR SUBSEQUENT HOSPITAL CARE,LEVL II: ICD-10-PCS | Mod: ,,, | Performed by: PSYCHIATRY & NEUROLOGY

## 2023-03-09 PROCEDURE — 93010 ELECTROCARDIOGRAM REPORT: CPT | Mod: ,,, | Performed by: INTERNAL MEDICINE

## 2023-03-09 PROCEDURE — 93010 EKG 12-LEAD: ICD-10-PCS | Mod: ,,, | Performed by: INTERNAL MEDICINE

## 2023-03-09 PROCEDURE — 80053 COMPREHEN METABOLIC PANEL: CPT | Performed by: STUDENT IN AN ORGANIZED HEALTH CARE EDUCATION/TRAINING PROGRAM

## 2023-03-09 PROCEDURE — 93005 ELECTROCARDIOGRAM TRACING: CPT

## 2023-03-09 PROCEDURE — 36415 COLL VENOUS BLD VENIPUNCTURE: CPT | Performed by: STUDENT IN AN ORGANIZED HEALTH CARE EDUCATION/TRAINING PROGRAM

## 2023-03-09 PROCEDURE — 99232 SBSQ HOSP IP/OBS MODERATE 35: CPT | Mod: ,,, | Performed by: HOSPITALIST

## 2023-03-09 PROCEDURE — 20600001 HC STEP DOWN PRIVATE ROOM

## 2023-03-09 PROCEDURE — 83735 ASSAY OF MAGNESIUM: CPT | Performed by: STUDENT IN AN ORGANIZED HEALTH CARE EDUCATION/TRAINING PROGRAM

## 2023-03-09 PROCEDURE — 85025 COMPLETE CBC W/AUTO DIFF WBC: CPT | Performed by: STUDENT IN AN ORGANIZED HEALTH CARE EDUCATION/TRAINING PROGRAM

## 2023-03-09 PROCEDURE — 84100 ASSAY OF PHOSPHORUS: CPT | Performed by: STUDENT IN AN ORGANIZED HEALTH CARE EDUCATION/TRAINING PROGRAM

## 2023-03-09 PROCEDURE — 99232 PR SUBSEQUENT HOSPITAL CARE,LEVL II: ICD-10-PCS | Mod: ,,, | Performed by: HOSPITALIST

## 2023-03-09 PROCEDURE — 99232 SBSQ HOSP IP/OBS MODERATE 35: CPT | Mod: ,,, | Performed by: PSYCHIATRY & NEUROLOGY

## 2023-03-09 NOTE — PROGRESS NOTES
"CONSULTATION LIAISON PSYCHIATRY PROGRESS NOTE    Patient Name: Sailaja Carbajal  MRN: [id number]  Patient Class: IP- Inpatient  Admission Date: 3/6/2023  Attending Physician: Darshana Spann MD      SUBJECTIVE:   Sailaja Carbajal is a 17 y.o. female with past psychiatric history of bipolar 1 disorder, anorexia nervosa, depression who presents to the ED/admitted to the hospital for Intentional drug overdose. Patient reportedly ingested 3 bottles of lithium 300 mg tablets and 2 bottles of Ativan 0.5 mg tablets in a suicide attempt. She was drowsy but arousable and hemodynamically stable in the ED. Initial labs largely unremarkable. Lithium level 0.8 > 0.7 > 0.6. EKG with sinus tachycardia. Urine drug screen positive for THC. She was admitted to hospital medicine for further monitoring.      Psychiatry consulted for "suicide attempt, intentional drug overdose, history of bipolar 1 disorder"       Today,   Pt reports slept ok, has been eating some but less than half of meals.  Endorses mood is pretty good at this time, having family visit and sitter to talk to has been helping.  Denies SI, HI, AVH.  Thoughts of self harm have improved.  Does have mild anxiety.  Is ok with resuming lithium when appropriate.  Reports does not recall details of immediate presentation, however feels better physically and without dizziness at this time.   Pt today reports being OK with going to a eating disorder program with co-mood disorder treatment, and her aunt is looking at a facility in [place] and determining whether will be feasible or with any insurance coverage. Pt today prefers this over general psych facility.        Collateral:  Spoke with pt Aunt, Amrit Carbajal at [phone]  She states pt has been doing better last 2 days at the hospital but she is still very concerned with pt's recent condition and seriousness of this SA. She has been considering an eating disorder program at [facility] in [place] but she " "doesn't know if insurance will cover or if it will be affordable. She does not feel comfortable at this time to take pt home and is still concerned for her safety.  Informed aunt to look into [facility] which pt has been admitted to in the past, as there are other branches of this center. Aunt's only concern with inpt psych is that pt did not have a good experience with her previous admit at [facility].  She also feels that pt has a significant mood and anxiety component that is prohibiting pt from making improvements in eating disorder.       OBJECTIVE:    Mental Status Exam:  Appearance: unremarkable, age appropriate  Behavior/Cooperation: normal, cooperative, psychomotor retardation, eye contact normal  Speech: normal tone, normal rate, normal pitch, soft  Mood: "pretty good"  Affect: constricted, slight smiling  Thought Process: normal and logical  Thought Content: normal, no suicidality, no homicidality, delusions, or paranoia  Orientation: grossly intact  Memory: Grossly intact  Attention Span/Concentration: Normal  Insight: fair  Judgment: fair    CAM ICU positive? no      ASSESSMENT & RECOMMENDATIONS   Intentional Overdose Lithium  Lithium toxicity  Bipolar 1 disorder, mre depressed  R/o Unipolar depressive disorder  Anorexia Nervosa, restricting type at risk for REFEEDING syndrome  R/O Borderline personality disorder     BIPOLAR I disorder, mre depressed  PSYCH MEDICATIONS  Scheduled: Hold psych meds at this time.  PRN: none at this time.   Labs/other:  Recommend continued monitoring of renal function, EKG for cardiac rhythm.     Anorexia Nervosa, restricting type  Pt with recent restriction in food intake of eating 1 meal per week and drinking mostly coffee "to survive".    Recommend continued monitoring/repleting of Mg and Phos and continued monitoring for refeeding syndrome as pt resumes diet.      RISK ASSESSMENT  NEEDS PEC because patient is in imminent danger of hurting self or others and is gravely disabled. & " NEEDS 1:1 sitter     FOLLOW UP  Will follow up while in house     DISPOSITION - once medically cleared:   Plan A is if pt can attend out of state Residential Program for Eating disorders and mood disorders.   If not possible, Plan B will be to seek involuntary inpatient psychiatric admission for stabilization of acute psychiatric symptoms and a safe disposition plan is enacted. The patient &/or their family was informed that the patient will be transferred to an inpatient unit per ED/primary placement team.      Please contact ON CALL psychiatry service (24/7) for any acute issues that may arise.        Case discussed with Dr. Mishra.        Angel Shaikh MD  CL Psychiatry  [facility]  3/9/2023 10:33 AM        --------------------------------------------------------------------------------------------------------------------------------------------------------------------------------------------------------------------------------------    CONTINUED OBJECTIVE clinical data & findings reviewed and noted for above decision making    Current Medications:   Scheduled Meds:   PRN Meds: acetaminophen, dextrose 10%, dextrose 10%, dextrose, dextrose, glucagon (human recombinant), naloxone, ondansetron, promethazine, sodium chloride 0.9%, DIPH,PERTUSS(ACELL),TET VACCINE (ADULT)(BOOSTRIX,ADACEL)    Allergies:   Review of patient's allergies indicates:  No Known Allergies    Vitals  Vitals:    03/09/23 0748   BP: 113/67   Pulse: 88   Resp: 18   Temp: 97.3 °F (36.3 °C)       Labs/Imaging/Studies:  Recent Results (from the past 24 hour(s))   Comprehensive Metabolic Panel (CMP)    Collection Time: 03/09/23  3:30 AM   Result Value Ref Range    Sodium 139 136 - 145 mmol/L    Potassium 3.6 3.5 - 5.1 mmol/L    Chloride 108 95 - 110 mmol/L    CO2 23 23 - 29 mmol/L    Glucose 73 70 - 110 mg/dL    BUN 8 6 - 20 mg/dL    Creatinine 0.8 0.5 - 1.4 mg/dL    Calcium 9.8 8.7 - 10.5 mg/dL    Total Protein 6.0 6.0 - 8.4 g/dL     Albumin 3.7 3.5 - 5.2 g/dL    Total Bilirubin 0.7 0.1 - 1.0 mg/dL    Alkaline Phosphatase 76 55 - 135 U/L    AST 15 10 - 40 U/L    ALT 9 (L) 10 - 44 U/L    Anion Gap 8 8 - 16 mmol/L    eGFR >60.0 >60 mL/min/1.73 m^2   CBC with Automated Differential    Collection Time: 03/09/23  3:30 AM   Result Value Ref Range    WBC 6.75 3.90 - 12.70 K/uL    RBC 4.39 4.00 - 5.40 M/uL    Hemoglobin 13.2 12.0 - 16.0 g/dL    Hematocrit 39.0 37.0 - 48.5 %    MCV 89 82 - 98 fL    MCH 30.1 27.0 - 31.0 pg    MCHC 33.8 32.0 - 36.0 g/dL    RDW 11.9 11.5 - 14.5 %    Platelets 209 150 - 450 K/uL    MPV 9.9 9.2 - 12.9 fL    Immature Granulocytes 0.1 0.0 - 0.5 %    Gran # (ANC) 3.8 1.8 - 7.7 K/uL    Immature Grans (Abs) 0.01 0.00 - 0.04 K/uL    Lymph # 2.1 1.0 - 4.8 K/uL    Mono # 0.6 0.3 - 1.0 K/uL    Eos # 0.2 0.0 - 0.5 K/uL    Baso # 0.03 0.00 - 0.20 K/uL    nRBC 0 0 /100 WBC    Gran % 56.7 38.0 - 73.0 %    Lymph % 31.6 18.0 - 48.0 %    Mono % 8.4 4.0 - 15.0 %    Eosinophil % 2.8 0.0 - 8.0 %    Basophil % 0.4 0.0 - 1.9 %    Differential Method Automated    Phosphorus    Collection Time: 03/09/23  3:30 AM   Result Value Ref Range    Phosphorus 4.0 2.7 - 4.5 mg/dL   Magnesium    Collection Time: 03/09/23  3:30 AM   Result Value Ref Range    Magnesium 1.8 1.6 - 2.6 mg/dL     Imaging Results              X-Ray Abdomen AP 1 View (KUB) (Final result)  Result time 03/06/23 15:44:11      Final result by Bienvenido Can MD (03/06/23 15:44:11)                   Impression:      1. Nonobstructive bowel gas pattern.      Electronically signed by: Bienvenido Can MD  Date:    03/06/2023  Time:    15:44               Narrative:    EXAMINATION:  XR ABDOMEN AP 1 VIEW    CLINICAL HISTORY:  Toxic effect of unspecified substance, accidental (unintentional), initial encounter    TECHNIQUE:  AP View(s) of the abdomen was performed.    COMPARISON:  None    FINDINGS:  Single-view abdomen supine.    Air and stool is seen within the large bowel and projected  over the rectum.  No focally dilated small bowel loops.  There are no calcifications to convincingly suggest nephrolithiasis or cholelithiasis.  There is levo scoliotic curvature of the spine.  No findings to suggest pneumatosis.                                       X-Ray Forearm Left (Final result)  Result time 03/06/23 15:44:59      Final result by Bienvenido Can MD (03/06/23 15:44:59)                   Impression:      As above      Electronically signed by: Bienvenido Can MD  Date:    03/06/2023  Time:    15:44               Narrative:    EXAMINATION:  XR FOREARM LEFT    CLINICAL HISTORY:  Unspecified open wound of unspecified upper arm, initial encounter    TECHNIQUE:  AP and lateral views of the left forearm were performed.    COMPARISON:  None    FINDINGS:  Two views left forearm.    No acute displaced fracture or dislocation of the forearm.  No radiopaque foreign body.  There is subcutaneous emphysema along the distal ulnar aspect of the forearm, correlation with any laceration in the region.

## 2023-03-09 NOTE — MEDICAL/APP STUDENT
"CONSULTATION LIAISON PSYCHIATRY PROGRESS NOTE    Patient Name: Sailaja Carbajal  MRN: [id number]  Patient Class: IP- Inpatient  Admission Date: 3/6/2023  Attending Physician: Darshana Spann MD      SUBJECTIVE:   Sailaja Carbajal is a 17 y.o. female with past psychiatric history of bipolar I, anorexia nervosa, depression presents to the ED/admitted to the hospital for Intentional drug overdose. Pt ingested 3x bottles of Lithium and 1x bottle of ativan in a suicide attempt 03/06.     Psychiatry consulted for "suicide attempt, intentional drug overdose, history of bipolar 1 disorder"    Today, the patient reports a better mood than yesterday. She appears calm, slightly constricted in affect, had just gotten out of the shower and says she took a walk with the sitter around the unit earlier this morning. She denies SI, HI and thoughts of self-harm. Says she doesn't feel as depressed but still has anxiety that is similar to her baseline before hospital admission. Pt says she slept well aside from waking up for frequent vital checks by staff. Pt has been eating minimally but states her dizziness has gotten better.    Discussed with the patient options for transfer and is amenable to attending a residential facility that treats her eating disorder and mood disorder. She says she has spoken with her aunt who is seeking insurance approval for the facility.        OBJECTIVE:    Mental Status Exam:  Appearance: unremarkable, age appropriate, neatly groomed  Behavior/Cooperation: cooperative but quiet  Speech: normal tone, normal rate, normal pitch, soft  Mood: anxious  Affect: constricted  Thought Process: normal and logical  Thought Content:  persistent thoughts related to eating disorder, reduced thoughts of self-harm.    Orientation: person, place, situation, time/date  Memory: Intact  Attention Span/Concentration: Normal  Insight: fair, increased awareness of need for treatment for eating " disorder  Judgment: fair    CAM ICU positive? no      ASSESSMENT & RECOMMENDATIONS   (Please list each relevant SPECIFIC psychiatric DSMV or medical diagnosis and recs for it under the listing DO NOT WRITE AN IMPRESSION)    PSYCH MEDICATIONS  Hold all home psych medications  PRN: recommend ativan 0.5mg PRN BID for anxiety      RISK ASSESSMENT  NEEDS PEC for SI/HI or grave disability & NEEDS 1:1 sitter  NO NEED FOR PEC vs. UNCONTESTED    FOLLOW UP  Will follow up while in house    DISPOSITION - once medically cleared:  Seek involuntary inpatient psychiatric admission for stabilization of acute psychiatric symptoms and a safe disposition plan is enacted.     Please contact ON CALL psychiatry service (24/7) for any acute issues that may arise.    Dr. Jean Lund   Psychiatry  [facility]  3/9/2023 10:29 AM        --------------------------------------------------------------------------------------------------------------------------------------------------------------------------------------------------------------------------------------    CONTINUED OBJECTIVE clinical data & findings reviewed and noted for above decision making    Current Medications:   Scheduled Meds:   PRN Meds: acetaminophen, dextrose 10%, dextrose 10%, dextrose, dextrose, glucagon (human recombinant), naloxone, ondansetron, promethazine, sodium chloride 0.9%, DIPH,PERTUSS(ACELL),TET VACCINE (ADULT)(BOOSTRIX,ADACEL)    Allergies:   Review of patient's allergies indicates:  No Known Allergies    Vitals  Vitals:    03/09/23 0748   BP: 113/67   Pulse: 88   Resp: 18   Temp: 97.3 °F (36.3 °C)       Labs/Imaging/Studies:  Recent Results (from the past 24 hour(s))   Comprehensive Metabolic Panel (CMP)    Collection Time: 03/09/23  3:30 AM   Result Value Ref Range    Sodium 139 136 - 145 mmol/L    Potassium 3.6 3.5 - 5.1 mmol/L    Chloride 108 95 - 110 mmol/L    CO2 23 23 - 29 mmol/L    Glucose 73 70 - 110 mg/dL    BUN 8 6 - 20 mg/dL     Creatinine 0.8 0.5 - 1.4 mg/dL    Calcium 9.8 8.7 - 10.5 mg/dL    Total Protein 6.0 6.0 - 8.4 g/dL    Albumin 3.7 3.5 - 5.2 g/dL    Total Bilirubin 0.7 0.1 - 1.0 mg/dL    Alkaline Phosphatase 76 55 - 135 U/L    AST 15 10 - 40 U/L    ALT 9 (L) 10 - 44 U/L    Anion Gap 8 8 - 16 mmol/L    eGFR >60.0 >60 mL/min/1.73 m^2   CBC with Automated Differential    Collection Time: 03/09/23  3:30 AM   Result Value Ref Range    WBC 6.75 3.90 - 12.70 K/uL    RBC 4.39 4.00 - 5.40 M/uL    Hemoglobin 13.2 12.0 - 16.0 g/dL    Hematocrit 39.0 37.0 - 48.5 %    MCV 89 82 - 98 fL    MCH 30.1 27.0 - 31.0 pg    MCHC 33.8 32.0 - 36.0 g/dL    RDW 11.9 11.5 - 14.5 %    Platelets 209 150 - 450 K/uL    MPV 9.9 9.2 - 12.9 fL    Immature Granulocytes 0.1 0.0 - 0.5 %    Gran # (ANC) 3.8 1.8 - 7.7 K/uL    Immature Grans (Abs) 0.01 0.00 - 0.04 K/uL    Lymph # 2.1 1.0 - 4.8 K/uL    Mono # 0.6 0.3 - 1.0 K/uL    Eos # 0.2 0.0 - 0.5 K/uL    Baso # 0.03 0.00 - 0.20 K/uL    nRBC 0 0 /100 WBC    Gran % 56.7 38.0 - 73.0 %    Lymph % 31.6 18.0 - 48.0 %    Mono % 8.4 4.0 - 15.0 %    Eosinophil % 2.8 0.0 - 8.0 %    Basophil % 0.4 0.0 - 1.9 %    Differential Method Automated    Phosphorus    Collection Time: 03/09/23  3:30 AM   Result Value Ref Range    Phosphorus 4.0 2.7 - 4.5 mg/dL   Magnesium    Collection Time: 03/09/23  3:30 AM   Result Value Ref Range    Magnesium 1.8 1.6 - 2.6 mg/dL     Imaging Results              X-Ray Abdomen AP 1 View (KUB) (Final result)  Result time 03/06/23 15:44:11      Final result by Bienvenido Can MD (03/06/23 15:44:11)                   Impression:      1. Nonobstructive bowel gas pattern.      Electronically signed by: Bienvenido Can MD  Date:    03/06/2023  Time:    15:44               Narrative:    EXAMINATION:  XR ABDOMEN AP 1 VIEW    CLINICAL HISTORY:  Toxic effect of unspecified substance, accidental (unintentional), initial encounter    TECHNIQUE:  AP View(s) of the abdomen was  performed.    COMPARISON:  None    FINDINGS:  Single-view abdomen supine.    Air and stool is seen within the large bowel and projected over the rectum.  No focally dilated small bowel loops.  There are no calcifications to convincingly suggest nephrolithiasis or cholelithiasis.  There is levo scoliotic curvature of the spine.  No findings to suggest pneumatosis.                                       X-Ray Forearm Left (Final result)  Result time 03/06/23 15:44:59      Final result by Bienvenido Can MD (03/06/23 15:44:59)                   Impression:      As above      Electronically signed by: Bienvenido Can MD  Date:    03/06/2023  Time:    15:44               Narrative:    EXAMINATION:  XR FOREARM LEFT    CLINICAL HISTORY:  Unspecified open wound of unspecified upper arm, initial encounter    TECHNIQUE:  AP and lateral views of the left forearm were performed.    COMPARISON:  None    FINDINGS:  Two views left forearm.    No acute displaced fracture or dislocation of the forearm.  No radiopaque foreign body.  There is subcutaneous emphysema along the distal ulnar aspect of the forearm, correlation with any laceration in the region.

## 2023-03-09 NOTE — ASSESSMENT & PLAN NOTE
Holding all home psych meds per psych recommendation.  inpt psych to decide on what meds to start pts on.

## 2023-03-09 NOTE — ASSESSMENT & PLAN NOTE
Hx of bipolar disorder type 1. Reports being depressed. Present with intentional drug OD, No hx of prior suicide attempt but hx of self inflicting lacerations      PEC'ed in ED. Psych consulted. Pt will need inpt psych admission.  was notified. Pt is medically cleared.

## 2023-03-09 NOTE — SUBJECTIVE & OBJECTIVE
Interval History: feels ok, denies any hallucination, suicidal ideation or homicidal ideations.     Review of Systems   Constitutional:  Negative for chills and fever.   HENT:  Negative for sore throat.    Respiratory:  Negative for cough and shortness of breath.    Cardiovascular:  Negative for chest pain and palpitations.   Gastrointestinal:  Negative for abdominal pain, nausea and vomiting.   Endocrine: Negative for polydipsia and polyphagia.   Genitourinary:  Negative for dysuria, flank pain, frequency and urgency.   Neurological:  Negative for seizures.   Psychiatric/Behavioral:  Negative for confusion.    Objective:     Vital Signs (Most Recent):  Temp: 97.3 °F (36.3 °C) (03/09/23 0748)  Pulse: 88 (03/09/23 0748)  Resp: 18 (03/09/23 0748)  BP: 113/67 (03/09/23 0748)  SpO2: 96 % (03/09/23 0748) Vital Signs (24h Range):  Temp:  [97.3 °F (36.3 °C)-99.3 °F (37.4 °C)] 97.3 °F (36.3 °C)  Pulse:  [83-92] 88  Resp:  [16-18] 18  SpO2:  [94 %-97 %] 96 %  BP: (113-122)/(62-73) 113/67     Weight: 68 kg (149 lb 14.6 oz)  Body mass index is 24.2 kg/m².  No intake or output data in the 24 hours ending 03/09/23 1454   Physical Exam  Vitals reviewed.   Constitutional:       Appearance: Normal appearance.   HENT:      Head: Normocephalic and atraumatic.      Mouth/Throat:      Mouth: Mucous membranes are moist.   Eyes:      Extraocular Movements: Extraocular movements intact.      Conjunctiva/sclera: Conjunctivae normal.      Pupils: Pupils are equal, round, and reactive to light.   Cardiovascular:      Rate and Rhythm: Normal rate and regular rhythm.      Pulses: Normal pulses.   Pulmonary:      Effort: Pulmonary effort is normal.      Breath sounds: Normal breath sounds.   Abdominal:      General: Abdomen is flat. Bowel sounds are normal.      Palpations: Abdomen is soft.   Musculoskeletal:         General: Normal range of motion.      Cervical back: Normal range of motion.   Skin:     General: Skin is warm.   Neurological:       General: No focal deficit present.      Mental Status: She is alert and oriented to person, place, and time.       Significant Labs: All pertinent labs within the past 24 hours have been reviewed.  BMP:   Recent Labs   Lab 03/09/23  0330   GLU 73      K 3.6      CO2 23   BUN 8   CREATININE 0.8   CALCIUM 9.8   MG 1.8     CBC:   Recent Labs   Lab 03/08/23  0546 03/09/23  0330   WBC 5.85 6.75   HGB 12.3 13.2   HCT 37.2 39.0    209     CMP:   Recent Labs   Lab 03/08/23  0546 03/09/23  0330    139   K 3.9 3.6   * 108   CO2 20* 23   GLU 68* 73   BUN 6 8   CREATININE 0.7 0.8   CALCIUM 8.6* 9.8   PROT 5.4* 6.0   ALBUMIN 3.3* 3.7   BILITOT 0.6 0.7   ALKPHOS 76 76   AST 13 15   ALT 8* 9*   ANIONGAP 8 8       Significant Imaging: I have reviewed all pertinent imaging results/findings within the past 24 hours.

## 2023-03-09 NOTE — PLAN OF CARE
Problem: Violence Risk or Actual  Goal: Anger and Impulse Control  Outcome: Ongoing, Progressing     Problem: Adult Inpatient Plan of Care  Goal: Plan of Care Review  Outcome: Ongoing, Progressing  Goal: Patient-Specific Goal (Individualized)  Outcome: Ongoing, Progressing  Goal: Absence of Hospital-Acquired Illness or Injury  Outcome: Ongoing, Progressing  Goal: Optimal Comfort and Wellbeing  Outcome: Ongoing, Progressing  Goal: Readiness for Transition of Care  Outcome: Ongoing, Progressing     Problem: Impaired Wound Healing  Goal: Optimal Wound Healing  Outcome: Ongoing, Progressing

## 2023-03-09 NOTE — ASSESSMENT & PLAN NOTE
Benzodiazepine and lithium overdose  7 y.o. female with history of bipolar 1, anorexia, depression, presented with complaints of drug overdose. Patient reported that she is prescribed lithium, Ativan, aripiprazole for bipolar 1 and anorexia.  At about 11:00 a.m. on 03/06/2023 she took 3 bottles of 300 mg lithium tablets and 2 bottles of 10 mg Ativan tablets.  Patient has been depressed recently and admits this was a suicide attempt.  She does not have a history of prior suicide attempts but has significant history of self-harm including self cutting.  She have used a  to cut her forearms.      hemodynamically stable at the moment. Maintaining airway. Drowsy but easily arousable.     Creatinine 0.9.  Bicarb 20.  Glucose 91.  TSH 0.436.  Salicylate and Tylenol levels negative.    Lithium level 0.8, 0.7, 0.6.    Urine drug test positive for marijuana metabolite   Xray abd with Nonobstructive bowel gas pattern.   EKG with sinus tachycardia.    ED physician discussed with  who recommended step-down admission.  No indication for emergent dialysis at this time.  Patient was PEC'd in ED.    Admitted to stepdown for close monitoring. Continue to monitor vitals. Vitals are stable, labs are stable.   Received Aggressive IVF hydration. Encourage PO hydration   Continue supportive management  Psych consult. Need inpt psych admits. Pt is medically stable.

## 2023-03-09 NOTE — PROGRESS NOTES
[facility] Tele[facility] Medicine  Progress Note    Patient Name: Sailaja Carbajal  MRN: [id number]  Patient Class: IP- Inpatient   Admission Date: 3/6/2023  Length of Stay: 3 days  Attending Physician: Darshana Spann MD  Primary Care Provider: Primary Doctor No        Subjective:     Principal Problem:Intentional drug overdose        HPI:  17 y.o. female with history of bipolar 1, anorexia, depression, presented with complaints of drug overdose. Patient reported that she is prescribed lithium, Ativan, aripiprazole for bipolar 1 and anorexia.  She is only been taking her aripiprazole for the past few weeks to months.  Patient states at about 11:00 a.m. on 03/06/2023 she took 3 bottles of 300 mg lithium tablets and 2 bottles of 10 mg Ativan tablets.  Patient has been depressed recently and admits this was a suicide attempt.  She does not have a history of prior suicide attempts but has significant history of self-harm including self cutting.  She have used a  to cut her forearms. On my interview, patient is tearful and says that she does not want to discuss anything at this moment but is agreeable to talk more tomorrow. She reports feeling depressed.      On arrival to ED, patient was found to be hemodynamically stable.  She was drowsy but easily arousable.  Patient has been maintaining her airway and is on room air.  Initial labs with no leukocytosis.  Creatinine 0.9.  Bicarb 20.  Glucose 91.  TSH 0.436.  Salicylate and Tylenol levels negative.  Lithium level 0.8, 0.7 10.6.  Urine pregnancy test negative.  Urine drug test positive for marijuana metabolite  Xray abd with Nonobstructive bowel gas pattern. EKG with sinus tachycardia.  ED physician discussed with  who recommended step-down admission.  No indication for emergent dialysis at this time.  Patient was PEC'd.  She had a self-inflicted laceration to left wrist which was repaired in ED. Patient was admitted under  care of Hospital Medicine for further evaluation        Overview/Hospital Course:  17 y.o. female with history of bipolar 1, anorexia, depression, presented with complaints of drug overdose. She took 3 bottles of 300 mg lithium tablets and 2 bottles of 10 mg Ativan tablets.  Patient has been depressed recently and admits this was a suicide attempt.  recommended supportive management with step-down admission. Psych consulted. PEC'ed. Per RN , THAIS placed in chart 3/7.     - More alert and awake.   - FU psych recs    On 3/9; pt is medically cleared. Will discharge to inpatient psych unit until family find a eating disorder program.        Interval History: feels ok, denies any hallucination, suicidal ideation or homicidal ideations.     Review of Systems   Constitutional:  Negative for chills and fever.   HENT:  Negative for sore throat.    Respiratory:  Negative for cough and shortness of breath.    Cardiovascular:  Negative for chest pain and palpitations.   Gastrointestinal:  Negative for abdominal pain, nausea and vomiting.   Endocrine: Negative for polydipsia and polyphagia.   Genitourinary:  Negative for dysuria, flank pain, frequency and urgency.   Neurological:  Negative for seizures.   Psychiatric/Behavioral:  Negative for confusion.    Objective:     Vital Signs (Most Recent):  Temp: 97.3 °F (36.3 °C) (03/09/23 0748)  Pulse: 88 (03/09/23 0748)  Resp: 18 (03/09/23 0748)  BP: 113/67 (03/09/23 0748)  SpO2: 96 % (03/09/23 0748) Vital Signs (24h Range):  Temp:  [97.3 °F (36.3 °C)-99.3 °F (37.4 °C)] 97.3 °F (36.3 °C)  Pulse:  [83-92] 88  Resp:  [16-18] 18  SpO2:  [94 %-97 %] 96 %  BP: (113-122)/(62-73) 113/67     Weight: 68 kg (149 lb 14.6 oz)  Body mass index is 24.2 kg/m².  No intake or output data in the 24 hours ending 03/09/23 1454   Physical Exam  Vitals reviewed.   Constitutional:       Appearance: Normal appearance.   HENT:      Head: Normocephalic and atraumatic.      Mouth/Throat:      Mouth:  Mucous membranes are moist.   Eyes:      Extraocular Movements: Extraocular movements intact.      Conjunctiva/sclera: Conjunctivae normal.      Pupils: Pupils are equal, round, and reactive to light.   Cardiovascular:      Rate and Rhythm: Normal rate and regular rhythm.      Pulses: Normal pulses.   Pulmonary:      Effort: Pulmonary effort is normal.      Breath sounds: Normal breath sounds.   Abdominal:      General: Abdomen is flat. Bowel sounds are normal.      Palpations: Abdomen is soft.   Musculoskeletal:         General: Normal range of motion.      Cervical back: Normal range of motion.   Skin:     General: Skin is warm.   Neurological:      General: No focal deficit present.      Mental Status: She is alert and oriented to person, place, and time.       Significant Labs: All pertinent labs within the past 24 hours have been reviewed.  BMP:   Recent Labs   Lab 03/09/23  0330   GLU 73      K 3.6      CO2 23   BUN 8   CREATININE 0.8   CALCIUM 9.8   MG 1.8     CBC:   Recent Labs   Lab 03/08/23  0546 03/09/23  0330   WBC 5.85 6.75   HGB 12.3 13.2   HCT 37.2 39.0    209     CMP:   Recent Labs   Lab 03/08/23  0546 03/09/23  0330    139   K 3.9 3.6   * 108   CO2 20* 23   GLU 68* 73   BUN 6 8   CREATININE 0.7 0.8   CALCIUM 8.6* 9.8   PROT 5.4* 6.0   ALBUMIN 3.3* 3.7   BILITOT 0.6 0.7   ALKPHOS 76 76   AST 13 15   ALT 8* 9*   ANIONGAP 8 8       Significant Imaging: I have reviewed all pertinent imaging results/findings within the past 24 hours.      Assessment/Plan:      * Intentional drug overdose  Benzodiazepine and lithium overdose  7 y.o. female with history of bipolar 1, anorexia, depression, presented with complaints of drug overdose. Patient reported that she is prescribed lithium, Ativan, aripiprazole for bipolar 1 and anorexia.  At about 11:00 a.m. on 03/06/2023 she took 3 bottles of 300 mg lithium tablets and 2 bottles of 10 mg Ativan tablets.  Patient has been depressed  recently and admits this was a suicide attempt.  She does not have a history of prior suicide attempts but has significant history of self-harm including self cutting.  She have used a  to cut her forearms.      hemodynamically stable at the moment. Maintaining airway. Drowsy but easily arousable.     Creatinine 0.9.  Bicarb 20.  Glucose 91.  TSH 0.436.  Salicylate and Tylenol levels negative.    Lithium level 0.8, 0.7, 0.6.    Urine drug test positive for marijuana metabolite   Xray abd with Nonobstructive bowel gas pattern.   EKG with sinus tachycardia.    ED physician discussed with  who recommended step-down admission.  No indication for emergent dialysis at this time.  Patient was PEC'd in ED.    Admitted to stepdown for close monitoring. Continue to monitor vitals. Vitals are stable, labs are stable.   Received Aggressive IVF hydration. Encourage PO hydration   Continue supportive management  Psych consult. Need inpt psych admits. Pt is medically stable.         Suicide attempt  Hx of bipolar disorder type 1. Reports being depressed. Present with intentional drug OD, No hx of prior suicide attempt but hx of self inflicting lacerations      PEC'ed in ED. Psych consulted. Pt will need inpt psych admission.  was notified. Pt is medically cleared.     Forearm laceration, left, initial encounter  Pt used  to injure herself.   4 cm laceration that required 4 sutures performed by the ER.   Sutures to be removed 7-10 days post placement on 3/6.       Anorexia  Electrolytes are stable.   Pt needs inpt psych admit.       Bipolar 1 disorder  Holding all home psych meds per psych recommendation.  inpt psych to decide on what meds to start pts on.         VTE Risk Mitigation (From admission, onward)           Ordered     IP VTE LOW RISK PATIENT  Once         03/06/23 1926     Place sequential compression device  Until discontinued         03/06/23 1926                     Discharge Planning   KACI: 3/9/2023     Code Status: Full Code   Is the patient medically ready for discharge?:     Reason for patient still in hospital (select all that apply): Patient trending condition  Discharge Plan A: Home                  Darshana Spann MD  Department of Hospital Medicine   [facility] - Telemetry Stepdown

## 2023-03-09 NOTE — ASSESSMENT & PLAN NOTE
Pt used  to injure herself.   4 cm laceration that required 4 sutures performed by the ER.   Sutures to be removed 7-10 days post placement on 3/6.

## 2023-03-09 NOTE — PLAN OF CARE
03/09/23 1415   Post-Acute Status   Post-Acute Authorization Placement   Post-Acute Placement Status   (ADT32 ordered)     SW uploaded CEC into media and entered ADT32 order for inpatient psych placement arrangement.    2:49 PM  Per Yoko at [facility], will need MD progress note stating medical clearance for patient as soon as possible. Updated MD.    Abimbola Franklin, ELDON  [facility]  Ext. [phone]

## 2023-03-09 NOTE — NURSING
DC order received. Report called to [facility]- report given to Brina, questions answered.  time set for 2000. Report called to #[phone]

## 2023-03-10 VITALS
TEMPERATURE: 98 F | SYSTOLIC BLOOD PRESSURE: 116 MMHG | DIASTOLIC BLOOD PRESSURE: 70 MMHG | RESPIRATION RATE: 18 BRPM | OXYGEN SATURATION: 98 % | HEIGHT: 66 IN | HEART RATE: 90 BPM | BODY MASS INDEX: 24.1 KG/M2 | WEIGHT: 149.94 LBS

## 2023-03-10 LAB
ALBUMIN SERPL BCP-MCNC: 3.9 G/DL (ref 3.5–5.2)
ALP SERPL-CCNC: 81 U/L (ref 55–135)
ALT SERPL W/O P-5'-P-CCNC: 7 U/L (ref 10–44)
ANION GAP SERPL CALC-SCNC: 8 MMOL/L (ref 8–16)
AST SERPL-CCNC: 11 U/L (ref 10–40)
BASOPHILS # BLD AUTO: 0.02 K/UL (ref 0–0.2)
BASOPHILS NFR BLD: 0.4 % (ref 0–1.9)
BILIRUB SERPL-MCNC: 0.5 MG/DL (ref 0.1–1)
BUN SERPL-MCNC: 17 MG/DL (ref 6–20)
CALCIUM SERPL-MCNC: 9.8 MG/DL (ref 8.7–10.5)
CHLORIDE SERPL-SCNC: 109 MMOL/L (ref 95–110)
CO2 SERPL-SCNC: 23 MMOL/L (ref 23–29)
CREAT SERPL-MCNC: 0.7 MG/DL (ref 0.5–1.4)
DIFFERENTIAL METHOD: NORMAL
EOSINOPHIL # BLD AUTO: 0.2 K/UL (ref 0–0.5)
EOSINOPHIL NFR BLD: 3 % (ref 0–8)
ERYTHROCYTE [DISTWIDTH] IN BLOOD BY AUTOMATED COUNT: 11.8 % (ref 11.5–14.5)
EST. GFR  (NO RACE VARIABLE): >60 ML/MIN/1.73 M^2
GLUCOSE SERPL-MCNC: 78 MG/DL (ref 70–110)
HCT VFR BLD AUTO: 42.7 % (ref 37–48.5)
HGB BLD-MCNC: 13.9 G/DL (ref 12–16)
IMM GRANULOCYTES # BLD AUTO: 0.01 K/UL (ref 0–0.04)
IMM GRANULOCYTES NFR BLD AUTO: 0.2 % (ref 0–0.5)
LYMPHOCYTES # BLD AUTO: 2 K/UL (ref 1–4.8)
LYMPHOCYTES NFR BLD: 37.2 % (ref 18–48)
MAGNESIUM SERPL-MCNC: 1.9 MG/DL (ref 1.6–2.6)
MCH RBC QN AUTO: 29.1 PG (ref 27–31)
MCHC RBC AUTO-ENTMCNC: 32.6 G/DL (ref 32–36)
MCV RBC AUTO: 90 FL (ref 82–98)
MONOCYTES # BLD AUTO: 0.4 K/UL (ref 0.3–1)
MONOCYTES NFR BLD: 8.1 % (ref 4–15)
NEUTROPHILS # BLD AUTO: 2.8 K/UL (ref 1.8–7.7)
NEUTROPHILS NFR BLD: 51.1 % (ref 38–73)
NRBC BLD-RTO: 0 /100 WBC
PHOSPHATE SERPL-MCNC: 4 MG/DL (ref 2.7–4.5)
PLATELET # BLD AUTO: 229 K/UL (ref 150–450)
PMV BLD AUTO: 9.8 FL (ref 9.2–12.9)
POTASSIUM SERPL-SCNC: 3.9 MMOL/L (ref 3.5–5.1)
PROT SERPL-MCNC: 6.5 G/DL (ref 6–8.4)
RBC # BLD AUTO: 4.77 M/UL (ref 4–5.4)
SODIUM SERPL-SCNC: 140 MMOL/L (ref 136–145)
WBC # BLD AUTO: 5.4 K/UL (ref 3.9–12.7)

## 2023-03-10 PROCEDURE — 84100 ASSAY OF PHOSPHORUS: CPT | Performed by: STUDENT IN AN ORGANIZED HEALTH CARE EDUCATION/TRAINING PROGRAM

## 2023-03-10 PROCEDURE — 99238 PR HOSPITAL DISCHARGE DAY,<30 MIN: ICD-10-PCS | Mod: ,,, | Performed by: HOSPITALIST

## 2023-03-10 PROCEDURE — 85025 COMPLETE CBC W/AUTO DIFF WBC: CPT | Performed by: STUDENT IN AN ORGANIZED HEALTH CARE EDUCATION/TRAINING PROGRAM

## 2023-03-10 PROCEDURE — 93010 EKG 12-LEAD: ICD-10-PCS | Mod: ,,, | Performed by: INTERNAL MEDICINE

## 2023-03-10 PROCEDURE — 93005 ELECTROCARDIOGRAM TRACING: CPT

## 2023-03-10 PROCEDURE — 93010 ELECTROCARDIOGRAM REPORT: CPT | Mod: ,,, | Performed by: INTERNAL MEDICINE

## 2023-03-10 PROCEDURE — 36415 COLL VENOUS BLD VENIPUNCTURE: CPT | Performed by: STUDENT IN AN ORGANIZED HEALTH CARE EDUCATION/TRAINING PROGRAM

## 2023-03-10 PROCEDURE — 83735 ASSAY OF MAGNESIUM: CPT | Performed by: STUDENT IN AN ORGANIZED HEALTH CARE EDUCATION/TRAINING PROGRAM

## 2023-03-10 PROCEDURE — 99238 HOSP IP/OBS DSCHRG MGMT 30/<: CPT | Mod: ,,, | Performed by: HOSPITALIST

## 2023-03-10 PROCEDURE — 80053 COMPREHEN METABOLIC PANEL: CPT | Performed by: STUDENT IN AN ORGANIZED HEALTH CARE EDUCATION/TRAINING PROGRAM

## 2023-03-10 NOTE — PROGRESS NOTES
Please note that Ms. Carbajal was admitted at [facility] from 3/6 to 3/10. Please excuse her from school during these dates.     Regards,    Darshana Spann MD  [facility] Medicine.   3/10/23

## 2023-03-10 NOTE — NURSING
Discharge paperwork was given to Amos. Questions were asked and answers were given. Pt left the floor via stretcher with Amos off the unit.

## 2023-03-10 NOTE — PROGRESS NOTES
"CONSULTATION LIAISON PSYCHIATRY PROGRESS NOTE    Patient Name: Sailaja Carbajal  MRN: [id number]  Patient Class: IP- Inpatient  Admission Date: 3/6/2023  Attending Physician: No att. providers found      SUBJECTIVE:   Sailaja Carbajal is a 17 y.o. female with past psychiatric history of bipolar 1 disorder, anorexia nervosa, depression who presents to the ED/admitted to the hospital for Intentional drug overdose. Patient reportedly ingested 3 bottles of lithium 300 mg tablets and 2 bottles of Ativan 0.5 mg tablets in a suicide attempt. She was drowsy but arousable and hemodynamically stable in the ED. Initial labs largely unremarkable. Lithium level 0.8 > 0.7 > 0.6. EKG with sinus tachycardia. Urine drug screen positive for THC. She was admitted to hospital medicine for further monitoring.      Psychiatry consulted for "suicide attempt, intentional drug overdose, history of bipolar 1 disorder"       Today,   Pt seen, family including aunt in room visiting with pt.  Pt denies any changes, reports doing ok.  Pt and family aware of transfer to [facility].  They discuss further plan after psych admit being eating disorder treatment program including mood disorder treatment.  Denies other changes, questions or concerns.       OBJECTIVE:    Mental Status Exam:  Appearance: unremarkable, age appropriate  Behavior/Cooperation: normal, cooperative, eye contact normal  Speech: normal tone, normal rate, normal pitch, soft  Mood: "OK"  Affect: constricted, slight smiling  Thought Process: normal and logical  Thought Content: normal, no suicidality, no homicidality, delusions, or paranoia  Orientation: grossly intact  Memory: Grossly intact  Attention Span/Concentration: Normal  Insight: fair  Judgment: fair    CAM ICU positive? no      ASSESSMENT & RECOMMENDATIONS   Intentional Overdose Lithium  Lithium toxicity  Bipolar 1 disorder, mre depressed  R/o Unipolar depressive disorder  Anorexia Nervosa, restricting " "type at risk for REFEEDING syndrome  R/O Borderline personality disorder     BIPOLAR I disorder, mre depressed  PSYCH MEDICATIONS  Scheduled: Hold psych meds at this time.  PRN: none at this time.   Labs/other:  Recommend continued monitoring of renal function, EKG for cardiac rhythm.     Anorexia Nervosa, restricting type  Pt with recent restriction in food intake of eating 1 meal per week and drinking mostly coffee "to survive".    Recommend continued monitoring/repleting of Mg and Phos and continued monitoring for refeeding syndrome as pt resumes diet.      RISK ASSESSMENT  NEEDS PEC because patient is in imminent danger of hurting self or others and is gravely disabled. & NEEDS 1:1 sitter     FOLLOW UP  Will follow up while in house     DISPOSITION - once medically cleared:   Plan A is if pt can attend out of state Residential Program for Eating disorders and mood disorders.   If not possible, Plan B will be to seek involuntary inpatient psychiatric admission for stabilization of acute psychiatric symptoms and a safe disposition plan is enacted. The patient &/or their family was informed that the patient will be transferred to an inpatient unit per ED/primary placement team.      Please contact ON CALL psychiatry service (24/7) for any acute issues that may arise.        Case discussed with Dr. Mishra.        Angel Shaikh MD  CL Psychiatry  [facility]  3/10/2023 10:33 AM        --------------------------------------------------------------------------------------------------------------------------------------------------------------------------------------------------------------------------------------    CONTINUED OBJECTIVE clinical data & findings reviewed and noted for above decision making    Current Medications:   Scheduled Meds:   PRN Meds: acetaminophen, dextrose 10%, dextrose 10%, dextrose, dextrose, glucagon (human recombinant), naloxone, ondansetron, promethazine, sodium " chloride 0.9%, DIPH,PERTUSS(ACELL),TET VACCINE (ADULT)(BOOSTRIX,ADACEL)    Allergies:   Review of patient's allergies indicates:  No Known Allergies    Vitals  Vitals:    03/10/23 1122   BP: 116/70   Pulse: 90   Resp: 18   Temp: 97.7 °F (36.5 °C)       Labs/Imaging/Studies:  Recent Results (from the past 24 hour(s))   Comprehensive Metabolic Panel (CMP)    Collection Time: 03/10/23  4:44 AM   Result Value Ref Range    Sodium 140 136 - 145 mmol/L    Potassium 3.9 3.5 - 5.1 mmol/L    Chloride 109 95 - 110 mmol/L    CO2 23 23 - 29 mmol/L    Glucose 78 70 - 110 mg/dL    BUN 17 6 - 20 mg/dL    Creatinine 0.7 0.5 - 1.4 mg/dL    Calcium 9.8 8.7 - 10.5 mg/dL    Total Protein 6.5 6.0 - 8.4 g/dL    Albumin 3.9 3.5 - 5.2 g/dL    Total Bilirubin 0.5 0.1 - 1.0 mg/dL    Alkaline Phosphatase 81 55 - 135 U/L    AST 11 10 - 40 U/L    ALT 7 (L) 10 - 44 U/L    Anion Gap 8 8 - 16 mmol/L    eGFR >60.0 >60 mL/min/1.73 m^2   CBC with Automated Differential    Collection Time: 03/10/23  4:44 AM   Result Value Ref Range    WBC 5.40 3.90 - 12.70 K/uL    RBC 4.77 4.00 - 5.40 M/uL    Hemoglobin 13.9 12.0 - 16.0 g/dL    Hematocrit 42.7 37.0 - 48.5 %    MCV 90 82 - 98 fL    MCH 29.1 27.0 - 31.0 pg    MCHC 32.6 32.0 - 36.0 g/dL    RDW 11.8 11.5 - 14.5 %    Platelets 229 150 - 450 K/uL    MPV 9.8 9.2 - 12.9 fL    Immature Granulocytes 0.2 0.0 - 0.5 %    Gran # (ANC) 2.8 1.8 - 7.7 K/uL    Immature Grans (Abs) 0.01 0.00 - 0.04 K/uL    Lymph # 2.0 1.0 - 4.8 K/uL    Mono # 0.4 0.3 - 1.0 K/uL    Eos # 0.2 0.0 - 0.5 K/uL    Baso # 0.02 0.00 - 0.20 K/uL    nRBC 0 0 /100 WBC    Gran % 51.1 38.0 - 73.0 %    Lymph % 37.2 18.0 - 48.0 %    Mono % 8.1 4.0 - 15.0 %    Eosinophil % 3.0 0.0 - 8.0 %    Basophil % 0.4 0.0 - 1.9 %    Differential Method Automated    Phosphorus    Collection Time: 03/10/23  4:44 AM   Result Value Ref Range    Phosphorus 4.0 2.7 - 4.5 mg/dL   Magnesium    Collection Time: 03/10/23  4:44 AM   Result Value Ref Range    Magnesium 1.9 1.6  - 2.6 mg/dL     Imaging Results              X-Ray Abdomen AP 1 View (KUB) (Final result)  Result time 03/06/23 15:44:11      Final result by Bienvenido Can MD (03/06/23 15:44:11)                   Impression:      1. Nonobstructive bowel gas pattern.      Electronically signed by: Bienvenido Can MD  Date:    03/06/2023  Time:    15:44               Narrative:    EXAMINATION:  XR ABDOMEN AP 1 VIEW    CLINICAL HISTORY:  Toxic effect of unspecified substance, accidental (unintentional), initial encounter    TECHNIQUE:  AP View(s) of the abdomen was performed.    COMPARISON:  None    FINDINGS:  Single-view abdomen supine.    Air and stool is seen within the large bowel and projected over the rectum.  No focally dilated small bowel loops.  There are no calcifications to convincingly suggest nephrolithiasis or cholelithiasis.  There is levo scoliotic curvature of the spine.  No findings to suggest pneumatosis.                                       X-Ray Forearm Left (Final result)  Result time 03/06/23 15:44:59      Final result by Bienvenido Can MD (03/06/23 15:44:59)                   Impression:      As above      Electronically signed by: Bienvenido Can MD  Date:    03/06/2023  Time:    15:44               Narrative:    EXAMINATION:  XR FOREARM LEFT    CLINICAL HISTORY:  Unspecified open wound of unspecified upper arm, initial encounter    TECHNIQUE:  AP and lateral views of the left forearm were performed.    COMPARISON:  None    FINDINGS:  Two views left forearm.    No acute displaced fracture or dislocation of the forearm.  No radiopaque foreign body.  There is subcutaneous emphysema along the distal ulnar aspect of the forearm, correlation with any laceration in the region.

## 2023-03-10 NOTE — NURSING
Patient's aunt at bedside. She voiced concern to this RN about [facility] as her next destination. She prefers to have a facility that can have visitors. Dr. Spann notified and able to speak with patient's aunt over the phone. Transport canceled for this evening. Pt updated on POC. Will reevaluate tomorrow am.

## 2023-03-10 NOTE — DISCHARGE SUMMARY
[facility] - Telemetry [facility] Medicine  Discharge Summary      Patient Name: Sailaja Carbajal  MRN: [id number]  KIMO: [id number]  Patient Class: IP- Inpatient  Admission Date: 3/6/2023  Hospital Length of Stay: 4 days  Discharge Date and Time:  03/10/2023 10:24 AM  Attending Physician: Darshana Spann MD   Discharging Provider: Darshana Spann MD  Primary Care Provider: Primary Doctor [facility] Medicine Team: [facility] MED  Darshana Spann MD  Primary Care Team: [facility]    HPI:   17 y.o. female with history of bipolar 1, anorexia, depression, presented with complaints of drug overdose. Patient reported that she is prescribed lithium, Ativan, aripiprazole for bipolar 1 and anorexia.  She is only been taking her aripiprazole for the past few weeks to months.  Patient states at about 11:00 a.m. on 03/06/2023 she took 3 bottles of 300 mg lithium tablets and 2 bottles of 10 mg Ativan tablets.  Patient has been depressed recently and admits this was a suicide attempt.  She does not have a history of prior suicide attempts but has significant history of self-harm including self cutting.  She have used a  to cut her forearms. On my interview, patient is tearful and says that she does not want to discuss anything at this moment but is agreeable to talk more tomorrow. She reports feeling depressed.      On arrival to ED, patient was found to be hemodynamically stable.  She was drowsy but easily arousable.  Patient has been maintaining her airway and is on room air.  Initial labs with no leukocytosis.  Creatinine 0.9.  Bicarb 20.  Glucose 91.  TSH 0.436.  Salicylate and Tylenol levels negative.  Lithium level 0.8, 0.7 10.6.  Urine pregnancy test negative.  Urine drug test positive for marijuana metabolite  Xray abd with Nonobstructive bowel gas pattern. EKG with sinus tachycardia.  ED physician discussed with  who recommended step-down admission.  No indication for emergent dialysis  at this time.  Patient was PEC'd.  She had a self-inflicted laceration to left wrist which was repaired in ED. Patient was admitted under care of Hospital Medicine for further evaluation        * No surgery found *      Hospital Course:   17 y.o. female with history of bipolar 1, anorexia, depression, presented with complaints of drug overdose. She took 3 bottles of 300 mg lithium tablets and 2 bottles of 10 mg Ativan tablets.  Patient has been depressed recently and admits this was a suicide attempt. She does not have a history of prior suicide attempts but has significant history of self-harm including self cutting.  She have used a  to cut her forearms.  recommended supportive management with step-down admission. Psych consulted. PEC'ed. Per RN , CEC placed in chart 3/7.     Pt's labs remained stable. Her lithium level on admission was not toxic. Pt is medically cleared. She will be discharge to inpatient psych unit until family find a eating disorder program.       Below are the medical problems that were addressed during this hospitalization;    * Intentional drug overdose  Benzodiazepine and lithium overdose   hemodynamically stable. Maintaining airway.    TSH 0.436.  Salicylate and Tylenol levels negative.    Lithium level 0.8, 0.7, 0.6.    Urine drug test positive for marijuana metabolite   Xray abd with Nonobstructive bowel gas pattern.   EKG with sinus tachycardia.    ED physician discussed with  who recommended step-down admission.  No indication for emergent dialysis at this time.  Patient was PEC'd in ED.     Admitted to stepdown for close monitoring. Vitals remained stable, labs are stable.   Received Aggressive IVF hydration. Encouraged PO hydration   Psych consult. Need inpt psych admits. Pt is medically stable.          Suicide attempt  Hx of bipolar disorder type 1. Reports being depressed. Present with intentional drug OD, No hx of prior suicide attempt but hx of  self inflicting lacerations    PEC'ed in ED. Psych consulted. Pt will need inpt psych admission. Pt is medically cleared.      Forearm laceration, left, initial encounter  Pt used  to injure herself.   4 cm laceration start at the left wrist and extends up. This required 4 sutures performed by the ER on 3/6.   Sutures to be removed 7-10 days post placement.      Anorexia  Electrolytes are stable.   She will be discharge to inpatient psych unit until family find a eating disorder program. Aunt who is a professor at [employer] is working on it.      Bipolar 1 disorder  Holding all home psych meds per psych recommendation.  Inpt psych to decide on what meds to start pts on.     Pt is medically stable for discharge to inpatient psych. Plan was for discharge yesterday, but aunt requested a psych unit that would allow visitors. Psych have contacted [facility] in psych who will have a bed for the pt today. Aunt and pt were notified and they are in agreement.       Physical exam;  Vitals; reviewed  General; no acute distress  HENT; NC/AT.  CV; RRR  Resp; CTA  Skin; 4 sutures placed left wrist, area is healing well with no surrounding erythema           Goals of Care Treatment Preferences:  Code Status: Full Code      Consults:   Consults (From admission, onward)        Status Ordering Provider     Inpatient consult to Registered Dietitian/Nutritionist  Once        Provider:  (Not yet assigned)    Completed MARIBEL, SEEMAL     Inpatient consult to Psychiatry  Once        Provider:  (Not yet assigned)    Completed MARIBEL, SEEMAL          Final Active Diagnoses:    Diagnosis Date Noted POA    PRINCIPAL PROBLEM:  Intentional drug overdose [T50.902A] 03/06/2023 Yes    Suicide attempt [T14.91XA] 03/06/2023 Yes    Forearm laceration, left, initial encounter [S51.812A] 03/09/2023 Yes    Anorexia [R63.0] 03/07/2023 Unknown    Bipolar 1 disorder [F31.9] 03/06/2023 Unknown      Problems Resolved During this  Admission:       Discharged Condition: stable    Disposition: Psychiatric Hospital    Follow Up:    Patient Instructions:   No discharge procedures on file.    Significant Diagnostic Studies: Labs:   BMP:   Recent Labs   Lab 03/09/23  0330 03/10/23  0444   GLU 73 78    140   K 3.6 3.9    109   CO2 23 23   BUN 8 17   CREATININE 0.8 0.7   CALCIUM 9.8 9.8   MG 1.8 1.9   , CMP   Recent Labs   Lab 03/09/23  0330 03/10/23  0444    140   K 3.6 3.9    109   CO2 23 23   GLU 73 78   BUN 8 17   CREATININE 0.8 0.7   CALCIUM 9.8 9.8   PROT 6.0 6.5   ALBUMIN 3.7 3.9   BILITOT 0.7 0.5   ALKPHOS 76 81   AST 15 11   ALT 9* 7*   ANIONGAP 8 8    and CBC   Recent Labs   Lab 03/09/23  0330 03/10/23  0444   WBC 6.75 5.40   HGB 13.2 13.9   HCT 39.0 42.7    229       Pending Diagnostic Studies:     None         Medications:  Reconciled Home Medications:      Medication List      STOP taking these medications    ARIPiprazole 15 MG Tab  Commonly known as: ABILIFY     lithium 300 MG CR tablet  Commonly known as: LITHOBID     LORazepam 0.5 MG tablet  Commonly known as: ATIVAN     propranoloL 40 MG tablet  Commonly known as: INDERAL            Indwelling Lines/Drains at time of discharge:   Lines/Drains/Airways     None                 Time spent on the discharge of patient: <30 minutes         Darshana Spann MD  Department of Hospital Medicine  [facility] - Telemetry Stepdown

## 2023-03-10 NOTE — PLAN OF CARE
[facility] - Telemetry Stepdown  Discharge Final Note    Primary Care Provider: Primary Doctor No    Expected Discharge Date: 3/10/2023    Final Discharge Note (most recent)       Final Note - 03/10/23 1120          Final Note    Assessment Type Final Discharge Note     Anticipated Discharge Disposition Psychiatric Hospital   [facility]       Post-Acute Status    Post-Acute Authorization Placement     Post-Acute Placement Status Set-up Complete/Auth obtained   [facility]    Discharge Delays None known at this time                   Future Appointments   Date Time Provider Department Center   4/6/2023  3:20 PM Miladis Fernández NP [id number] [address] [facility] at [facility], ETA for transport to [facility] is 1:00 PM today. RN notified patient and family at bedside.    Abimbola Franklin, ELDON  [facility]  Ext. [phone]

## 2023-03-11 PROBLEM — F50.019 ANOREXIA NERVOSA, RESTRICTING TYPE: Status: ACTIVE | Noted: 2023-03-11

## 2023-03-11 PROBLEM — F31.4 BIPOLAR 1 DISORDER, DEPRESSED, SEVERE: Status: ACTIVE | Noted: 2023-03-11

## 2023-03-11 PROBLEM — F41.1 GENERALIZED ANXIETY DISORDER: Status: ACTIVE | Noted: 2023-03-11

## 2023-03-11 PROBLEM — S61.512A LACERATION OF LEFT WRIST: Status: ACTIVE | Noted: 2023-03-11

## 2023-03-11 PROBLEM — F12.10 MARIJUANA ABUSE: Status: ACTIVE | Noted: 2023-03-11

## 2023-03-11 PROBLEM — S61.511A LACERATION OF RIGHT WRIST: Status: ACTIVE | Noted: 2023-03-11

## 2023-03-11 PROBLEM — F50.01 ANOREXIA NERVOSA, RESTRICTING TYPE: Status: ACTIVE | Noted: 2023-03-11

## 2023-03-11 PROBLEM — R82.90 ABNORMAL URINALYSIS: Status: ACTIVE | Noted: 2023-03-11

## 2023-11-16 ENCOUNTER — OFFICE VISIT (OUTPATIENT)
Dept: OTOLARYNGOLOGY | Facility: CLINIC | Age: 18
End: 2023-11-16
Payer: COMMERCIAL

## 2023-11-16 VITALS
BODY MASS INDEX: 25.89 KG/M2 | DIASTOLIC BLOOD PRESSURE: 80 MMHG | SYSTOLIC BLOOD PRESSURE: 112 MMHG | HEART RATE: 81 BPM | WEIGHT: 160.38 LBS

## 2023-11-16 DIAGNOSIS — J35.03 CHRONIC TONSILLITIS AND ADENOIDITIS: Primary | Chronic | ICD-10-CM

## 2023-11-16 DIAGNOSIS — J35.3 ADENOTONSILLAR HYPERTROPHY: Chronic | ICD-10-CM

## 2023-11-16 DIAGNOSIS — J03.90 TONSILLITIS: Primary | ICD-10-CM

## 2023-11-16 DIAGNOSIS — R06.5 CHRONIC MOUTH BREATHING: Chronic | ICD-10-CM

## 2023-11-16 PROCEDURE — 1160F RVW MEDS BY RX/DR IN RCRD: CPT | Mod: CPTII,S$GLB,, | Performed by: OTOLARYNGOLOGY

## 2023-11-16 PROCEDURE — 99999 PR PBB SHADOW E&M-EST. PATIENT-LVL III: ICD-10-PCS | Mod: PBBFAC,,, | Performed by: OTOLARYNGOLOGY

## 2023-11-16 PROCEDURE — 3008F PR BODY MASS INDEX (BMI) DOCUMENTED: ICD-10-PCS | Mod: CPTII,S$GLB,, | Performed by: OTOLARYNGOLOGY

## 2023-11-16 PROCEDURE — 1160F PR REVIEW ALL MEDS BY PRESCRIBER/CLIN PHARMACIST DOCUMENTED: ICD-10-PCS | Mod: CPTII,S$GLB,, | Performed by: OTOLARYNGOLOGY

## 2023-11-16 PROCEDURE — 3079F DIAST BP 80-89 MM HG: CPT | Mod: CPTII,S$GLB,, | Performed by: OTOLARYNGOLOGY

## 2023-11-16 PROCEDURE — 99204 OFFICE O/P NEW MOD 45 MIN: CPT | Mod: S$GLB,,, | Performed by: OTOLARYNGOLOGY

## 2023-11-16 PROCEDURE — 3079F PR MOST RECENT DIASTOLIC BLOOD PRESSURE 80-89 MM HG: ICD-10-PCS | Mod: CPTII,S$GLB,, | Performed by: OTOLARYNGOLOGY

## 2023-11-16 PROCEDURE — 1159F MED LIST DOCD IN RCRD: CPT | Mod: CPTII,S$GLB,, | Performed by: OTOLARYNGOLOGY

## 2023-11-16 PROCEDURE — 99204 PR OFFICE/OUTPT VISIT, NEW, LEVL IV, 45-59 MIN: ICD-10-PCS | Mod: S$GLB,,, | Performed by: OTOLARYNGOLOGY

## 2023-11-16 PROCEDURE — 3074F SYST BP LT 130 MM HG: CPT | Mod: CPTII,S$GLB,, | Performed by: OTOLARYNGOLOGY

## 2023-11-16 PROCEDURE — 3008F BODY MASS INDEX DOCD: CPT | Mod: CPTII,S$GLB,, | Performed by: OTOLARYNGOLOGY

## 2023-11-16 PROCEDURE — 1159F PR MEDICATION LIST DOCUMENTED IN MEDICAL RECORD: ICD-10-PCS | Mod: CPTII,S$GLB,, | Performed by: OTOLARYNGOLOGY

## 2023-11-16 PROCEDURE — 99999 PR PBB SHADOW E&M-EST. PATIENT-LVL III: CPT | Mod: PBBFAC,,, | Performed by: OTOLARYNGOLOGY

## 2023-11-16 PROCEDURE — 3074F PR MOST RECENT SYSTOLIC BLOOD PRESSURE < 130 MM HG: ICD-10-PCS | Mod: CPTII,S$GLB,, | Performed by: OTOLARYNGOLOGY

## 2023-11-16 NOTE — H&P (VIEW-ONLY)
Chief Complaint   Patient presents with    Other     Discuss tonsils to be removed         HPI: Sailaja Carbajal is a 18 y.o. female who was self-referred for mouth breathing, adenotonsillar hypertrophy, chronic tonsillitis.     Associated symptoms include nasal blockage, post nasal drip, sinus and nasal congestion, sore throat, and chronic mouth breathing.  She also has chronic feeling of irritation in the throat and mild difficulty swallowing due to tonsillar enlargement.  Onset of symptoms was many years ago, unchanged since that time.   Patient had been scheduled for tonsillectomy and adenoidectomy by another ENT physician but was unable to proceed with surgery due to insurance issues.    The patient has had  3-4 bouts of tonsillitis per year for the last several year(s).                Past Medical History:   Diagnosis Date    Anxiety     Depression      Social History     Socioeconomic History    Marital status: Single   Tobacco Use    Smoking status: Never     Passive exposure: Never    Smokeless tobacco: Never   Substance and Sexual Activity    Alcohol use: Never    Drug use: Never    Sexual activity: Never   Other Topics Concern    Patient feels they ought to cut down on drinking/drug use No    Patient annoyed by others criticizing their drinking/drug use No    Patient has felt bad or guilty about drinking/drug use No    Patient has had a drink/used drugs as an eye opener in the AM No   Social History Narrative    ** Merged History Encounter **         ** Merged History Encounter **          History reviewed. No pertinent surgical history.  Family History   Problem Relation Age of Onset    No Known Problems Father     No Known Problems Mother            Review of Systems  General: negative for chills, fever or weight loss  Psychological: negative for mood changes or depression  Ophthalmic: negative for blurry vision, photophobia or eye pain  ENT: see HPI  Respiratory: no cough, shortness of  breath, or wheezing  Cardiovascular: no chest pain or dyspnea on exertion  Gastrointestinal: no abdominal pain, change in bowel habits, or black/ bloody stools  Musculoskeletal: negative for gait disturbance or muscular weakness  Neurological: no syncope or seizures; no ataxia  Dermatological: negative for pruritis,  rash and jaundice  Hematologic/lymphatic: no easy bruising, no new adenopathy      Physical Exam:    Vitals:    11/16/23 1333   BP: 112/80   Pulse: 81         Constitutional:   She is oriented to person, place, and time. Vital signs are normal. She appears well-developed and well-nourished. She appears alert. She is cooperative. Normal speech.      Head:  Normocephalic and atraumatic. Salivary glands normal.  Facial strength is normal.      Ears:  Hearing normal to normal and whispered voice; external ear normal without scars, lesions, or masses; ear canal, tympanic membrane, and middle ear normal., right ear hearing normal to normal and whispered voice; external ear normal without scars, lesions, or masses; ear canal, tympanic membrane, and middle ear normal. and left ear hearing normal to normal and whispered voice; external ear normal without scars, lesions, or masses; ear canal, tympanic membrane, and middle ear normal..   Right Ear: No middle ear effusion.   Left Ear:  No middle ear effusion.     Nose:  Mucosal edema present. No rhinorrhea, septal deviation or polyps. Turbinate hypertrophy (3+, boggy, congested mucosa).  Turbinates normal.  Right sinus exhibits no maxillary sinus tenderness and no frontal sinus tenderness. Left sinus exhibits no maxillary sinus tenderness and no frontal sinus tenderness.     Mouth/Throat  Oropharynx clear and moist without lesions or asymmetry, lips, teeth, and gums normal and oropharynx normal. No mucous membrane lesions. No oropharyngeal exudate, posterior oropharyngeal edema or posterior oropharyngeal erythema. Tonsils present (cryptic), +3.  Tonsillar erythema.   Mirror exam not performed due to patient tolerance.  Mirror exam not performed due to patient tolerance.      Neck:  Neck normal without thyromegaly masses, asymmetry, normal tracheal structure, crepitus, and tenderness, thyroid normal, trachea normal, phonation normal, full range of motion with neck supple and no adenopathy. No JVD present. Carotid bruit is not present. No thyroid mass and no thyromegaly present.     She has no cervical adenopathy.     Cardiovascular:    Normal rate, regular rhythm and rate and rhythm, heart sounds, and pulses normal.              Pulmonary/Chest:   Effort and breath sounds normal.     Psychiatric:   She has a normal mood and affect. Her speech is normal and behavior is normal.     Neurological:   She is alert and oriented to person, place, and time. She has neurological normal, alert and oriented. No cranial nerve deficit.     Skin:   No abrasions, lacerations, lesions, or rashes.               Assessment:    ICD-10-CM ICD-9-CM    1. Chronic tonsillitis and adenoiditis  J35.03 474.02       2. Chronic mouth breathing  R06.5 784.99       3. Adenotonsillar hypertrophy  J35.3 474.10         The primary encounter diagnosis was Chronic tonsillitis and adenoiditis. Diagnoses of Chronic mouth breathing and Adenotonsillar hypertrophy were also pertinent to this visit.      Plan:    Risks, benefits and alternatives of tonsillectomy and adenoidectomy were discussed, patient and his/her representatives had no further questions or all questions answered, and patient/representative stated understanding.    She will coordinate with my staff on surgical planning.      Liliana Villafana MD

## 2023-12-04 ENCOUNTER — TELEPHONE (OUTPATIENT)
Dept: OTOLARYNGOLOGY | Facility: CLINIC | Age: 18
End: 2023-12-04
Payer: COMMERCIAL

## 2023-12-04 NOTE — TELEPHONE ENCOUNTER
Spoke with patient and informed her that  has nothing as of now in notes stating that medication was given. But I did inform patient that I will be contacting  for more inform.  ----- Message from Pepper Yeung sent at 12/4/2023 12:22 PM CST -----  Type:  Needs Medical Advice    Who Called: Pt    Pharmacy name and phone #:  [id number]  [place] - [address] AT [facility] [address]   Phone: [phone]  Fax: [phone]  Would the patient rather a call back or a response via [facility]? call  Best Call Back Number: [phone]  Additional Information: Pt would like a call from office nurse regarding medication before schedule surgery on 12/11/2023 please call regarding

## 2023-12-11 ENCOUNTER — ANESTHESIA (OUTPATIENT)
Dept: SURGERY | Facility: HOSPITAL | Age: 18
End: 2023-12-11
Payer: COMMERCIAL

## 2023-12-11 ENCOUNTER — ANESTHESIA EVENT (OUTPATIENT)
Dept: SURGERY | Facility: HOSPITAL | Age: 18
End: 2023-12-11
Payer: COMMERCIAL

## 2023-12-11 ENCOUNTER — HOSPITAL ENCOUNTER (OUTPATIENT)
Facility: HOSPITAL | Age: 18
Discharge: HOME | End: 2023-12-11
Attending: OTOLARYNGOLOGY | Admitting: OTOLARYNGOLOGY
Payer: COMMERCIAL

## 2023-12-11 VITALS
HEIGHT: 66 IN | WEIGHT: 150 LBS | OXYGEN SATURATION: 98 % | SYSTOLIC BLOOD PRESSURE: 125 MMHG | HEART RATE: 77 BPM | DIASTOLIC BLOOD PRESSURE: 73 MMHG | BODY MASS INDEX: 24.11 KG/M2 | RESPIRATION RATE: 18 BRPM | TEMPERATURE: 98 F

## 2023-12-11 DIAGNOSIS — J35.03 CHRONIC TONSILLITIS AND ADENOIDITIS: Primary | ICD-10-CM

## 2023-12-11 LAB
B-HCG UR QL: NEGATIVE
CTP QC/QA: YES

## 2023-12-11 PROCEDURE — 71000016 HC POSTOP RECOV ADDL HR: Performed by: OTOLARYNGOLOGY

## 2023-12-11 PROCEDURE — 71000015 HC POSTOP RECOV 1ST HR: Performed by: OTOLARYNGOLOGY

## 2023-12-11 PROCEDURE — 63600175 PHARM REV CODE 636 W HCPCS: Performed by: OTOLARYNGOLOGY

## 2023-12-11 PROCEDURE — D9220A PRA ANESTHESIA: ICD-10-PCS | Mod: ANES,,, | Performed by: ANESTHESIOLOGY

## 2023-12-11 PROCEDURE — 37000009 HC ANESTHESIA EA ADD 15 MINS: Performed by: OTOLARYNGOLOGY

## 2023-12-11 PROCEDURE — 25000003 PHARM REV CODE 250: Performed by: OTOLARYNGOLOGY

## 2023-12-11 PROCEDURE — 63600175 PHARM REV CODE 636 W HCPCS: Performed by: ANESTHESIOLOGY

## 2023-12-11 PROCEDURE — 25000003 PHARM REV CODE 250: Performed by: NURSE ANESTHETIST, CERTIFIED REGISTERED

## 2023-12-11 PROCEDURE — 36000706: Performed by: OTOLARYNGOLOGY

## 2023-12-11 PROCEDURE — 88304 PR  SURG PATH,LEVEL III: ICD-10-PCS | Mod: 26,,, | Performed by: PATHOLOGY

## 2023-12-11 PROCEDURE — D9220A PRA ANESTHESIA: Mod: CRNA,,, | Performed by: NURSE ANESTHETIST, CERTIFIED REGISTERED

## 2023-12-11 PROCEDURE — 71000033 HC RECOVERY, INTIAL HOUR: Performed by: OTOLARYNGOLOGY

## 2023-12-11 PROCEDURE — D9220A PRA ANESTHESIA: ICD-10-PCS | Mod: CRNA,,, | Performed by: NURSE ANESTHETIST, CERTIFIED REGISTERED

## 2023-12-11 PROCEDURE — D9220A PRA ANESTHESIA: Mod: ANES,,, | Performed by: ANESTHESIOLOGY

## 2023-12-11 PROCEDURE — 63600175 PHARM REV CODE 636 W HCPCS: Performed by: NURSE ANESTHETIST, CERTIFIED REGISTERED

## 2023-12-11 PROCEDURE — 42821 REMOVE TONSILS AND ADENOIDS: CPT | Mod: ,,, | Performed by: OTOLARYNGOLOGY

## 2023-12-11 PROCEDURE — 36000707: Performed by: OTOLARYNGOLOGY

## 2023-12-11 PROCEDURE — 37000008 HC ANESTHESIA 1ST 15 MINUTES: Performed by: OTOLARYNGOLOGY

## 2023-12-11 PROCEDURE — 42821 PR REMOVE TONSILS/ADENOIDS,12+ Y/O: ICD-10-PCS | Mod: ,,, | Performed by: OTOLARYNGOLOGY

## 2023-12-11 PROCEDURE — 88304 TISSUE EXAM BY PATHOLOGIST: CPT | Mod: 59 | Performed by: PATHOLOGY

## 2023-12-11 PROCEDURE — 88304 TISSUE EXAM BY PATHOLOGIST: CPT | Mod: 26,,, | Performed by: PATHOLOGY

## 2023-12-11 RX ORDER — FENTANYL CITRATE 50 UG/ML
INJECTION, SOLUTION INTRAMUSCULAR; INTRAVENOUS
Status: DISCONTINUED | OUTPATIENT
Start: 2023-12-11 | End: 2023-12-11

## 2023-12-11 RX ORDER — LIDOCAINE HYDROCHLORIDE 10 MG/ML
INJECTION, SOLUTION INTRAVENOUS
Status: DISCONTINUED | OUTPATIENT
Start: 2023-12-11 | End: 2023-12-11

## 2023-12-11 RX ORDER — HYDROCODONE BITARTRATE AND ACETAMINOPHEN 7.5; 325 MG/15ML; MG/15ML
15 SOLUTION ORAL EVERY 4 HOURS PRN
Status: SHIPPED | OUTPATIENT
Start: 2023-12-11

## 2023-12-11 RX ORDER — SODIUM CHLORIDE 0.9 % (FLUSH) 0.9 %
10 SYRINGE (ML) INJECTION
Status: DISCONTINUED | OUTPATIENT
Start: 2023-12-11 | End: 2023-12-11 | Stop reason: HOSPADM

## 2023-12-11 RX ORDER — PROCHLORPERAZINE EDISYLATE 5 MG/ML
5 INJECTION INTRAMUSCULAR; INTRAVENOUS EVERY 30 MIN PRN
Status: COMPLETED | OUTPATIENT
Start: 2023-12-11 | End: 2023-12-11

## 2023-12-11 RX ORDER — DEXAMETHASONE SODIUM PHOSPHATE 4 MG/ML
INJECTION, SOLUTION INTRA-ARTICULAR; INTRALESIONAL; INTRAMUSCULAR; INTRAVENOUS; SOFT TISSUE
Status: DISCONTINUED | OUTPATIENT
Start: 2023-12-11 | End: 2023-12-11

## 2023-12-11 RX ORDER — HYDROMORPHONE HYDROCHLORIDE 2 MG/ML
0.2 INJECTION, SOLUTION INTRAMUSCULAR; INTRAVENOUS; SUBCUTANEOUS EVERY 5 MIN PRN
Status: DISCONTINUED | OUTPATIENT
Start: 2023-12-11 | End: 2023-12-11 | Stop reason: HOSPADM

## 2023-12-11 RX ORDER — ONDANSETRON 8 MG/1
8 TABLET, ORALLY DISINTEGRATING ORAL EVERY 8 HOURS PRN
Status: DISCONTINUED | OUTPATIENT
Start: 2023-12-11 | End: 2023-12-11 | Stop reason: HOSPADM

## 2023-12-11 RX ORDER — OXYMETAZOLINE HCL 0.05 %
SPRAY, NON-AEROSOL (ML) NASAL
Status: DISCONTINUED | OUTPATIENT
Start: 2023-12-11 | End: 2023-12-11 | Stop reason: HOSPADM

## 2023-12-11 RX ORDER — DEXMEDETOMIDINE HYDROCHLORIDE 100 UG/ML
INJECTION, SOLUTION INTRAVENOUS
Status: DISCONTINUED | OUTPATIENT
Start: 2023-12-11 | End: 2023-12-11

## 2023-12-11 RX ORDER — DEXAMETHASONE SODIUM PHOSPHATE 4 MG/ML
12 INJECTION, SOLUTION INTRA-ARTICULAR; INTRALESIONAL; INTRAMUSCULAR; INTRAVENOUS; SOFT TISSUE
Status: DISCONTINUED | OUTPATIENT
Start: 2023-12-11 | End: 2023-12-11 | Stop reason: HOSPADM

## 2023-12-11 RX ORDER — ONDANSETRON 4 MG/1
8 TABLET, ORALLY DISINTEGRATING ORAL EVERY 8 HOURS PRN
Qty: 10 TABLET | Refills: 0 | Status: SHIPPED | OUTPATIENT
Start: 2023-12-11

## 2023-12-11 RX ORDER — LIDOCAINE HYDROCHLORIDE 20 MG/ML
JELLY TOPICAL
Status: DISCONTINUED | OUTPATIENT
Start: 2023-12-11 | End: 2023-12-11 | Stop reason: HOSPADM

## 2023-12-11 RX ORDER — MIDAZOLAM HYDROCHLORIDE 1 MG/ML
INJECTION, SOLUTION INTRAMUSCULAR; INTRAVENOUS
Status: DISCONTINUED | OUTPATIENT
Start: 2023-12-11 | End: 2023-12-11

## 2023-12-11 RX ORDER — ONDANSETRON 2 MG/ML
INJECTION INTRAMUSCULAR; INTRAVENOUS
Status: DISCONTINUED | OUTPATIENT
Start: 2023-12-11 | End: 2023-12-11

## 2023-12-11 RX ORDER — OXYCODONE HYDROCHLORIDE 5 MG/1
5 TABLET ORAL
Status: DISCONTINUED | OUTPATIENT
Start: 2023-12-11 | End: 2023-12-11 | Stop reason: HOSPADM

## 2023-12-11 RX ORDER — ROCURONIUM BROMIDE 10 MG/ML
INJECTION, SOLUTION INTRAVENOUS
Status: DISCONTINUED | OUTPATIENT
Start: 2023-12-11 | End: 2023-12-11

## 2023-12-11 RX ORDER — BUPIVACAINE HYDROCHLORIDE 2.5 MG/ML
INJECTION, SOLUTION INFILTRATION; PERINEURAL
Status: DISCONTINUED | OUTPATIENT
Start: 2023-12-11 | End: 2023-12-11 | Stop reason: HOSPADM

## 2023-12-11 RX ORDER — OXYCODONE HCL 5 MG/5 ML
5 SOLUTION, ORAL ORAL EVERY 4 HOURS PRN
Qty: 210 ML | Refills: 0 | Status: SHIPPED | OUTPATIENT
Start: 2023-12-11 | End: 2023-12-18

## 2023-12-11 RX ORDER — AMOXICILLIN 400 MG/5ML
400 POWDER, FOR SUSPENSION ORAL EVERY 12 HOURS
Qty: 75 ML | Refills: 0 | Status: SHIPPED | OUTPATIENT
Start: 2023-12-11 | End: 2023-12-18

## 2023-12-11 RX ORDER — PROPOFOL 10 MG/ML
VIAL (ML) INTRAVENOUS
Status: DISCONTINUED | OUTPATIENT
Start: 2023-12-11 | End: 2023-12-11

## 2023-12-11 RX ADMIN — LIDOCAINE HYDROCHLORIDE 50 MG: 10 INJECTION, SOLUTION INTRAVENOUS at 08:12

## 2023-12-11 RX ADMIN — DEXAMETHASONE SODIUM PHOSPHATE 12 MG: 4 INJECTION, SOLUTION INTRA-ARTICULAR; INTRALESIONAL; INTRAMUSCULAR; INTRAVENOUS; SOFT TISSUE at 08:12

## 2023-12-11 RX ADMIN — PROCHLORPERAZINE EDISYLATE 5 MG: 5 INJECTION INTRAMUSCULAR; INTRAVENOUS at 10:12

## 2023-12-11 RX ADMIN — MIDAZOLAM 5 MG: 1 INJECTION INTRAMUSCULAR; INTRAVENOUS at 08:12

## 2023-12-11 RX ADMIN — DEXMEDETOMIDINE HCL 4 MCG: 100 INJECTION INTRAVENOUS at 08:12

## 2023-12-11 RX ADMIN — ROCURONIUM BROMIDE 50 MG: 10 INJECTION, SOLUTION INTRAVENOUS at 08:12

## 2023-12-11 RX ADMIN — DEXMEDETOMIDINE HCL 8 MCG: 100 INJECTION INTRAVENOUS at 08:12

## 2023-12-11 RX ADMIN — SUGAMMADEX 200 MG: 100 INJECTION, SOLUTION INTRAVENOUS at 09:12

## 2023-12-11 RX ADMIN — SODIUM CHLORIDE: 0.9 INJECTION, SOLUTION INTRAVENOUS at 08:12

## 2023-12-11 RX ADMIN — ONDANSETRON 8 MG: 8 TABLET, ORALLY DISINTEGRATING ORAL at 12:12

## 2023-12-11 RX ADMIN — FENTANYL CITRATE 100 MCG: 50 INJECTION, SOLUTION INTRAMUSCULAR; INTRAVENOUS at 08:12

## 2023-12-11 RX ADMIN — FENTANYL CITRATE 25 MCG: 50 INJECTION, SOLUTION INTRAMUSCULAR; INTRAVENOUS at 08:12

## 2023-12-11 RX ADMIN — PROPOFOL 200 MG: 10 INJECTION, EMULSION INTRAVENOUS at 08:12

## 2023-12-11 RX ADMIN — ONDANSETRON 4 MG: 2 INJECTION, SOLUTION INTRAMUSCULAR; INTRAVENOUS at 08:12

## 2023-12-11 NOTE — TRANSFER OF CARE
"Anesthesia Transfer of Care Note    Patient: Sailaja Carbajal    Procedure(s) Performed: Procedure(s) (LRB):  TONSILLECTOMY AND ADENOIDECTOMY (Bilateral)    Patient location: PACU    Transport from OR: Transported from OR on 6-10 L/min O2 by face mask with adequate spontaneous ventilation    Post pain: adequate analgesia    Post assessment: no apparent anesthetic complications    Post vital signs: stable    Level of consciousness: sedated    Nausea/Vomiting: no nausea/vomiting    Complications: none    Transfer of care protocol was followed    Last vitals: Visit Vitals  /69   Pulse 73   Temp 36.7 °C (98.1 °F) (Temporal)   Resp 18   Ht 5' 6" (1.676 m)   Wt 68 kg (150 lb)   LMP 12/11/2023   SpO2 97%   Breastfeeding No   BMI 24.21 kg/m²     "

## 2023-12-11 NOTE — ANESTHESIA PROCEDURE NOTES
Intubation    Date/Time: 12/11/2023 8:11 AM    Performed by: Messi Gillette CRNA  Authorized by: Christel Ann MD    Intubation:     Intubated:  Postinduction    Mask Ventilation:  Easy with oral airway    Attempts:  1    Attempted By:  CRNA    Method of Intubation:  Direct    Blade:  Sierra 2    Laryngeal View Grade: Grade I - full view of cords      Difficult Airway Encountered?: No      Complications:  None    Airway Device:  Oral angela    Airway Device Size:  7.0    Style/Cuff Inflation:  Cuffed    Inflation Amount (mL):  6    Tube secured:  21    Secured at:  The lips    Placement Verified By:  Capnometry and Colorimetric ETCO2 device    Complicating Factors:  None    Findings Post-Intubation:  BS equal bilateral

## 2023-12-11 NOTE — OP NOTE
TONSILLECTOMY AND ADENOIDECTOMY  Procedure Note    Sailaja Carbajal  12/11/2023    Surgeon:  Dr. Liliana Villafana  Assistant:  None    Preoperative diagnosis:  adenotonsillar hypertrophy, recurrent tonsillitis, tonsil stones    Postoperative diagnosis:  Same    Procedure:  TONSILLECTOMY AND ADENOIDECTOMY    Findings:   1.  3+ tonsils bilaterally    2.  Enlarged adenoids, 50% obstructing of the bilateral nasal choanae     Anesthesia:  General endotracheal anesthesia    Blood loss:  15mL    Specimen:  Tonsils    Medications administered in the OR:  Decadron 12 mg IV    Implants:  None    Indications for procedure:  Patient presented to clinic with complaints of sore throats, purulent debris in throat.  Risks and benefits of the procedure were extensively discussed with the patient, and they elected to proceed with the procedure.    Procedure in Detail:  After appropriate consents were obtained, the patient was taken to the operating room and placed in a supine position.  Anesthesia then obtained intravenous access and placed the patient under general endotracheal anesthesia.  The head of the bed was rotated 90 degrees, and a small shoulder roll was placed.  A Latanya-Gamal mouth retractor was then placed in the patient's oral cavity and suspended from a pineda stand.  The soft palate was examined, and it was found to be of adequate length and the uvula had a normal contour.  A red rubber catheter was passed through a nostril and held in place with a gauze and hemostat to elevate the soft palate.     The right tonsil was grasped using an XAPPmedia forcep, and the needle tip bovie cautery on a setting of 15 was used to remove the tonsil in a superior to inferior fashion, maintaining hemostasis throughout with the Bovie cautery..  The needle tip and suction bovie tip were then used to achieve adequate hemostasis.  The left tonsil was then removed in a similar fashion.     0.25% bupivicane then injected superficially into  the tonsillar fossa bilaterally, using approximately 5 cc total.  Any small areas of bleeding were controlled with the bovie suction cautery on a setting of 25.     A mirror was then used to examine the adenoid pad, and a Bovie suction cautery was used to perform cautery adenoidectomy.  The adenoids were then removed in a superior to inferior fashion, leaving a small ridge of tissue inferiorly to prevent velopharyngeal insufficiency.  Adequate hemostasis was then obtained using a suction bovie.     Two figure of eight sutures were placed, one each in the inferior pole of each tonsillar fossa, to help close the fossa and aid in hemostasis.     The patient's oral cavity and nasal cavity were then irrigated with normal saline, and a flexible suction catheter was passed to the patient's stomach to evacuate gastric contents.       The mouth retractor was then removed, and the patient's teeth, gums, and lips were all examined and were found to be free of any trauma. A small amount of topical lidocaine jelly was placed in the tonsillar fossae bilaterally for additional pain control on awakening.       The patient's care was then returned to anesthesia, and the patient was awakened and extubated without difficulty, and brought to the recovery room in good condition.     Liliana Villafana MD  [facility]

## 2023-12-11 NOTE — INTERVAL H&P NOTE
The patient has been examined and the H&P has been reviewed:    I concur with the findings and no changes have occurred since H&P was written.    Surgery risks, benefits and alternative options discussed and understood by patient/family.          Active Hospital Problems    Diagnosis  POA    Chronic tonsillitis and adenoiditis [J35.03]  Yes      Resolved Hospital Problems   No resolved problems to display.     Liliana Villafana MD  [facility]    This note was created by combination of typed  and MModal dictation.  Transcription errors may be present.  If there are any questions, please contact me.

## 2023-12-11 NOTE — DISCHARGE INSTRUCTIONS
Tonsillectomy Post Procedure Instructions    Soft diet only for 2 weeks as previously discussed.  Avoid hot or acidic food and drink- no red, purple, or dark brown drinks.  Drink plenty of fluids- avoid sugary or caffeinated drinks.    Breath may be bad for a couple of weeks. This is normal.   For constipation, Milk of magnesia or Miralax may be used.   Use prescription pain medication as needed for the first 1 week.  If pain is not severe, use regular OTC acetaminophen.  After the first 3 days, if additional pain relief is needed, you may use OTC ibuprofen 400mg twice daily.  Call Dr. Villafana's office [phone] in case of bleeding from the mouth or other postoperative concerns (high fever not relieved with tylenol, severe pain uncontrolled by medications).  For bleeding issues, if after hours, go to the nearest emergency room, call the emergence nurse line, and drink ice water.   ANESTHESIA  -For the first 24 hours after surgery:  Do not drive, use heavy equipment, make important decisions, or drink alcohol  -It is normal to feel sleepy for several hours.  Rest until you are more awake.  -Have someone stay with you, if needed.  They can watch for problems and help keep you safe.  -Some possible post anesthesia side effects include: nausea and vomiting, sore throat and hoarseness, sleepiness, and dizziness.    PAIN  -If you have pain after surgery, pain medicine will help you feel better.  Take it as directed, before pain becomes severe.  Most pain relievers taken by mouth need at least 20-30 minutes to start working.  -Do not drive or drink alcohol while taking pain medicine.  -Pain medication can upset your stomach.  Taking them with a little food may help.  -Other ways to help control pain: elevation, ice, and relaxation  -Call your surgeon if still having unmanageable pain an hour after taking pain medicine.  -Pain medicine can cause constipation.  Taking an over-the counter stool softener while on prescription  pain medicine and drinking plenty of fluids can prevent this side effect.  -Call your surgeon if you have severe side effects like: breathing problems, trouble waking up, dizziness, confusion, or severe constipation.    NAUSEA  -Some people have nausea after surgery.  This is often because of anesthesia, pain, pain medicine, or the stress of surgery.  -Do not push yourself to eat.  Start off with clear liquids and soup.  Slowly move to solid foods.  Don't eat fatty, rich, spicy foods at first.  Eat smaller amounts.  -If you develop persistent nausea and vomiting please notify your surgeon immediately.    BLEEDING  -Different types of surgery require different types of care and dressing changes.  It is important to follow all instructions and advice from your surgeon.  Change dressing as directed.  Call your surgeon for any concerns regarding postop bleeding.    SIGNS OF INFECTION  -Signs of infection include: fever, swelling, drainage, and redness  -Notify your surgeon if you have a fever of 100.4 F (38.0 C) or higher.  -Notify your surgeon if you notice redness, swelling, increased pain, pus, or a foul smell at the incision site.

## 2023-12-11 NOTE — BRIEF OP NOTE
[facility]  Brief Operative Note     SUMMARY     Surgery Date: 12/11/2023     Surgeon(s) and Role:     * Juan White MD - Primary    Assisting Surgeon: None    Pre-op Diagnosis:  Tonsillitis [J03.90]    Post-op Diagnosis:  Post-Op Diagnosis Codes:     * Tonsillitis [J03.90]    Procedure(s) (LRB):  TONSILLECTOMY AND ADENOIDECTOMY (Bilateral)    Anesthesia: General    Findings/Key Components:  adenoid and tonsillar hypertrophy with debris and scarring from chronic tonsillitis    Estimated Blood Loss: 15ml         Specimens:   Specimen (24h ago, onward)       Start     Ordered    12/11/23 0751  Specimen to Pathology, Surgery ENT  Once        Comments: Pre-op Diagnosis: Tonsillitis [J03.90]Procedure(s):TONSILLECTOMY AND ADENOIDECTOMY Number of specimens: 2Name of specimens: 1 right tonsil - perm2 left tonsil - perm     References:    Click here for ordering Quick Tip   Question Answer Comment   Procedure Type: ENT    Which provider would you like to cc? JUAN WHITE    Release to patient Immediate        12/11/23 0841                    Discharge Note    SUMMARY     Admit Date: 12/11/2023    Discharge Date and Time: 12/11/2023    Attending Physician: Juan White MD     Discharge Provider: Juan White    Final Diagnosis: Post-Op Diagnosis Codes:     * Tonsillitis [J03.90]    Disposition: Home or Self Care    Follow Up/Patient Instructions: see orders    Medications:  Reconciled Home Medications:   Current Discharge Medication List        START taking these medications    Details   amoxicillin (AMOXIL) 400 mg/5 mL suspension Take 5 mLs (400 mg total) by mouth every 12 (twelve) hours. for 7 days  Qty: 70 mL, Refills: 0      ondansetron (ZOFRAN-ODT) 4 MG TbDL Take 2 tablets (8 mg total) by mouth every 8 (eight) hours as needed (nausea).  Qty: 10 tablet, Refills: 0      oxyCODONE (ROXICODONE) 5 mg/5 mL Soln Take 5 mLs (5 mg total) by mouth every 4 (four) hours as needed (pain uncontrolled with OTC  medications).  Qty: 220 mL, Refills: 0    Comments: Quantity prescribed more than 7 day supply? No           CONTINUE these medications which have NOT CHANGED    Details   FLUoxetine 20 MG capsule Take 1 capsule (20 mg total) by mouth once daily.  Qty: 30 capsule, Refills: 0           Discharge Procedure Orders   Diet general   Order Comments: Soft diet x 10 days     Other restrictions (specify):   Order Comments: No strenuous activity for two weeks     Call MD for:  temperature >100.4     Call MD for:  severe uncontrolled pain     Call MD for:   Order Comments: Persistent bleeding from mouth     Call MD for:  persistent nausea and vomiting       Liliana Villafana MD  [facility]    This note was created by combination of typed  and MModal dictation.  Transcription errors may be present.  If there are any questions, please contact me.

## 2023-12-11 NOTE — DISCHARGE SUMMARY
[facility] (Hospital)  Discharge Note  Short Stay    Procedure(s) (LRB):  TONSILLECTOMY AND ADENOIDECTOMY (Bilateral)      OUTCOME: Patient tolerated treatment/procedure well without complication and is now ready for discharge.    DISPOSITION: Home or Self Care    FINAL DIAGNOSIS:  chronic tonsillitis, tonsillary hypertrophy, recurrent tonsil stones    FOLLOWUP: In clinic    DISCHARGE INSTRUCTIONS:    Discharge Procedure Orders   Diet general   Order Comments: Soft diet x 10 days     Other restrictions (specify):   Order Comments: No strenuous activity for two weeks     Call MD for:  temperature >100.4     Call MD for:  severe uncontrolled pain     Call MD for:   Order Comments: Persistent bleeding from mouth     Call MD for:  persistent nausea and vomiting        TIME SPENT ON DISCHARGE: 15 minutes    Liliana Villafana MD  [facility]    This note was created by combination of typed  and MModal dictation.  Transcription errors may be present.  If there are any questions, please contact me.

## 2023-12-11 NOTE — ANESTHESIA PREPROCEDURE EVALUATION
12/11/2023  Sailaja Carbajal is a 18 y.o., female here for T&A      Pre-op Assessment    I have reviewed the Patient Summary Reports.    I have reviewed the NPO Status.   I have reviewed the Medications.     Review of Systems  Anesthesia Hx:  No problems with previous Anesthesia             Denies Family Hx of Anesthesia complications.    Denies Personal Hx of Anesthesia complications.                    Social:  Non-Smoker, No Alcohol Use       Hematology/Oncology:  Hematology Normal   Oncology Normal                                   EENT/Dental:  EENT/Dental Normal           Cardiovascular:  Cardiovascular Normal                                            Renal/:  Renal/ Normal                 Hepatic/GI:  Hepatic/GI Normal                 Musculoskeletal:  Musculoskeletal Normal                OB/GYN/PEDS:  -UPT at bedside           Neurological:  Neurology Normal                                      Endocrine:  Endocrine Normal            Psych:  Psychiatric History anxiety depression Suicidal ideation and intentional overdose in 3/23               Physical Exam  General: Well nourished, Cooperative, Alert and Oriented    Airway:  Mallampati: II   Mouth Opening: Normal  TM Distance: Normal  Tongue: Normal  Neck ROM: Normal ROM    Dental:  Intact        Anesthesia Plan  Type of Anesthesia, risks & benefits discussed:    Anesthesia Type: Gen ETT  Intra-op Monitoring Plan: Standard ASA Monitors  Post Op Pain Control Plan: multimodal analgesia  Induction:  IV  Airway Plan: Video and Direct, Post-Induction  Informed Consent: Informed consent signed with the Patient and all parties understand the risks and agree with anesthesia plan.  All questions answered.   ASA Score: 2    Ready For Surgery From Anesthesia Perspective.     .

## 2023-12-11 NOTE — ANESTHESIA POSTPROCEDURE EVALUATION
Anesthesia Post Evaluation    Patient: Sailaja Carbajal    Procedure(s) Performed: Procedure(s) (LRB):  TONSILLECTOMY AND ADENOIDECTOMY (Bilateral)    Final Anesthesia Type: general      Patient location during evaluation: PACU  Patient participation: Yes- Able to Participate  Level of consciousness: awake and alert  Post-procedure vital signs: reviewed and stable  Pain management: adequate  Airway patency: patent    PONV status at discharge: No PONV  Anesthetic complications: no      Cardiovascular status: blood pressure returned to baseline  Respiratory status: unassisted  Hydration status: euvolemic                Vitals Value Taken Time   /78 12/11/23 1100   Temp 36.5 °C (97.7 °F) 12/11/23 1030   Pulse 81 12/11/23 1100   Resp 18 12/11/23 1100   SpO2 97 % 12/11/23 1100         Event Time   Out of Recovery 10:33:00         Pain/Tex Score: Tex Score: 10 (12/11/2023 11:00 AM)

## 2023-12-13 LAB
FINAL PATHOLOGIC DIAGNOSIS: NORMAL
GROSS: NORMAL
Lab: NORMAL

## 2023-12-14 NOTE — PROGRESS NOTES
Pathology results for tonsil surgical specimen reviewed.    Shows chronic tonsillitis.    Liliana Villafana MD  [facility]

## 2023-12-15 ENCOUNTER — TELEPHONE (OUTPATIENT)
Dept: OTOLARYNGOLOGY | Facility: CLINIC | Age: 18
End: 2023-12-15
Payer: COMMERCIAL

## 2023-12-15 NOTE — TELEPHONE ENCOUNTER
----- Message from Liliana Villafana MD sent at 12/14/2023  4:21 PM CST -----  Pathology results for tonsil surgical specimen reviewed.    Shows chronic tonsillitis.    Liliana Villafana MD  [facility]

## 2023-12-18 NOTE — PROGRESS NOTES
Subjective:    Here to followup after tonsillectomy and adenoidectomy    Patient ID: Sailaja Carbajal is a 18 y.o. female.    Chief Complaint:  Recent tonsillectomy and adenoidectomy     Sailaja Carbajal is a 18 y.o. female here to see me today after a recent tonsillectomy and adenoidectomy.   Following surgery, she is doing quite well at this time.  she experienced pain for 5 days, but is no longer requiring any significant amount of oral pain medicine.  she has resumed a regular diet and all normal activities.  she did not experience any postoperative bleeding or other complications.  she has no specific questions or concerns today.    Review of Systems   Constitutional: Negative for fever and fatigue.   HENT: Negative for ear pain, mouth sores, sore throat, trouble swallowing and voice change.    Respiratory: Negative for cough.    Cardiovascular: Negative for chest pain.   Neurological: Negative for headaches.       Objective:     Physical Exam   Constitutional: well-developed and well-nourished.   HENT:   Head: Normocephalic.   Right Ear: Tympanic membrane, external ear and ear canal normal. Tympanic membrane is not erythematous.   Left Ear: Tympanic membrane, external ear and ear canal normal. Tympanic membrane is not erythematous.   Nose: Nose normal. No rhinorrhea.   Mouth/Throat: Uvula is midline and oropharynx is clear and moist. No trismus in the jaw. Normal dentition. No uvula swelling.   Status post tonsillectomy, tonsillar fossae healing well   Lymphadenopathy:     no cervical adenopathy.       Assessment:     1. Chronic tonsillitis and adenoiditis    2. Chronic mouth breathing    3. Adenotonsillar hypertrophy        Plan:     Continue to wean pain medication.  Add ibuprofen b.i.d..    Continue hydration and soft diet for the next few days.    Return to work/normal activities as able.      Follow-up in a few weeks for final postop check.    Emily L. Burke, MD [facility]    This note was created by combination of typed  and MModal dictation.  Transcription errors may be present.  If there are any questions, please contact me.

## 2023-12-19 ENCOUNTER — OFFICE VISIT (OUTPATIENT)
Dept: OTOLARYNGOLOGY | Facility: CLINIC | Age: 18
End: 2023-12-19
Payer: COMMERCIAL

## 2023-12-19 VITALS
DIASTOLIC BLOOD PRESSURE: 79 MMHG | SYSTOLIC BLOOD PRESSURE: 119 MMHG | HEART RATE: 71 BPM | WEIGHT: 157.75 LBS | BODY MASS INDEX: 25.46 KG/M2

## 2023-12-19 DIAGNOSIS — J35.03 CHRONIC TONSILLITIS AND ADENOIDITIS: Primary | Chronic | ICD-10-CM

## 2023-12-19 DIAGNOSIS — J35.3 ADENOTONSILLAR HYPERTROPHY: ICD-10-CM

## 2023-12-19 DIAGNOSIS — R06.5 CHRONIC MOUTH BREATHING: ICD-10-CM

## 2023-12-19 PROCEDURE — 3074F PR MOST RECENT SYSTOLIC BLOOD PRESSURE < 130 MM HG: ICD-10-PCS | Mod: CPTII,S$GLB,, | Performed by: OTOLARYNGOLOGY

## 2023-12-19 PROCEDURE — 3078F PR MOST RECENT DIASTOLIC BLOOD PRESSURE < 80 MM HG: ICD-10-PCS | Mod: CPTII,S$GLB,, | Performed by: OTOLARYNGOLOGY

## 2023-12-19 PROCEDURE — 99999 PR PBB SHADOW E&M-EST. PATIENT-LVL III: ICD-10-PCS | Mod: PBBFAC,,, | Performed by: OTOLARYNGOLOGY

## 2023-12-19 PROCEDURE — 99024 PR POST-OP FOLLOW-UP VISIT: ICD-10-PCS | Mod: S$GLB,,, | Performed by: OTOLARYNGOLOGY

## 2023-12-19 PROCEDURE — 3074F SYST BP LT 130 MM HG: CPT | Mod: CPTII,S$GLB,, | Performed by: OTOLARYNGOLOGY

## 2023-12-19 PROCEDURE — 3008F BODY MASS INDEX DOCD: CPT | Mod: CPTII,S$GLB,, | Performed by: OTOLARYNGOLOGY

## 2023-12-19 PROCEDURE — 3078F DIAST BP <80 MM HG: CPT | Mod: CPTII,S$GLB,, | Performed by: OTOLARYNGOLOGY

## 2023-12-19 PROCEDURE — 1159F PR MEDICATION LIST DOCUMENTED IN MEDICAL RECORD: ICD-10-PCS | Mod: CPTII,S$GLB,, | Performed by: OTOLARYNGOLOGY

## 2023-12-19 PROCEDURE — 99999 PR PBB SHADOW E&M-EST. PATIENT-LVL III: CPT | Mod: PBBFAC,,, | Performed by: OTOLARYNGOLOGY

## 2023-12-19 PROCEDURE — 3008F PR BODY MASS INDEX (BMI) DOCUMENTED: ICD-10-PCS | Mod: CPTII,S$GLB,, | Performed by: OTOLARYNGOLOGY

## 2023-12-19 PROCEDURE — 99024 POSTOP FOLLOW-UP VISIT: CPT | Mod: S$GLB,,, | Performed by: OTOLARYNGOLOGY

## 2023-12-19 PROCEDURE — 1159F MED LIST DOCD IN RCRD: CPT | Mod: CPTII,S$GLB,, | Performed by: OTOLARYNGOLOGY

## 2023-12-19 NOTE — PATIENT INSTRUCTIONS
Ibuprofen OTC up to 400mg twice a day.    Wean pain meds as able.   Keep hydrating and advance diet as able.      Follow up in a few weeks for final post op check.

## 2024-03-05 ENCOUNTER — OFFICE VISIT (OUTPATIENT)
Dept: OTOLARYNGOLOGY | Facility: CLINIC | Age: 19
End: 2024-03-05
Payer: COMMERCIAL

## 2024-03-05 VITALS
WEIGHT: 154.13 LBS | HEART RATE: 80 BPM | SYSTOLIC BLOOD PRESSURE: 106 MMHG | BODY MASS INDEX: 24.87 KG/M2 | DIASTOLIC BLOOD PRESSURE: 74 MMHG

## 2024-03-05 DIAGNOSIS — J01.00 ACUTE NON-RECURRENT MAXILLARY SINUSITIS: Primary | ICD-10-CM

## 2024-03-05 DIAGNOSIS — Z90.89 S/P TONSILLECTOMY: ICD-10-CM

## 2024-03-05 DIAGNOSIS — J30.9 ALLERGIC RHINITIS, UNSPECIFIED SEASONALITY, UNSPECIFIED TRIGGER: Chronic | ICD-10-CM

## 2024-03-05 PROCEDURE — 99214 OFFICE O/P EST MOD 30 MIN: CPT | Mod: S$GLB,,, | Performed by: OTOLARYNGOLOGY

## 2024-03-05 PROCEDURE — 3078F DIAST BP <80 MM HG: CPT | Mod: CPTII,S$GLB,, | Performed by: OTOLARYNGOLOGY

## 2024-03-05 PROCEDURE — 3074F SYST BP LT 130 MM HG: CPT | Mod: CPTII,S$GLB,, | Performed by: OTOLARYNGOLOGY

## 2024-03-05 PROCEDURE — 99999 PR PBB SHADOW E&M-EST. PATIENT-LVL III: CPT | Mod: PBBFAC,,, | Performed by: OTOLARYNGOLOGY

## 2024-03-05 PROCEDURE — 3008F BODY MASS INDEX DOCD: CPT | Mod: CPTII,S$GLB,, | Performed by: OTOLARYNGOLOGY

## 2024-03-05 RX ORDER — CEFDINIR 300 MG/1
300 CAPSULE ORAL 2 TIMES DAILY
Qty: 14 CAPSULE | Refills: 0 | Status: SHIPPED | OUTPATIENT
Start: 2024-03-05 | End: 2024-03-12

## 2024-03-05 RX ORDER — FLUTICASONE PROPIONATE 50 MCG
1 SPRAY, SUSPENSION (ML) NASAL 2 TIMES DAILY
Qty: 11.1 ML | Refills: 11 | Status: SHIPPED | OUTPATIENT
Start: 2024-03-05 | End: 2024-04-04

## 2024-03-05 NOTE — PATIENT INSTRUCTIONS
Start antibiotics and nasal spray.  Use saline nasal spray daily.     The patient was given a prescription for a nasal steroid and/or nasal antihistamine nasal spray and we discussed in detail the proper mechanism of use directing the spray away from the nasal septum.  The patient was also instructed to take a daily oral antihistamine (Claritin, Zyrtec, Allegra, or Xyzal).    In addition, we also discussed that it will take 3-4 weeks of daily use to achieve maximal effectiveness.  The patient will please call in 3-4 weeks with their progress.  If allergy symptoms persist at that time, we could consider additional medications and possibly allergy testing.     How do you use a Nasal Corticosteroid Spray?    Make sure you understand your dosing instructions. Spray only the number of prescribed sprays in each nostril. Read the package instructions before using your spray the first time.    Most corticosteroid sprays suggest the following steps:    Wash your hands well.    Gently blow your nose to clear the passageway.    Shake the container several times.    Tilt your head slightly downward.  Use the opposite hand from the nostril you will be spraying to hold the spray bottle.    Block one nostril with your finger.  Insert the nasal applicator into the other nostril.    Aim the spray toward the outer wall of the nostril.  Inhale slowly through the nose and press the .    Breathe out and repeat to apply the prescribed number of sprays.  Repeat these steps for the other nostril.     Avoid sneezing or blowing your nose right after spraying.

## 2024-03-05 NOTE — PROGRESS NOTES
Subjective:    Here to followup after tonsillectomy    Patient ID: Sailaja Carbajal is a 18 y.o. female.    Chief Complaint:  follow up #2 from tonsillectomy     Sailaja Carbajal is a 18 y.o. female here to see me today after a recent tonsillectomy.  December.  She has overall been doing well and has no ongoing issues post surgery.    She reports over the last couple of weeks she has had some throat discomfort, nasal congestion, postnasal drip, purulent nasal discharge.  She has not been using anything consistently for her ongoing allergy issues.    Review of Systems   Constitutional: Negative for fever and fatigue.   HENT: Negative for ear pain, mouth sores, sore throat, trouble swallowing and voice change.    Respiratory: Negative for cough.    Cardiovascular: Negative for chest pain.   Neurological: Negative for headaches.       Objective:     Physical Exam   Constitutional: well-developed and well-nourished.   HENT:   Head: Normocephalic.   Right Ear: Tympanic membrane, external ear and ear canal normal. Tympanic membrane is not erythematous.   Left Ear: Tympanic membrane, external ear and ear canal normal. Tympanic membrane is not erythematous.   Nose:  Nasal mucosa erythematous with crusted purulent discharge bilaterally.  Mouth/Throat: Uvula is midline and oropharynx is clear and moist. No trismus in the jaw. Normal dentition. No uvula swelling.   Status post tonsillectomy, tonsillar fossa well healed.  Purulent postnasal drip noted from nasopharynx down into posterior oropharynx.  Lymphadenopathy:     no cervical adenopathy.       Assessment:     1. Acute non-recurrent maxillary sinusitis    2. S/P tonsillectomy    3. Allergic rhinitis, unspecified seasonality, unspecified trigger        Plan:     Restart Flonase daily.  Restart daily antihistamine.    Cefdinir for acute maxillary sinusitis.    Saline nasal spray bilateral nasal cavities daily.    Follow-up p.r.n. if symptoms do not  improve.    Liliana Villafana MD  [facility]    This note was created by combination of typed  and MModal dictation.  Transcription errors may be present.  If there are any questions, please contact me.

## 2024-08-19 ENCOUNTER — OFFICE VISIT (OUTPATIENT)
Dept: OBSTETRICS AND GYNECOLOGY | Facility: CLINIC | Age: 19
End: 2024-08-19
Payer: COMMERCIAL

## 2024-08-19 VITALS
WEIGHT: 154.31 LBS | SYSTOLIC BLOOD PRESSURE: 110 MMHG | DIASTOLIC BLOOD PRESSURE: 64 MMHG | BODY MASS INDEX: 24.91 KG/M2

## 2024-08-19 DIAGNOSIS — N89.8 VAGINAL DISCHARGE: Primary | ICD-10-CM

## 2024-08-19 DIAGNOSIS — N91.3 PRIMARY OLIGOMENORRHEA: ICD-10-CM

## 2024-08-19 PROCEDURE — 99213 OFFICE O/P EST LOW 20 MIN: CPT | Mod: S$GLB,,,

## 2024-08-19 PROCEDURE — 3074F SYST BP LT 130 MM HG: CPT | Mod: CPTII,S$GLB,,

## 2024-08-19 PROCEDURE — 1159F MED LIST DOCD IN RCRD: CPT | Mod: CPTII,S$GLB,,

## 2024-08-19 PROCEDURE — 99999 PR PBB SHADOW E&M-EST. PATIENT-LVL III: CPT | Mod: PBBFAC,,,

## 2024-08-19 PROCEDURE — 81514 NFCT DS BV&VAGINITIS DNA ALG: CPT

## 2024-08-19 PROCEDURE — 3078F DIAST BP <80 MM HG: CPT | Mod: CPTII,S$GLB,,

## 2024-08-19 PROCEDURE — 3008F BODY MASS INDEX DOCD: CPT | Mod: CPTII,S$GLB,,

## 2024-08-19 RX ORDER — OLANZAPINE 10 MG/1
10 TABLET ORAL NIGHTLY
COMMUNITY

## 2024-08-19 RX ORDER — GABAPENTIN 300 MG/1
1 CAPSULE ORAL 3 TIMES DAILY
COMMUNITY
Start: 2024-07-05

## 2024-08-19 RX ORDER — GABAPENTIN 100 MG/1
100-200 CAPSULE ORAL 3 TIMES DAILY
COMMUNITY
Start: 2024-03-06

## 2024-08-19 RX ORDER — CLONIDINE HYDROCHLORIDE 0.1 MG/1
0.1 TABLET ORAL 2 TIMES DAILY
COMMUNITY

## 2024-08-19 NOTE — PROGRESS NOTES
Chief Complaint: Vaginal Discharge     HPI:      Sailaja Carbajal is a 19 y.o.  who presents with complaint of vaginal discharge for the past few months.  Reports she has been seen and evaluated by 3 other gyn's about the vaginal discharge.  She has been given antibiotics (unsure what for) and terconazole for yeast.  Reports swabs have all been negative.  Discharge is white and pasty in consistency. Endorses fishy odor.  Denies itching or irritation. Denies pelvic pain. She is currently sexually active with a single female partner.  Has had STD testing done in the past and has been negative.  Sometimes uses toys - denies pain or abnormal vaginal bleeding with use.      Patient's last menstrual period was 08/15/2024.  Additionally reports hx of irregular periods.  Menarche around age 14.  Has only had maybe 10 periods since onset.  Reports has had workup for this as well and doctors could never tell her what was wrong.  She has a hx of anorexia which she has had treatment for but reports eating disorder didn't start until she was 16 and she was having inconsistent periods before this.  Now at a healthy weight and still has very irregular cycles.    ROS:   GENERAL: Denies weight gain or weight loss. Feeling well overall.   SKIN: Denies rash or lesions.   ABDOMEN: Denies abdominal pain, constipation, diarrhea, nausea, vomiting, change in appetite or rectal bleeding.   URINARY: Denies frequency, dysuria, hematuria.  GYN: See HPI.    Physical Exam:      PHYSICAL EXAM:   Vitals:    24 1521   BP: 110/64   Weight: 70 kg (154 lb 5.2 oz)     Body mass index is 24.91 kg/m².    General: No acute distress, alert and engaged  VULVA: normal appearing vulva with no masses, tenderness or lesions   VAGINA: normal appearing vagina with normal color. Old bloody discharge (recently on period), no lesions   CERVIX: normal appearing cervix without discharge or lesions   UTERUS: uterus is normal size, shape,  consistency and nontender   ADNEXA: normal adnexa in size, nontender and no masses    Assessment/Plan:     Vaginal discharge  -     Vaginosis Screen by DNA Probe    Primary oligomenorrhea  -     US Pelvis Comp with Transvag NON-OB (xpd; Future; Expected date: 08/19/2024    Affirm collected. Exam without concern for acute infection.   Reviewed care everywhere with outside physicians. Tested positive for mycoplasma in October 2023.  If swab negative, could consider retesting for mycoplasma/ureaplasma.  Additionally needs vaginitis education reviewed.  Doesn't look like TVUS has been performed in the past. Order placed.  Plan to review at annual.  Consider labs as well as part of workup.  Plan for OCP's or cyclic provera for cycle regulation.     Use of Metaps and Patient Portal recommended to patient today.    Due for annual - pt to schedule TVUS and annual.    Joanna Ruiz (Maggie), MADELEINE  Obstetrics and Gynecology  [facility]

## 2024-08-22 ENCOUNTER — PATIENT MESSAGE (OUTPATIENT)
Dept: OBSTETRICS AND GYNECOLOGY | Facility: CLINIC | Age: 19
End: 2024-08-22
Payer: COMMERCIAL

## 2024-08-22 LAB
BACTERIAL VAGINOSIS DNA: NEGATIVE
CANDIDA GLABRATA DNA: NEGATIVE
CANDIDA KRUSEI DNA: NEGATIVE
CANDIDA RRNA VAG QL PROBE: NEGATIVE
T VAGINALIS RRNA GENITAL QL PROBE: NEGATIVE

## 2024-10-14 ENCOUNTER — HOSPITAL ENCOUNTER (OUTPATIENT)
Dept: RADIOLOGY | Facility: OTHER | Age: 19
Discharge: HOME | End: 2024-10-14
Payer: COMMERCIAL

## 2024-10-14 ENCOUNTER — OFFICE VISIT (OUTPATIENT)
Dept: OBSTETRICS AND GYNECOLOGY | Facility: CLINIC | Age: 19
End: 2024-10-14
Payer: COMMERCIAL

## 2024-10-14 VITALS
WEIGHT: 153.44 LBS | BODY MASS INDEX: 24.77 KG/M2 | DIASTOLIC BLOOD PRESSURE: 68 MMHG | SYSTOLIC BLOOD PRESSURE: 116 MMHG

## 2024-10-14 DIAGNOSIS — Z12.4 CERVICAL CANCER SCREENING: ICD-10-CM

## 2024-10-14 DIAGNOSIS — N91.3 PRIMARY OLIGOMENORRHEA: ICD-10-CM

## 2024-10-14 DIAGNOSIS — Z01.419 ENCOUNTER FOR WELL WOMAN EXAM WITH ROUTINE GYNECOLOGICAL EXAM: Primary | ICD-10-CM

## 2024-10-14 DIAGNOSIS — N89.8 VAGINAL DISCHARGE: ICD-10-CM

## 2024-10-14 PROCEDURE — 88175 CYTOPATH C/V AUTO FLUID REDO: CPT

## 2024-10-14 PROCEDURE — 76830 TRANSVAGINAL US NON-OB: CPT | Mod: 26,,, | Performed by: INTERNAL MEDICINE

## 2024-10-14 PROCEDURE — 76830 TRANSVAGINAL US NON-OB: CPT | Mod: TC

## 2024-10-14 PROCEDURE — 3008F BODY MASS INDEX DOCD: CPT | Mod: CPTII,S$GLB,,

## 2024-10-14 PROCEDURE — 3078F DIAST BP <80 MM HG: CPT | Mod: CPTII,S$GLB,,

## 2024-10-14 PROCEDURE — 76856 US EXAM PELVIC COMPLETE: CPT | Mod: TC

## 2024-10-14 PROCEDURE — 76856 US EXAM PELVIC COMPLETE: CPT | Mod: 26,,, | Performed by: INTERNAL MEDICINE

## 2024-10-14 PROCEDURE — 99395 PREV VISIT EST AGE 18-39: CPT | Mod: S$GLB,,,

## 2024-10-14 PROCEDURE — 1159F MED LIST DOCD IN RCRD: CPT | Mod: CPTII,S$GLB,,

## 2024-10-14 PROCEDURE — 99999 PR PBB SHADOW E&M-EST. PATIENT-LVL III: CPT | Mod: PBBFAC,,,

## 2024-10-14 PROCEDURE — 3074F SYST BP LT 130 MM HG: CPT | Mod: CPTII,S$GLB,,

## 2024-10-14 NOTE — PROGRESS NOTES
OBSTETRICS AND GYNECOLOGY      Chief Complaint:  Well Woman Exam     HPI:      Sailaja Carbajal is a 19 y.o.  who presents today for well woman exam. Seen a couple months ago w/ complaints of vaginal discharge and irregular periods (see HPI from visit below). Affirm negative. TVUS ordered, to be reviewed today.  LMP: Patient's last menstrual period was 2024. Reports normal period in August and September. Still with vaginal discharge today.  Denies itching or irritation. No other issues, problems, or complaints. Specifically, patient denies abnormal vaginal bleeding, pelvic pain, or dysuria/hematuria. Ms. Carbajal is currently sexually active with a single female partner. She is currently using no method for contraception. She declines STD screening today.     Previous Pap: Never    STD/STI Hx: Denies any history of STD's  Social: Wears seatbelts. Exercises. Feels safe at home.   Smoking status: Never  Ms. Carbajal confirms that she wears her seatbelt when riding in the car and does not text while driving.     FH:  Breast cancer: none  Colon cancer: none  Ovarian cancer: none  Endometrial cancer: none    HPI (2024):  Sailaja Carbajal is a 19 y.o.  who presents with complaint of vaginal discharge for the past few months.  Reports she has been seen and evaluated by 3 other gyn's about the vaginal discharge.  She has been given antibiotics (unsure what for) and terconazole for yeast.  Reports swabs have all been negative.  Discharge is white and pasty in consistency. Endorses fishy odor.  Denies itching or irritation. Denies pelvic pain. She is currently sexually active with a single female partner.  Has had STD testing done in the past and has been negative.  Sometimes uses toys - denies pain or abnormal vaginal bleeding with use.       Patient's last menstrual period was 08/15/2024.  Additionally reports hx of irregular periods.  Menarche around age 14.  Has only had  maybe 10 periods since onset.  Reports has had workup for this as well and doctors could never tell her what was wrong.  She has a hx of anorexia which she has had treatment for but reports eating disorder didn't start until she was 16 and she was having inconsistent periods before this.  Now at a healthy weight and still has very irregular cycles.    OB History          0    Para   0    Term   0       0    AB   0    Living   0         SAB   0    IAB   0    Ectopic   0    Multiple   0    Live Births   0                 ROS:     GENERAL: Feeling well overall.   SKIN: Denies rash.   HEENT: Denies headaches or vision changes.   CARDIOVASCULAR: Denies chest pain.   RESP: Denies shortness of breath.  BREASTS: Denies lumps or nipple discharge.   ABD: Denies constipation, diarrhea, nausea, vomiting.  URINARY: Denies dysuria, hematuria.  NEUROLOGIC: Denies syncope, falls.     Physical Exam:      PHYSICAL EXAM:  /68   Wt 69.6 kg (153 lb 7 oz)   LMP 2024   BMI 24.77 kg/m²   Body mass index is 24.77 kg/m².     APPEARANCE:  Well nourished, well developed, in no acute distress.  Able to smile appropriately during our encounter. Makes eye contact. Pleasant.  PSYCH: Appropriate mood and affect.  SKIN:   No acne or hirsutism.  CARDIOVASCULAR:  No edema of peripheral extremities. Well perfused throughout.  RESP:  No accessory muscle use to breathe. Speaking comfortably in complete sentences.   BREASTS:  Symmetrical, with no visible skin lesions or scars, no palpable masses. No nipple discharge or peau d'orange bilaterally. No palpable axillary LAD.  ABDOMEN:  Soft. No tenderness or masses.    PELVIC:  Normal external genitalia without lesions. Normal hair distribution. Adequate perineal body, normal urethral meatus. Vagina moist and well rugated. Without lesions, without discharge. Cervix pink, without ectocervical lesions, discharge or tenderness.  No ectropion. No significant cystocele or rectocele.   Bimanual exam shows uterus to be normal size, regular, mobile and nontender.  Adnexa without masses or tenderness.    Gyn note:    A female chaperone was present for the breast and pelvic exam.     Results:     EXAMINATION:  US PELVIS COMP WITH TRANSVAG NON-OB (XPD)     CLINICAL HISTORY:  Primary oligomenorrhea     TECHNIQUE:  Transabdominal sonography of the pelvis was performed, followed by transvaginal sonography to better evaluate the uterus and ovaries.     COMPARISON:  None.     FINDINGS:  Uterus:     Size: 6.9 x 4.6 x 2.8 cm     Masses: Few nabothian cysts.  No solid mass.     Endometrium: Normal in this pre menopausal patient, measuring 4 mm.     Right ovary:     Size: 3.4 x 2.9 x 1.7 cm     Appearance: Normal.     Vascular flow: Normal.     Left ovary:     Size: 2.8 x 2.8 x 1.5 cm     Appearance: Normal.     Vascular Flow: Normal.     Free Fluid:     None.     Impression:     No sonographic abnormality.     Assessment/Plan:     Encounter for well woman exam with routine gynecological exam  -     Counseled patient regarding healthy diet and regular exercise, daily multivitamin, daily seat belt use.   -     Discussed healthy weight.  -     BP normotensive        -     She denies abuse and feels safe at home.        -     Pap smear:  Performed    Cervical cancer screening  -     Liquid-Based Pap Smear, Screening    Vaginal discharge  -     Normal physiological discharge on exam today.  -     Vaginal dc w/o complaints of itching or irritation.  -     Discussed changes in discharge can be secondary to hormonal fluctuations.  Recommend Bonafide Clarvee probiotics.  Pt to follow up if no improvement or development of other symptoms.    Primary oligomenorrhea  -     TVUS reviewed.  Normal.  -     Pt w/ return of menses for the last two months.  I suspect that with appropriation in weight, menses regularity is returning.    -     Discussed considering labs for workup however I feel like menses may be returning to  normal.  Will monitor for now and if pt skips another month or menses become irregular I would start laboratory workup.  Pt in agreement with plan.    Follow up in 1 year for annual or sooner PRN.    Counseling:     Patient was counseled today on current ASCCP pap guidelines, the recommendation for yearly physical exams, safe driving habits, and breast self awareness. She is to see her PCP for other health maintenance.     Use of the [facility] Patient Portal discussed and encouraged during today's visit.     Joanna Ruiz (Maggie), MADELEINE  Obstetrics and Gynecology  [facility]

## 2024-10-30 DIAGNOSIS — M25.562 LEFT KNEE PAIN: Primary | ICD-10-CM

## 2024-11-01 ENCOUNTER — CLINICAL SUPPORT (OUTPATIENT)
Dept: REHABILITATION | Facility: OTHER | Age: 19
End: 2024-11-01
Payer: COMMERCIAL

## 2024-11-01 DIAGNOSIS — R29.898 DECREASED STRENGTH INVOLVING KNEE JOINT: Primary | ICD-10-CM

## 2024-11-01 DIAGNOSIS — M25.669 DECREASED RANGE OF MOTION OF LOWER EXTREMITY, UNSPECIFIED LATERALITY: ICD-10-CM

## 2024-11-01 PROCEDURE — 97110 THERAPEUTIC EXERCISES: CPT

## 2024-11-01 PROCEDURE — 97161 PT EVAL LOW COMPLEX 20 MIN: CPT

## 2024-11-12 NOTE — PROGRESS NOTES
"  Physical Therapy Daily Treatment Note     Name: Sailaja Carbajal  Clinic Number: [id number]    Therapy Diagnosis:   Encounter Diagnoses   Name Primary?    Decreased strength involving knee joint Yes    Decreased range of motion of lower extremity, unspecified laterality      Physician: Ramonita Carbajal DC    Visit Date: 11/13/2024    Physician Orders: PT Eval and Treat   Medical Diagnosis from Referral: M25.562 (ICD-10-CM) - Left knee pain   Evaluation Date: 11/1/2024  Authorization Period Expiration: 10/30/25  Plan of Care Expiration:  01/10/24  Visit # / Visits authorized: 1/ 1   Progress Note Due:  11/29/24  FOTO: 1/ 1     Precautions: Standard     Time In: 1000 am  Time Out: 1055  am   Total Appointment Time (timed & untimed codes): 55 minutes    Subjective     Pt reports: She is doing a lot better. She has been keeping up with the exercises except the lateral step downs because she doesn't have anywhere to do it.   she was compliant with home exercise program given last session.   Response to previous treatment:Eval  Functional change: Ongoing    Pain: 1/10  Location: left knee      Pts goals: "be confident I can get back on roller skate safely and workout like I use to"   Objective     Updated at Progress Report Unless Specified    Treatment     Sailaja received the following manual therapy techniques for 00 minutes:       Sailaja received therapeutic exercises to develop strength, endurance, ROM, and flexibility for 55 minutes including:     Elliptical x5  double limb bridge into hamstring curl, 2 x 10   Lateral step downs c/ RTB, 6 in, 2x10  TKE, RTB, 2x10x3"  Single leg bridge, 2x10x 3"  SL hip add (LLE) 2 x 10 2#   Sidelying hip abduction, 2x10x3", 2#  Standing fire hydrant, GTB, 2x10x3"  Single leg squat to chair, 2x10 + GTB pulling laterally  Squat with hip abd GTB at knees 2 x 10   Lateral walks GTB x 3 laps   Single leg stance 10# kettlebell pass, 1'  SL lunge L with slider 2 x 5  " (FWD,LATERAL/Reverse)     NV:  Greek split,  single leg balance on BOSU ball (ball up), hip abductor bridge, step up 8 in, MedX: single limb leg press/leg extension/hamstring curls, single leg squats on incline     Sailaja participated in dynamic functional therapeutic activities to improve functional performance for 00 minutes, including:      Sailaja participated in neuromuscular re-education activities to improve: Balance, Coordination, and Proprioception for 00 minutes. The following activities were included:        Home Exercises and Education Provided     Education provided:   - DOMS  - Progress towards goals     Written Home Exercises Provided: Patient instructed to cont prior HEP.  Exercises were reviewed and Sailaja was able to demonstrate them prior to the end of the session.  Sailaja demonstrated good  understanding of the HEP provided.   .   See EMR under Patient Instructions for exercises provided 11/1/2024.  Assessment      Sailaja presents to first follow up with out c/o  pain. Reviewed HEP with pt able to perform with min cues indicating HEP compliance. Pt requires verbal and tactile cues for knee valgus. Quad weakness present with fatigue noted especially with single leg exercise.        Sailaja is progressing well towards her goals and there are no updates to goals at this time. Pt prognosis is Good.     Pt will continue to benefit from skilled outpatient physical therapy to address the deficits listed in the problem list on initial evaluation provide pt/family education and to maximize pt's level of independence in the home and community environment.     Anticipated Barriers for therapy: none     Pt's spiritual, cultural and educational needs considered and pt agreeable to plan of care and goals.    GOALS: Short Term Goals:  4 weeks  1.Report decreased in pain at worse less than  <   / =  6  /10  to increase tolerance for functional mobility.On going  2. Pt will be able to negotiate stairs with  minimal to no complications indicating improved tolerance to activity.On going  3. Increased L knee flexion MMT 1/2 grade to increase tolerance for ADL and work activities.On going  4. Pt to report ability to stand for 30 minutes or more with minimal to no complications indicating improved tolerance to activity. On going   5. Pt to tolerate HEP to improve ROM and independence with ADL's.On going     Long Term Goals: 8 weeks  1.Report decreased in pain at worse less than  <   / =  3  /10  to increase tolerance for functional mobility. On going  2.Pt to report ability to return to [place] with minimal to no complications indicating improved tolerance to activity. On going  3.Increased L knee flexion MMT 1 grade to increase tolerance for ADL and work activities.On going  4. Pt will report 82% ore more on FOTO knee survey  to demonstrate increase in LE function with every day tasks. On going  5. Pt to be Independent with HEP to improve ROM and independence with ADL's. On going     Plan   Plan of care Certification: 11/1/2024 to 1/10/25.      Sammy Edmonds, PTA

## 2024-11-13 ENCOUNTER — CLINICAL SUPPORT (OUTPATIENT)
Dept: REHABILITATION | Facility: OTHER | Age: 19
End: 2024-11-13
Payer: COMMERCIAL

## 2024-11-13 DIAGNOSIS — M25.669 DECREASED RANGE OF MOTION OF LOWER EXTREMITY, UNSPECIFIED LATERALITY: ICD-10-CM

## 2024-11-13 DIAGNOSIS — R29.898 DECREASED STRENGTH INVOLVING KNEE JOINT: Primary | ICD-10-CM

## 2024-11-13 PROCEDURE — 97110 THERAPEUTIC EXERCISES: CPT | Mod: CQ

## 2024-11-21 ENCOUNTER — TELEPHONE (OUTPATIENT)
Dept: SURGERY | Facility: CLINIC | Age: 19
End: 2024-11-21
Payer: COMMERCIAL

## 2024-11-21 ENCOUNTER — CLINICAL SUPPORT (OUTPATIENT)
Dept: REHABILITATION | Facility: OTHER | Age: 19
End: 2024-11-21
Payer: COMMERCIAL

## 2024-11-21 DIAGNOSIS — R29.898 DECREASED STRENGTH INVOLVING KNEE JOINT: Primary | ICD-10-CM

## 2024-11-21 DIAGNOSIS — M25.669 DECREASED RANGE OF MOTION OF LOWER EXTREMITY, UNSPECIFIED LATERALITY: ICD-10-CM

## 2024-11-21 PROCEDURE — 97112 NEUROMUSCULAR REEDUCATION: CPT

## 2024-11-21 PROCEDURE — 97110 THERAPEUTIC EXERCISES: CPT

## 2024-11-21 NOTE — PROGRESS NOTES
"  Physical Therapy Daily Treatment Note     Name: Sailaja Carbajal  Clinic Number: [id number]    Therapy Diagnosis:   Encounter Diagnoses   Name Primary?    Decreased strength involving knee joint Yes    Decreased range of motion of lower extremity, unspecified laterality      Physician: Ramonita Carbajal DC    Visit Date: 11/21/2024    Physician Orders: PT Eval and Treat   Medical Diagnosis from Referral: M25.562 (ICD-10-CM) - Left knee pain   Evaluation Date: 11/1/2024  Authorization Period Expiration: 10/30/25  Plan of Care Expiration:  01/10/24  Visit # / Visits authorized: 3/20  Progress Note Due:  11/29/24  FOTO: 1/ 1     Precautions: Standard     Time In: 5:00 pm  Time Out: 6:00 pm   Total Appointment Time (timed & untimed codes): 55 minutes    Subjective     Pt reports: knee has been doing a lot better since starting therapy    she was compliant with home exercise program given last session.   Response to previous treatment: no adverse effects  Functional change: Ongoing    Pain: 1/10  Location: left knee      Pts goals: "be confident I can get back on roller skate safely and workout like I use to"   Objective     Updated at Progress Report Unless Specified    Treatment     Sailaja received the following manual therapy techniques for 00 minutes:       Sailaja received therapeutic exercises to develop strength, endurance, ROM, and flexibility for 30 minutes including:     Elliptical x5  double limb bridge into hamstring curl, 2 x 10   Lateral step downs c/ RTB, 6 in, 2x10  TKE, RTB, 2x10x3"  Single leg bridge, 2x10x 3"  SL hip add (LLE) 2 x 10 2#   Sidelying hip abduction, 2x10x3", 2#  Standing fire hydrant, GTB, 2x10x3"  Single leg squat to chair, 2x10 + GTB pulling laterally  Squat with hip abd GTB at knees 2 x 10           Sailaja participated in dynamic functional therapeutic activities to improve functional performance for 00 minutes, including:      Sailaja participated in neuromuscular " "re-education activities to improve: Balance, Coordination, and Proprioception for 25 minutes. The following activities were included:    Lateral walks GTB x 3 laps   Single leg stance 10# kettlebell pass, 1'  SL lunge L with slider 2 x 5  (FWD,LATERAL/Reverse)  Greenlandic split squat 3x6 ea  single leg balance on BOSU ball (ball up), 3x30"  hip abductor bridge 2x10x3"  Side step up 8 in 2x10x3"      Home Exercises and Education Provided     Education provided:   - DOMS  - Progress towards goals     Written Home Exercises Provided: Patient instructed to cont prior HEP.  Exercises were reviewed and Sailaja was able to demonstrate them prior to the end of the session.  Sailaja demonstrated good  understanding of the HEP provided.   .   See EMR under Patient Instructions for exercises provided 11/1/2024.  Assessment     Sailaja presents today without complaints of pain. Progressed LE strengthening and dynamic quad motor control activities with good tolerance and no adverse effects. Continue to progress as able.     Sailaja is progressing well towards her goals and there are no updates to goals at this time. Pt prognosis is Good.     Pt will continue to benefit from skilled outpatient physical therapy to address the deficits listed in the problem list on initial evaluation provide pt/family education and to maximize pt's level of independence in the home and community environment.     Anticipated Barriers for therapy: none     Pt's spiritual, cultural and educational needs considered and pt agreeable to plan of care and goals.    GOALS: Short Term Goals:  4 weeks  1.Report decreased in pain at worse less than  <   / =  6  /10  to increase tolerance for functional mobility.On going  2. Pt will be able to negotiate stairs with minimal to no complications indicating improved tolerance to activity.On going  3. Increased L knee flexion MMT 1/2 grade to increase tolerance for ADL and work activities.On going  4. Pt to report " ability to stand for 30 minutes or more with minimal to no complications indicating improved tolerance to activity. On going   5. Pt to tolerate HEP to improve ROM and independence with ADL's.On going     Long Term Goals: 8 weeks  1.Report decreased in pain at worse less than  <   / =  3  /10  to increase tolerance for functional mobility. On going  2.Pt to report ability to return to [place] with minimal to no complications indicating improved tolerance to activity. On going  3.Increased L knee flexion MMT 1 grade to increase tolerance for ADL and work activities.On going  4. Pt will report 82% ore more on FOTO knee survey  to demonstrate increase in LE function with every day tasks. On going  5. Pt to be Independent with HEP to improve ROM and independence with ADL's. On going     Plan   Plan of care Certification: 11/1/2024 to 1/10/25.      Armando Oden, PT   11/21/2024

## 2024-11-25 ENCOUNTER — OFFICE VISIT (OUTPATIENT)
Dept: SURGERY | Facility: CLINIC | Age: 19
End: 2024-11-25
Payer: COMMERCIAL

## 2024-11-25 VITALS
BODY MASS INDEX: 24.63 KG/M2 | WEIGHT: 153.25 LBS | HEIGHT: 66 IN | HEART RATE: 81 BPM | DIASTOLIC BLOOD PRESSURE: 78 MMHG | SYSTOLIC BLOOD PRESSURE: 115 MMHG

## 2024-11-25 DIAGNOSIS — K62.89 ANAL IRRITATION: Primary | ICD-10-CM

## 2024-11-25 PROCEDURE — 99204 OFFICE O/P NEW MOD 45 MIN: CPT | Mod: S$GLB,,, | Performed by: NURSE PRACTITIONER

## 2024-11-25 PROCEDURE — 99999 PR PBB SHADOW E&M-EST. PATIENT-LVL IV: CPT | Mod: PBBFAC,,, | Performed by: NURSE PRACTITIONER

## 2024-11-25 PROCEDURE — 3078F DIAST BP <80 MM HG: CPT | Mod: CPTII,S$GLB,, | Performed by: NURSE PRACTITIONER

## 2024-11-25 PROCEDURE — 1159F MED LIST DOCD IN RCRD: CPT | Mod: CPTII,S$GLB,, | Performed by: NURSE PRACTITIONER

## 2024-11-25 PROCEDURE — 3074F SYST BP LT 130 MM HG: CPT | Mod: CPTII,S$GLB,, | Performed by: NURSE PRACTITIONER

## 2024-11-25 PROCEDURE — 3008F BODY MASS INDEX DOCD: CPT | Mod: CPTII,S$GLB,, | Performed by: NURSE PRACTITIONER

## 2024-11-25 PROCEDURE — 1160F RVW MEDS BY RX/DR IN RCRD: CPT | Mod: CPTII,S$GLB,, | Performed by: NURSE PRACTITIONER

## 2024-11-25 RX ORDER — HYDROCORTISONE 25 MG/G
CREAM TOPICAL 2 TIMES DAILY
Qty: 28 G | Refills: 1 | Status: SHIPPED | OUTPATIENT
Start: 2024-11-25 | End: 2024-12-09

## 2024-11-25 NOTE — PATIENT INSTRUCTIONS
Calmoseptine constantly to anus for 2-3 weeks  If zero improvement in above, use the hydrocortisone cream around anus till our f/u visit.  Psyllium fiber every day  64 oz of water per day

## 2024-11-25 NOTE — PROGRESS NOTES
"CRS Office Visit History and Physical    Referring Md:   Self, Aaareferral  No address on file    SUBJECTIVE:     Chief Complaint: anal fissure    History of Present Illness:  The patient is a new patient to this practice.   Course is as follows:  Patient is a 19 y.o. female presents with anal fissure.  She reports 2-3 months ago feels like ripping at anus, itching, bleeding, burning pain with a BM  Has a BM QOD, started fiber no  denies straining/prolonged time on toilet with bowel movements.  Started   Blood thinners: No    Last Colonoscopy: at age 12-13, WNL besides anal fissure  Family history of colorectal cancer or IBD: no    Review of patient's allergies indicates:  No Known Allergies    Past Medical History:   Diagnosis Date    Anxiety     Depression      Past Surgical History:   Procedure Laterality Date    TONSILLECTOMY, ADENOIDECTOMY Bilateral 12/11/2023    Procedure: TONSILLECTOMY AND ADENOIDECTOMY;  Surgeon: Liliana Villafana MD;  Location: [facility] OR;  Service: ENT;  Laterality: Bilateral;  T&A set; suction bovie     Family History   Problem Relation Name Age of Onset    No Known Problems Father      No Known Problems Mother      Breast cancer Neg Hx      Colon cancer Neg Hx      Ovarian cancer Neg Hx       Social History     Tobacco Use    Smoking status: Never     Passive exposure: Never    Smokeless tobacco: Never   Substance Use Topics    Alcohol use: Yes    Drug use: Never        Review of Systems:  Review of Systems   Constitutional:  Negative for chills, fever and weight loss.       OBJECTIVE:     Vital Signs (Most Recent)  /78 (BP Location: Left arm, Patient Position: Sitting)   Pulse 81   Ht 5' 6" (1.676 m)   Wt 69.5 kg (153 lb 3.5 oz)   BMI 24.73 kg/m²     Physical Exam:  General: White female in no distress   Neuro: Alert and oriented to person, place, and time.  Moves all extremities.     HEENT: No icterus.  Trachea midline  Respiratory: Respirations are even and unlabored, no cough " or audible wheezing  Skin: Warm dry and intact, No visible rashes, no jaundice    Labs reviewed today:  Lab Results   Component Value Date    WBC 5.40 03/10/2023    HGB 13.9 03/10/2023    HCT 42.7 03/10/2023     03/10/2023    CHOL 186 04/13/2022    TRIG 63 04/13/2022    HDL 58 04/13/2022    ALT 7 (L) 03/10/2023    AST 11 03/10/2023     03/10/2023    K 3.9 03/10/2023     03/10/2023    CREATININE 0.7 03/10/2023    BUN 17 03/10/2023    CO2 23 03/10/2023    TSH 0.436 03/06/2023    HGBA1C 4.7 04/13/2022       Anorectal Exam:    Anal Skin:  Perianal skin rash, red, multiple superficial fissuring    Digital Rectal Exam:  deferred      ASSESSMENT/PLAN:     Sailaja was seen today for anal fissure.    Diagnoses and all orders for this visit:    Anal irritation  -     hydrocortisone (ANUSOL-HC) 2.5 % rectal cream; Place rectally 2 (two) times daily. for 14 days      18 yo F here with anal irritation. Perianal rash on exam. Unsure of contributing factors    The patient was instructed to:  Calmoseptine constantly to anus for 2-3 weeks  If zero improvement in above, use the hydrocortisone cream around anus till our f/u visit.  Psyllium fiber every day  64 oz of water per day    FREDERIC Ascencio  Colon and Rectal Surgery

## 2024-12-05 ENCOUNTER — CLINICAL SUPPORT (OUTPATIENT)
Dept: REHABILITATION | Facility: OTHER | Age: 19
End: 2024-12-05
Payer: COMMERCIAL

## 2024-12-05 DIAGNOSIS — R29.898 DECREASED STRENGTH INVOLVING KNEE JOINT: Primary | ICD-10-CM

## 2024-12-05 DIAGNOSIS — M25.662 DECREASED RANGE OF MOTION OF LEFT LOWER EXTREMITY: ICD-10-CM

## 2024-12-05 PROCEDURE — 97110 THERAPEUTIC EXERCISES: CPT

## 2024-12-05 PROCEDURE — 97530 THERAPEUTIC ACTIVITIES: CPT

## 2024-12-05 PROCEDURE — 97112 NEUROMUSCULAR REEDUCATION: CPT

## 2024-12-05 NOTE — PROGRESS NOTES
"  Physical Therapy Progress  Note     Name: Sailaja Carbajal  Clinic Number: [id number]    Therapy Diagnosis:   Encounter Diagnoses   Name Primary?    Decreased strength involving knee joint Yes    Decreased range of motion of left lower extremity      Physician: Ramonita Carbajal DC    Visit Date: 12/5/2024    Physician Orders: PT Eval and Treat   Medical Diagnosis from Referral: M25.562 (ICD-10-CM) - Left knee pain   Evaluation Date: 11/1/2024  Authorization Period Expiration: 10/30/25  Plan of Care Expiration:  01/10/24  Visit # / Visits authorized: 4/20  Progress Note Due:  1/5/2024  FOTO: 1/ 1     Precautions: Standard     Time In: 5:00 pm  Time Out: 6:00 pm   Total Appointment Time (timed & untimed codes): 55 minutes    Subjective     Pt reports: no pain today, just stiff, continuing to improve    she was compliant with home exercise program given last session.   Response to previous treatment: no adverse effects  Functional change: Ongoing    Pain: 0/10  Location: left knee      Pts goals: "be confident I can get back on roller skate safely and workout like I use to"   Objective     GAIT DEVIATIONS: Sailaja displays antalgic gait;flexed posturing;decreased weight shift;excessive L ankle EV     Range of Motion:   Knee Left active Left Passive   Flexion WFL WFL   Extension WFL WFL      Knee Right active Right Passive   Flexion WFL WFL   Extension WFL WFL         Lower Extremity Strength   Right LE   Left LE     Knee extension: 5/5 Knee extension: 5/5   Knee flexion: 5/5 Knee flexion: 4+/5; pain    Hip flexion: 5/5 Hip flexion: 5/5   Hip extension:  5/5 Hip extension: 5/5   Hip abduction: 5/5 Hip abduction: 5/5   Hip adduction: 5/5 Hip adduction 5/5   Ankle dorsiflexion: 5/5 Ankle dorsiflexion: 5/5   Ankle plantarflexion: 5/5 Ankle plantarflexion: 5/5           Squat: Positive; L knee valgum   Single leg balance: able to maintain but increase postural sway and motor control deficits noted on L only    " "          R: 30 sec L: 30 sec      Palpation: tenderness to palpation detected along proximal and inferior patella tendon      Girth Measurement Joint line   Left 37 cm   Right 36 cm           Treatment     Sailaja received the following manual therapy techniques for 00 minutes:       Sailaja received therapeutic exercises to develop strength, endurance, ROM, and flexibility for 20 minutes including:     Elliptical x5  double limb bridge into hamstring curl, 2 x 10   Lateral step downs c/ RTB, 6 in, 2x10  TKE, RTB, 2x10x3"  Single leg bridge, 2x10x 3"  SL hip add (LLE) 2 x 10 2# - NP  Sidelying hip abduction, 2x10x3", 2# - NP  Standing fire hydrant, GTB, 2x10x3"  Single leg squat to chair, 2x10 + GTB pulling laterally - NP  Squat with hip abd GTB at knees 2 x 10       Sailaja participated in dynamic functional therapeutic activities to improve functional performance for 10 minutes, including:    obj    Sailaja participated in neuromuscular re-education activities to improve: Balance, Coordination, and Proprioception for 25 minutes. The following activities were included:    Lateral walks GTB x 3 laps   Single leg stance 10# kettlebell pass, 1'  SL lunge L with slider 2 x 5  (FWD,LATERAL/Reverse)  [place] split squat 3x6 ea  single leg balance on BOSU ball (ball up), 3x30"  hip abductor bridge 2x10x3"  Side step up 8 in 2x10x3"      Home Exercises and Education Provided     Education provided:   - DOMS  - Progress towards goals     Written Home Exercises Provided: Patient instructed to cont prior HEP.  Exercises were reviewed and Sailaja was able to demonstrate them prior to the end of the session.  Sailaja demonstrated good  understanding of the HEP provided.   .   See EMR under Patient Instructions for exercises provided 11/1/2024.  Assessment     Sailaja presents today without complaints of pain. Reassessment performed with pt continue to demo L knee instability, decreased proprioception, decreased dynamic quad motor " control, gait deviations, balance deficits, and knee pain limiting function. She has made gains in all areas compared to initial evaluation, but continues to show deficits that will continue to be improved with continued skilled therapy. Continue to progress as tolerated.     Sailaja is progressing well towards her goals and there are no updates to goals at this time. Pt prognosis is Good.     Pt will continue to benefit from skilled outpatient physical therapy to address the deficits listed in the problem list on initial evaluation provide pt/family education and to maximize pt's level of independence in the home and community environment.     Anticipated Barriers for therapy: none     Pt's spiritual, cultural and educational needs considered and pt agreeable to plan of care and goals.    GOALS: Short Term Goals:  4 weeks  1.Report decreased in pain at worse less than  <   / =  6  /10  to increase tolerance for functional mobility.On going  2. Pt will be able to negotiate stairs with minimal to no complications indicating improved tolerance to activity.On going  3. Increased L knee flexion MMT 1/2 grade to increase tolerance for ADL and work activities.On going  4. Pt to report ability to stand for 30 minutes or more with minimal to no complications indicating improved tolerance to activity. On going   5. Pt to tolerate HEP to improve ROM and independence with ADL's.On going     Long Term Goals: 8 weeks  1.Report decreased in pain at worse less than  <   / =  3  /10  to increase tolerance for functional mobility. On going  2.Pt to report ability to return to [employer] [place] with minimal to no complications indicating improved tolerance to activity. On going  3.Increased L knee flexion MMT 1 grade to increase tolerance for ADL and work activities.On going  4. Pt will report 82% ore more on FOTO knee survey  to demonstrate increase in LE function with every day tasks. On going  5. Pt to be Independent with HEP to improve  ROM and independence with ADL's. On going     Plan   Plan of care Certification: 11/1/2024 to 1/10/25.      Armando Oden, PT   12/5/2024

## 2024-12-10 ENCOUNTER — CLINICAL SUPPORT (OUTPATIENT)
Dept: REHABILITATION | Facility: OTHER | Age: 19
End: 2024-12-10
Payer: COMMERCIAL

## 2024-12-10 DIAGNOSIS — R29.898 DECREASED STRENGTH INVOLVING KNEE JOINT: Primary | ICD-10-CM

## 2024-12-10 DIAGNOSIS — M25.662 DECREASED RANGE OF MOTION OF LEFT LOWER EXTREMITY: ICD-10-CM

## 2024-12-10 PROCEDURE — 97110 THERAPEUTIC EXERCISES: CPT | Mod: CQ

## 2024-12-10 PROCEDURE — 97112 NEUROMUSCULAR REEDUCATION: CPT | Mod: CQ

## 2024-12-10 NOTE — PROGRESS NOTES
"  Physical Therapy Daily Treatment Note     Name: Sailaja Carbajal  Clinic Number: [id number]    Therapy Diagnosis:   Encounter Diagnoses   Name Primary?    Decreased strength involving knee joint Yes    Decreased range of motion of left lower extremity        Physician: Ramonita Carbajal DC    Visit Date: 12/10/2024    Physician Orders: PT Eval and Treat   Medical Diagnosis from Referral: M25.562 (ICD-10-CM) - Left knee pain   Evaluation Date: 11/1/2024  Authorization Period Expiration: 10/30/25  Plan of Care Expiration:  01/10/24  Visit # / Visits authorized: 5/20  Progress Note Due:  1/05/25  FOTO: 1/ 1     Precautions: Standard     Time In: 200 pm  Time Out: 255 pm   Total Appointment Time (timed & untimed codes): 55 minutes    Subjective     Pt reports: Just stiffness which improves as she starts walking.     she was compliant with home exercise program given last session.   Response to previous treatment: no adverse effects  Functional change: Ongoing    Pain: 0/10  Location: left knee      Pts goals: "be confident I can get back on roller skate safely and workout like I use to"   Objective     Updated at Progress Report Unless Specified    Treatment     Sailaja received the following manual therapy techniques for 00 minutes:       Sailaja received therapeutic exercises to develop strength, endurance, ROM, and flexibility for 30 minutes including:     Elliptical x5  double limb bridge into hamstring curl, 2 x 10   Lateral step downs c/ RTB, 6 in, 2x10  TKE, RTB, 2x10x3"  Single leg bridge, 2x10x 3"  SL hip add (LLE) 2 x 10 2# - np  Sidelying hip abduction, 2x10x3", 2# - np  Standing fire hydrant, GTB, 2x10x3"  Single leg squat to chair, 2x10 + GTB pulling laterally  Squat with hip abd GTB at knees 2 x 10       Sailaja participated in dynamic functional therapeutic activities to improve functional performance for 00 minutes, including:      Sailaja participated in neuromuscular re-education activities " "to improve: Balance, Coordination, and Proprioception for 25 minutes. The following activities were included:    Lateral walks GTB x 3 laps   Single leg stance 10# kettlebell pass, 1'  SL lunge L with slider 2 x 5  (FWD,LATERAL/Reverse)  [place] split squat 2 x 10  ea (R leg elevated)  single leg balance on BOSU ball (ball up), 3x30"  hip abductor bridge 2x10x3"  Side step up 8 in 2x10x3"  +Banded (at ankles)Hip abd iso RTB on 6in step 3 x 40"  +standing captain dejuan with mini squat 2 x 10 ea    Home Exercises and Education Provided     Education provided:   - DOMS  - Progress towards goals     Written Home Exercises Provided: Patient instructed to cont prior HEP.  Exercises were reviewed and Sailaja was able to demonstrate them prior to the end of the session.  Sailaja demonstrated good  understanding of the HEP provided.   .   See EMR under Patient Instructions for exercises provided 11/1/2024.  Assessment     Sailaja tolerated session well. Pt continues with quad and glut weakness. Pt requires verbal and tactile cues for knee valgus. Progress as tolerated.     Sailaja is progressing well towards her goals and there are no updates to goals at this time. Pt prognosis is Good.     Pt will continue to benefit from skilled outpatient physical therapy to address the deficits listed in the problem list on initial evaluation provide pt/family education and to maximize pt's level of independence in the home and community environment.     Anticipated Barriers for therapy: none     Pt's spiritual, cultural and educational needs considered and pt agreeable to plan of care and goals.    GOALS: Short Term Goals:  4 weeks  1.Report decreased in pain at worse less than  <   / =  6  /10  to increase tolerance for functional mobility.On going  2. Pt will be able to negotiate stairs with minimal to no complications indicating improved tolerance to activity.On going  3. Increased L knee flexion MMT 1/2 grade to increase tolerance " for ADL and work activities.On going  4. Pt to report ability to stand for 30 minutes or more with minimal to no complications indicating improved tolerance to activity. On going   5. Pt to tolerate HEP to improve ROM and independence with ADL's.On going     Long Term Goals: 8 weeks  1.Report decreased in pain at worse less than  <   / =  3  /10  to increase tolerance for functional mobility. On going  2.Pt to report ability to return to [employer] [place] with minimal to no complications indicating improved tolerance to activity. On going  3.Increased L knee flexion MMT 1 grade to increase tolerance for ADL and work activities.On going  4. Pt will report 82% ore more on FOTO knee survey  to demonstrate increase in LE function with every day tasks. On going  5. Pt to be Independent with HEP to improve ROM and independence with ADL's. On going     Plan   Plan of care Certification: 11/1/2024 to 1/10/25.      Sammy Edmonds, PTA   12/11/2024

## 2024-12-18 NOTE — PROGRESS NOTES
"  Physical Therapy Daily Treatment Note     Name: Sailaja Carbajal  Clinic Number: [id number]    Therapy Diagnosis:   No diagnosis found.      Physician: Ramonita Carbajal DC    Visit Date: 12/19/2024    Physician Orders: PT Eval and Treat   Medical Diagnosis from Referral: M25.562 (ICD-10-CM) - Left knee pain   Evaluation Date: 11/1/2024  Authorization Period Expiration: 10/30/25  Plan of Care Expiration:  01/10/24  Visit # / Visits authorized: 5/20  Progress Note Due:  1/05/25  FOTO: 1/ 1     Precautions: Standard     Time In: 200 pm  Time Out: 255 pm   Total Appointment Time (timed & untimed codes): 55 minutes    Subjective     Pt reports: Just stiffness which improves as she starts walking.     she was compliant with home exercise program given last session.   Response to previous treatment: no adverse effects  Functional change: Ongoing    Pain: 0/10  Location: left knee      Pts goals: "be confident I can get back on roller skate safely and workout like I use to"   Objective     Updated at Progress Report Unless Specified    Treatment     Sailaja received the following manual therapy techniques for 00 minutes:       Sailaja received therapeutic exercises to develop strength, endurance, ROM, and flexibility for 30 minutes including:     Elliptical x5  double limb bridge into hamstring curl, 2 x 10   Lateral step downs c/ RTB, 6 in, 2x10  TKE, RTB, 2x10x3"  Single leg bridge, 2x10x 3"  SL hip add (LLE) 2 x 10 2# - np  Sidelying hip abduction, 2x10x3", 2# - np  Standing fire hydrant, GTB, 2x10x3"  Single leg squat to chair, 2x10 + GTB pulling laterally  Squat with hip abd GTB at knees 2 x 10       Sailaja participated in dynamic functional therapeutic activities to improve functional performance for 00 minutes, including:      Sailaja participated in neuromuscular re-education activities to improve: Balance, Coordination, and Proprioception for 25 minutes. The following activities were " "included:    Lateral walks GTB x 3 laps   Single leg stance 10# kettlebell pass, 1'  SL lunge L with slider 2 x 5  (FWD,LATERAL/Reverse)  [place] split squat 2 x 10  ea (R leg elevated)  single leg balance on BOSU ball (ball up), 3x30"  hip abductor bridge 2x10x3"  Side step up 8 in 2x10x3"  +Banded (at ankles)Hip abd iso RTB on 6in step 3 x 40"  +standing captain zaidi with mini squat 2 x 10 ea    Home Exercises and Education Provided     Education provided:   - DOMS  - Progress towards goals     Written Home Exercises Provided: Patient instructed to cont prior HEP.  Exercises were reviewed and Sailaja was able to demonstrate them prior to the end of the session.  Sailaja demonstrated good  understanding of the HEP provided.   .   See EMR under Patient Instructions for exercises provided 11/1/2024.  Assessment     Sailaja tolerated session well. Pt continues with quad and glut weakness. Pt requires verbal and tactile cues for knee valgus. Progress as tolerated.     Sailaja is progressing well towards her goals and there are no updates to goals at this time. Pt prognosis is Good.     Pt will continue to benefit from skilled outpatient physical therapy to address the deficits listed in the problem list on initial evaluation provide pt/family education and to maximize pt's level of independence in the home and community environment.     Anticipated Barriers for therapy: none     Pt's spiritual, cultural and educational needs considered and pt agreeable to plan of care and goals.    GOALS: Short Term Goals:  4 weeks  1.Report decreased in pain at worse less than  <   / =  6  /10  to increase tolerance for functional mobility.On going  2. Pt will be able to negotiate stairs with minimal to no complications indicating improved tolerance to activity.On going  3. Increased L knee flexion MMT 1/2 grade to increase tolerance for ADL and work activities.On going  4. Pt to report ability to stand for 30 minutes or more with " minimal to no complications indicating improved tolerance to activity. On going   5. Pt to tolerate HEP to improve ROM and independence with ADL's.On going     Long Term Goals: 8 weeks  1.Report decreased in pain at worse less than  <   / =  3  /10  to increase tolerance for functional mobility. On going  2.Pt to report ability to return to [employer] [place] with minimal to no complications indicating improved tolerance to activity. On going  3.Increased L knee flexion MMT 1 grade to increase tolerance for ADL and work activities.On going  4. Pt will report 82% ore more on FOTO knee survey  to demonstrate increase in LE function with every day tasks. On going  5. Pt to be Independent with HEP to improve ROM and independence with ADL's. On going     Plan   Plan of care Certification: 11/1/2024 to 1/10/25.      Sammy Edmonds, PTA

## 2024-12-19 ENCOUNTER — TELEPHONE (OUTPATIENT)
Dept: SURGERY | Facility: CLINIC | Age: 19
End: 2024-12-19
Payer: COMMERCIAL

## 2024-12-19 ENCOUNTER — CLINICAL SUPPORT (OUTPATIENT)
Dept: REHABILITATION | Facility: OTHER | Age: 19
End: 2024-12-19
Payer: COMMERCIAL

## 2024-12-19 DIAGNOSIS — R29.898 DECREASED STRENGTH INVOLVING KNEE JOINT: Primary | ICD-10-CM

## 2024-12-19 DIAGNOSIS — M25.662 DECREASED RANGE OF MOTION OF LEFT LOWER EXTREMITY: ICD-10-CM

## 2024-12-19 PROCEDURE — 97110 THERAPEUTIC EXERCISES: CPT

## 2024-12-19 PROCEDURE — 97112 NEUROMUSCULAR REEDUCATION: CPT

## 2024-12-19 NOTE — PROGRESS NOTES
"  Physical Therapy Daily Treatment Note     Name: Sailaja Carbajal  Clinic Number: [id number]    Therapy Diagnosis:   Encounter Diagnoses   Name Primary?    Decreased strength involving knee joint Yes    Decreased range of motion of left lower extremity          Physician: Ramonita Carbajal DC    Visit Date: 12/19/2024    Physician Orders: PT Eval and Treat   Medical Diagnosis from Referral: M25.562 (ICD-10-CM) - Left knee pain   Evaluation Date: 11/1/2024  Authorization Period Expiration: 10/30/25  Plan of Care Expiration:  01/10/24  Visit # / Visits authorized: 6/20  Progress Note Due:  1/05/25  FOTO: 2/3     Precautions: Standard     Time In: 200 pm  Time Out: 255 pm   Total Appointment Time (timed & untimed codes): 55 minutes    Subjective     Pt reports: she feels good today there is no pain. She has been doing more strengthening at home and going to the gym.     she was compliant with home exercise program given last session.   Response to previous treatment: no adverse effects  Functional change: Ongoing    Pain: 0/10  Location: left knee      Pts goals: "be confident I can get back on roller skate safely and workout like I use to"   Objective     Updated at Progress Report Unless Specified    Treatment     Sailaja received the following manual therapy techniques for 00 minutes:       Sailaja received therapeutic exercises to develop strength, endurance, ROM, and flexibility for 30 minutes including:     Elliptical x5  double limb bridge into hamstring curl, 2 x 10   Lateral step downs c/ RTB, 6 in, 2x10  SAQ>SLR 2x10x5"  Single leg bridge, 2x10x 3"  SL hip add (LLE) 2 x 10 2# - np  Sidelying hip abduction, 2x10x3", 2# - np  Standing fire hydrant, GTB, 2x10x3"  Single leg squat to chair, 2x10 + GTB pulling laterally  Squat with hip abd GTB at knees 2 x 10       Sailaja participated in dynamic functional therapeutic activities to improve functional performance for 00 minutes, including:      Sailaja " "participated in neuromuscular re-education activities to improve: Balance, Coordination, and Proprioception for 25 minutes. The following activities were included:    Lateral walks GTB x 3 laps   Single leg stance 10# kettlebell pass, 1'  SL lunge L with slider 2 x 5  (FWD,LATERAL/Reverse)  [place] split squat 2 x 10  ea (R leg elevated)  single leg balance on BOSU ball (ball up), 3x30"  hip abductor bridge 2x10x3"  Side step up 8 in 2x10x3"  Banded (at ankles)Hip abd iso RTB on 6in step 3 x 40"  standing captain zaidi with mini squat 2 x 10 ea    Home Exercises and Education Provided     Education provided:   - DOMS  - Progress towards goals     Written Home Exercises Provided: Patient instructed to cont prior HEP.  Exercises were reviewed and Sailaja was able to demonstrate them prior to the end of the session.  Sailaja demonstrated good  understanding of the HEP provided.   .   See EMR under Patient Instructions for exercises provided 11/1/2024.  Assessment     Sailaja presents today without complaints of pain. Continuation and progression of LE strengthening, quad motor control, and dynamic balance exercises with good tolerance and no adverse effects. Continue to progress as able.     Sailaja is progressing well towards her goals and there are no updates to goals at this time. Pt prognosis is Good.     Pt will continue to benefit from skilled outpatient physical therapy to address the deficits listed in the problem list on initial evaluation provide pt/family education and to maximize pt's level of independence in the home and community environment.     Anticipated Barriers for therapy: none     Pt's spiritual, cultural and educational needs considered and pt agreeable to plan of care and goals.    GOALS: Short Term Goals:  4 weeks  1.Report decreased in pain at worse less than  <   / =  6  /10  to increase tolerance for functional mobility.On going  2. Pt will be able to negotiate stairs with minimal to no " complications indicating improved tolerance to activity.On going  3. Increased L knee flexion MMT 1/2 grade to increase tolerance for ADL and work activities.On going  4. Pt to report ability to stand for 30 minutes or more with minimal to no complications indicating improved tolerance to activity. On going   5. Pt to tolerate HEP to improve ROM and independence with ADL's.On going     Long Term Goals: 8 weeks  1.Report decreased in pain at worse less than  <   / =  3  /10  to increase tolerance for functional mobility. On going  2.Pt to report ability to return to [employer] [place] with minimal to no complications indicating improved tolerance to activity. On going  3.Increased L knee flexion MMT 1 grade to increase tolerance for ADL and work activities.On going  4. Pt will report 82% ore more on FOTO knee survey  to demonstrate increase in LE function with every day tasks. On going  5. Pt to be Independent with HEP to improve ROM and independence with ADL's. On going     Plan   Plan of care Certification: 11/1/2024 to 1/10/25.      Armando Oden, PT

## 2024-12-20 ENCOUNTER — PATIENT MESSAGE (OUTPATIENT)
Dept: OBSTETRICS AND GYNECOLOGY | Facility: CLINIC | Age: 19
End: 2024-12-20
Payer: COMMERCIAL

## 2024-12-30 NOTE — PROGRESS NOTES
"  Physical Therapy Daily Treatment Note     Name: Sailaja Carbajal  Clinic Number: [id number]    Therapy Diagnosis:   Encounter Diagnoses   Name Primary?    Decreased strength involving knee joint Yes    Decreased range of motion of left lower extremity            Physician: Ramonita Carbajal DC    Visit Date: 12/31/2024    Physician Orders: PT Eval and Treat   Medical Diagnosis from Referral: M25.562 (ICD-10-CM) - Left knee pain   Evaluation Date: 11/1/2024  Authorization Period Expiration: 10/30/25  Plan of Care Expiration:  01/10/24  Visit # / Visits authorized: 7/20  Progress Note Due:  1/05/25  FOTO: 2/3     Precautions: Standard     Time In: 200 pm  Time Out: 255 pm   Total Appointment Time (timed & untimed codes): 55 minutes    Subjective     Pt reports: she feels good today there is no pain. She has been doing more strengthening at home and going to the gym.     she was compliant with home exercise program given last session.   Response to previous treatment: no adverse effects  Functional change: Ongoing    Pain: 0/10  Location: left knee      Pts goals: "be confident I can get back on roller skate safely and workout like I use to"   Objective     Updated at Progress Report Unless Specified    Treatment     Sailaja received the following manual therapy techniques for 00 minutes:       Sailaja received therapeutic exercises to develop strength, endurance, ROM, and flexibility for 30 minutes including:     Elliptical x5  double limb bridge into hamstring curl, 2 x 10   Lateral step downs c/ RTB, 6 in, 2x10  SAQ>SLR 2x10x5"  Single leg bridge, 2x10x 3"  SL hip add (LLE) 2 x 10 2# - np  Sidelying hip abduction, 2x10x3", 2# - np  Standing fire hydrant, GTB, 2x10x3"  Single leg squat to chair, 2x10 + GTB pulling laterally  Squat with hip abd GTB at knees 2 x 10       Sailaja participated in dynamic functional therapeutic activities to improve functional performance for 00 minutes, including:      Sailaja " "participated in neuromuscular re-education activities to improve: Balance, Coordination, and Proprioception for 25 minutes. The following activities were included:    Lateral walks GTB x 3 laps   Single leg stance 10# kettlebell pass, 1'  SL lunge L with slider 2 x 5  (FWD,LATERAL/Reverse)  Wolof split squat 2 x 10  ea (R leg elevated)  single leg balance on BOSU ball (ball up), 3x30"  hip abductor bridge 2x10x3"  Side step up 8 in 2x10x3"  Banded (at ankles)Hip abd iso RTB on 6in step 3 x 40"  standing captain zaidi with mini squat 2 x 10 ea    Home Exercises and Education Provided     Education provided:   - DOMS  - Progress towards goals     Written Home Exercises Provided: Patient instructed to cont prior HEP.  Exercises were reviewed and Sailaaj was able to demonstrate them prior to the end of the session.  Sailaja demonstrated good  understanding of the HEP provided.   .   See EMR under Patient Instructions for exercises provided 11/1/2024.  Assessment     Sailaja presents today without complaints of pain. Continuation and progression of LE strengthening, quad motor control, and dynamic balance exercises with good tolerance and no adverse effects. Continue to progress as able.     Sailaja is progressing well towards her goals and there are no updates to goals at this time. Pt prognosis is Good.     Pt will continue to benefit from skilled outpatient physical therapy to address the deficits listed in the problem list on initial evaluation provide pt/family education and to maximize pt's level of independence in the home and community environment.     Anticipated Barriers for therapy: none     Pt's spiritual, cultural and educational needs considered and pt agreeable to plan of care and goals.    GOALS: Short Term Goals:  4 weeks  1.Report decreased in pain at worse less than  <   / =  6  /10  to increase tolerance for functional mobility.On going  2. Pt will be able to negotiate stairs with minimal to no " complications indicating improved tolerance to activity.On going  3. Increased L knee flexion MMT 1/2 grade to increase tolerance for ADL and work activities.On going  4. Pt to report ability to stand for 30 minutes or more with minimal to no complications indicating improved tolerance to activity. On going   5. Pt to tolerate HEP to improve ROM and independence with ADL's.On going     Long Term Goals: 8 weeks  1.Report decreased in pain at worse less than  <   / =  3  /10  to increase tolerance for functional mobility. On going  2.Pt to report ability to return to [employer] [place] with minimal to no complications indicating improved tolerance to activity. On going  3.Increased L knee flexion MMT 1 grade to increase tolerance for ADL and work activities.On going  4. Pt will report 82% ore more on FOTO knee survey  to demonstrate increase in LE function with every day tasks. On going  5. Pt to be Independent with HEP to improve ROM and independence with ADL's. On going     Plan   Plan of care Certification: 11/1/2024 to 1/10/25.      Sammy Edmonds, PTA

## 2024-12-31 ENCOUNTER — CLINICAL SUPPORT (OUTPATIENT)
Dept: REHABILITATION | Facility: OTHER | Age: 19
End: 2024-12-31
Payer: COMMERCIAL

## 2024-12-31 DIAGNOSIS — R29.898 DECREASED STRENGTH INVOLVING KNEE JOINT: Primary | ICD-10-CM

## 2024-12-31 DIAGNOSIS — M25.662 DECREASED RANGE OF MOTION OF LEFT LOWER EXTREMITY: ICD-10-CM

## 2024-12-31 PROCEDURE — 97112 NEUROMUSCULAR REEDUCATION: CPT | Mod: CQ

## 2024-12-31 PROCEDURE — 97110 THERAPEUTIC EXERCISES: CPT | Mod: CQ

## 2024-12-31 NOTE — PROGRESS NOTES
"  Physical Therapy Daily Treatment Note     Name: Sailaja Carbajal  Clinic Number: [id number]    Therapy Diagnosis:   Encounter Diagnoses   Name Primary?    Decreased strength involving knee joint Yes    Decreased range of motion of left lower extremity            Physician: Ramonita Carbajal DC    Visit Date: 12/31/2024    Physician Orders: PT Eval and Treat   Medical Diagnosis from Referral: M25.562 (ICD-10-CM) - Left knee pain   Evaluation Date: 11/1/2024  Authorization Period Expiration: 10/30/25  Plan of Care Expiration:  01/10/24  Visit # / Visits authorized: 7/20  Progress Note Due:  1/05/25  FOTO: 2/3     Precautions: Standard     Time In: 235 pm  Time Out: 330 pm   Total Appointment Time (timed & untimed codes): 55 minutes    Subjective     Pt reports: She is doing good. She has returned to skating though not at the der[place]. She did fall recently when skating backwards though she landed on buttocks and not knee. There was some exacerbation of knee pain following. She iced and it was better.    she was compliant with home exercise program given last session.   Response to previous treatment: no adverse effects  Functional change: Ongoing    Pain: 0/10  Location: left knee      Pts goals: "be confident I can get back on roller skate safely and workout like I use to"   Objective     Updated at Progress Report Unless Specified    Treatment     Sailaja received the following manual therapy techniques for 00 minutes:       Sailaja received therapeutic exercises to develop strength, endurance, ROM, and flexibility for 30 minutes including:     Elliptical x5  double limb bridge into hamstring curl, 2 x 10   Lateral step downs c/ RTB, 6 in, 2x10  SAQ>SLR 2x10x5"  Single leg bridge, 2x10x 3"    Standing fire hydrant, GTB, 2x10x3"  Single leg squat to chair, 2x10 + GTB pulling laterally  Squat with hip abd GTB at knees 2 x 10       Sailaja participated in dynamic functional therapeutic activities to improve " "functional performance for 00 minutes, including:      Sailaja participated in neuromuscular re-education activities to improve: Balance, Coordination, and Proprioception for 25 minutes. The following activities were included:    +bosu ball squats 2 x 10  Lateral walks GTB x 3 laps   Single leg stance 10# kettlebell pass, 1'  SL lunge L with slider 2 x 5  (FWD,LATERAL/Reverse)  Japanese split squat 2 x 10  ea (R leg elevated)  single leg balance on BOSU ball (ball up), 3x30"  hip abductor bridge 2x10x3" BTB  Side step up 8 in 2x10x3"  Banded (at ankles)Hip abd iso GTB with 12# on 6in step 3 x 40"  standing captain dejuan with mini squat 2 x 10 ea    Home Exercises and Education Provided     Education provided:   - DOMS  - Progress towards goals     Written Home Exercises Provided: Patient instructed to cont prior HEP.  Exercises were reviewed and Sailaja was able to demonstrate them prior to the end of the session.  Sailaja demonstrated good  understanding of the HEP provided.   .   See EMR under Patient Instructions for exercises provided 11/1/2024.  Assessment     Sailaja presents today without complaints of pain and pt has returned to skating. Pt continues with knee valgus requiring verbal and tactile cues. Will progress as tolerated.     Sailaja is progressing well towards her goals and there are no updates to goals at this time. Pt prognosis is Good.     Pt will continue to benefit from skilled outpatient physical therapy to address the deficits listed in the problem list on initial evaluation provide pt/family education and to maximize pt's level of independence in the home and community environment.     Anticipated Barriers for therapy: none     Pt's spiritual, cultural and educational needs considered and pt agreeable to plan of care and goals.    GOALS: Short Term Goals:  4 weeks  1.Report decreased in pain at worse less than  <   / =  6  /10  to increase tolerance for functional mobility.On going  2. Pt will " be able to negotiate stairs with minimal to no complications indicating improved tolerance to activity.On going  3. Increased L knee flexion MMT 1/2 grade to increase tolerance for ADL and work activities.On going  4. Pt to report ability to stand for 30 minutes or more with minimal to no complications indicating improved tolerance to activity. On going   5. Pt to tolerate HEP to improve ROM and independence with ADL's.On going     Long Term Goals: 8 weeks  1.Report decreased in pain at worse less than  <   / =  3  /10  to increase tolerance for functional mobility. On going  2.Pt to report ability to return to [employer] [place] with minimal to no complications indicating improved tolerance to activity. On going  3.Increased L knee flexion MMT 1 grade to increase tolerance for ADL and work activities.On going  4. Pt will report 82% ore more on FOTO knee survey  to demonstrate increase in LE function with every day tasks. On going  5. Pt to be Independent with HEP to improve ROM and independence with ADL's. On going     Plan   Plan of care Certification: 11/1/2024 to 1/10/25.      Sammy Edmonds, PTA

## 2025-01-10 NOTE — PROGRESS NOTES
"  Physical Therapy Daily Treatment Note     Name: Sailaja Carbajal  Clinic Number: [id number]    Therapy Diagnosis:   Encounter Diagnoses   Name Primary?    Decreased strength involving knee joint Yes    Decreased range of motion of lower extremity, unspecified laterality      Physician: Ramonita Carbajal DC    Visit Date: 1/13/2025    Physician Orders: PT Eval and Treat   Medical Diagnosis from Referral: M25.562 (ICD-10-CM) - Left knee pain   Evaluation Date: 11/1/2024  Authorization Period Expiration: 12/31/25  Plan of Care Expiration:  1/10/24 to 3/24/25  Visit # / Visits authorized: 1/20 (7 visits last referral) Progress Note Due:  2/13/25   FOTO: 3/3     Precautions: Standard     Time In: 4:20 pm  Time Out: 5:15 pm   Total Appointment Time (timed & untimed codes): 55 minutes    Subjective     Pt reports: that she overall has been feeling better and has been getting to the gym more. She feels more confident with her knees, but not back to 100%. She is still not confident with getting back to roller derby yet.   she was compliant with home exercise program given last session.   Response to previous treatment: no adverse effects  Functional change: improvement in stability    Pain: 0/10  Location: left knee      Pts goals: "be confident I can get back on roller skate safely and workout like I use to"   Objective     Updated at Progress Report on 1/13/25    GAIT DEVIATIONS: Sailaja displays normal gait     Range of Motion:   Knee Left active Left Passive   Flexion WFL WFL   Extension WFL WFL      Knee Right active Right Passive   Flexion WFL WFL   Extension WFL WFL         Lower Extremity Strength   Right LE   Left LE     Knee extension: 5/5 Knee extension: 4+/5   Knee flexion: 5/5 Knee flexion: 5/5   Hip flexion: 5/5 Hip flexion: 5/5   Hip extension:  5/5 Hip extension: 5/5   Hip abduction: 4+/5 Hip abduction: 4+/5   Hip adduction: 5/5 Hip adduction 5/5   Ankle dorsiflexion: 5/5 Ankle dorsiflexion: 5/5 "   Ankle plantarflexion: 5/5 Ankle plantarflexion: 5/5      R SL bridge: 44 seconds  L SL bridge: 28 seconds    SL hop test  R side average: 43.5 inches  L side average: 40.6 inches    Step down test: positive; not able to perform 6 inch step down without light UE support needed  Squat: normalPositive; L knee valgum   Single leg balance: Negative              R: 30 sec L: 30 sec      Joint Mobility: unremarkable        Palpation: tenderness to palpation detected along proximal and inferior patella tendon      CMS Impairment/Limitation/Restriction for FOTO Knee Survey     Therapist reviewed FOTO scores for Sailaja Carbajal on 1/13/2025.   FOTO documents entered into [employer] - see Media section.     Limitation Score: 55  Current Intake Score: 79  Goal Score: 82     Treatment   Next visit: more plyometric work    Sailaja received the following manual therapy techniques for 0 minutes:   NA today    Sailaja received therapeutic exercises to develop strength, endurance, ROM, and flexibility for 25 minutes including:  Elliptical x 6 min for joint nutrition  HEP review  Gym work out modification discussion  SL leg press 90# 3 x 5 reps on each side  Double limb bridge into hamstring curl, 2 x 10- NP   Lateral step downs c/ RTB, 6 in, 2x10- NP  Single leg bridge 10 reps x 10 second holds on each side  Standing fire hydrant, G TB 10 x 10 second holds  Single leg squat to chair, 2x10 + GTB pulling laterally- NP  Squat with hip abd GTB at knees 2 x 10- NP     Sailaja participated in dynamic functional therapeutic activities to improve functional performance for 30 minutes, including:  Reassessment  Functional tests  Step down 6 inch with yellow dole 3 x 5 reps on each side looking into mirror    Sailaja participated in neuromuscular re-education activities to improve: Balance, Coordination, and Proprioception for 0 minutes. The following activities were included:  NA today    Bosu ball squats 2 x 10  Lateral walks GTB x 3 laps  "  Single leg stance 10# kettlebell pass, 1'  SL lunge L with slider 2 x 5  (FWD,LATERAL/Reverse)  Tajik split squat 2 x 10  ea (R leg elevated)  single leg balance on BOSU ball (ball up), 3x30"  Side step up 8 in 2x10x3"  Banded (at ankles)Hip abd iso GTB with 12# on 6in step 3 x 40"  standing captain zaidi with mini squat 2 x 10 ea    Home Exercises and Education Provided     Education provided:   - DOMS  - Progress towards goals     Written Home Exercises Provided: Patient instructed to cont prior HEP.  Exercises were reviewed and Sailaja was able to demonstrate them prior to the end of the session.  Sailaja demonstrated good  understanding of the HEP provided.   .   See EMR under Patient Instructions for exercises provided 1/13/25.  Assessment   Patient has demonstrated significant improvement in pain/adverse symptoms, improvement in SL motor control and quad strength, improvement in quality of gait, and improvement in tolerance to activity. After performing SL hop test, she is still not within 10% of her uninvolved leg and is not ready to return to sport without increase in chance for reinjury. Sailaja needs continued skilled PT in order to improve full core/LE strength, improve SL motor control, improve tolerance to plyometric activities, and improve tolerance to all activities in order to return to sport safely and without increased risk for injury.    Sailaja is progressing well towards her goals and goals were updated on 1/13/15. Pt prognosis is Good.     Pt will continue to benefit from skilled outpatient physical therapy to address the deficits listed in the problem list on initial evaluation provide pt/family education and to maximize pt's level of independence in the home and community environment.     Anticipated Barriers for therapy: none     Pt's spiritual, cultural and educational needs considered and pt agreeable to plan of care and goals.    GOALS: Short Term Goals:  4 weeks  1.Report decreased in " pain at worse less than  <   / =  6  /10  to increase tolerance for functional mobility. MET  2. Pt will be able to negotiate stairs with minimal to no complications indicating improved tolerance to activity. MET  3. Increased L knee flexion MMT 1/2 grade to increase tolerance for ADL and work activities. MET  4. Pt to report ability to stand for 30 minutes or more with minimal to no complications indicating improved tolerance to activity. MET  5. Pt to tolerate HEP to improve ROM and independence with ADL's. MET     Long Term Goals: 8 weeks  1.Report decreased in pain at worse less than  <   / =  3  /10  to increase tolerance for functional mobility. MET  2.Pt to report ability to return to [employer] [place] with minimal to no complications indicating improved tolerance to activity. On going  3.Increased L knee flexion MMT 1 grade to increase tolerance for ADL and work activities. MET  4. Pt will report 82% ore more on FOTO knee survey  to demonstrate increase in LE function with every day tasks. On going  5. Pt to be Independent with HEP to improve ROM and independence with ADL's. On going     Plan   Plan of care Certification: 11/1/2024 to 1/10/25- updated to 3/24/25    Outpatient Physical Therapy 1 times weekly/every other week for 10 weeks to include the following interventions: Gait Training, Manual Therapy, Moist Heat/ Ice, Neuromuscular Re-ed, Patient Education, Therapeutic Activities, and Therapeutic Exercise. Juliana Last-Camila Kaiser, PT

## 2025-01-13 ENCOUNTER — CLINICAL SUPPORT (OUTPATIENT)
Dept: REHABILITATION | Facility: OTHER | Age: 20
End: 2025-01-13
Payer: COMMERCIAL

## 2025-01-13 DIAGNOSIS — R29.898 DECREASED STRENGTH INVOLVING KNEE JOINT: Primary | ICD-10-CM

## 2025-01-13 DIAGNOSIS — M25.669 DECREASED RANGE OF MOTION OF LOWER EXTREMITY, UNSPECIFIED LATERALITY: ICD-10-CM

## 2025-01-13 PROCEDURE — 97110 THERAPEUTIC EXERCISES: CPT

## 2025-01-13 PROCEDURE — 97530 THERAPEUTIC ACTIVITIES: CPT

## 2025-01-14 NOTE — PLAN OF CARE
Outpatient Therapy Updated Plan of Care     Visit Date: 1/13/2025    Name: Sailaja Carbajal  Clinic Number: [id number]    Therapy Diagnosis:   Encounter Diagnoses   Name Primary?    Decreased strength involving knee joint Yes    Decreased range of motion of lower extremity, unspecified laterality      Physician: Ramonita Carbajal DC  Physician Orders: PT Eval and Treat   Medical Diagnosis from Referral: M25.562 (ICD-10-CM) - Left knee pain   Evaluation Date: 11/1/2024  Authorization Period Expiration: 12/31/25  Plan of Care Expiration:  1/10/24 to 3/24/25  Visit # / Visits authorized: 1/20 (7 visits last referral) Progress Note Due:  2/13/25   FOTO: 3/3    Functional Level Prior to Evaluation:  IND     Subjective     Update: Pt reports: that she overall has been feeling better and has been getting to the gym more. She feels more confident with her knees, but not back to 100%. She is still not confident with getting back to roller derby yet.   she was compliant with home exercise program given last session.   Response to previous treatment: no adverse effects  Functional change: improvement in stability    Objective     Update: Updated at Progress Report on 1/13/25     GAIT DEVIATIONS: Sailaja displays normal gait     Range of Motion:   Knee Left active Left Passive   Flexion WFL WFL   Extension WFL WFL      Knee Right active Right Passive   Flexion WFL WFL   Extension WFL WFL         Lower Extremity Strength   Right LE   Left LE     Knee extension: 5/5 Knee extension: 4+/5   Knee flexion: 5/5 Knee flexion: 5/5   Hip flexion: 5/5 Hip flexion: 5/5   Hip extension:  5/5 Hip extension: 5/5   Hip abduction: 4+/5 Hip abduction: 4+/5   Hip adduction: 5/5 Hip adduction 5/5   Ankle dorsiflexion: 5/5 Ankle dorsiflexion: 5/5   Ankle plantarflexion: 5/5 Ankle plantarflexion: 5/5      R SL bridge: 44 seconds  L SL bridge: 28 seconds     SL hop test  R side average: 43.5 inches  L side average: 40.6 inches     Step  down test: positive; not able to perform 6 inch step down without light UE support needed  Squat: normalPositive; L knee valgum   Single leg balance: Negative              R: 30 sec L: 30 sec      Joint Mobility: unremarkable        Palpation: tenderness to palpation detected along proximal and inferior patella tendon      CMS Impairment/Limitation/Restriction for FOTO Knee Survey     Therapist reviewed FOTO scores for Sailaja Carbajal on 1/13/2025.   FOTO documents entered into [employer] - see Media section.     Limitation Score: 55  Current Intake Score: 79  Goal Score: 82     Assessment     Update: Patient has demonstrated significant improvement in pain/adverse symptoms, improvement in SL motor control and quad strength, improvement in quality of gait, and improvement in tolerance to activity. After performing SL hop test, she is still not within 10% of her uninvolved leg and is not ready to return to sport without increase in chance for reinjury. Sailaja needs continued skilled PT in order to improve full core/LE strength, improve SL motor control, improve tolerance to plyometric activities, and improve tolerance to all activities in order to return to sport safely and without increased risk for injury.     Goals Short Term Goals:  4 weeks  1.Report decreased in pain at worse less than  <   / =  6  /10  to increase tolerance for functional mobility. MET  2. Pt will be able to negotiate stairs with minimal to no complications indicating improved tolerance to activity. MET  3. Increased L knee flexion MMT 1/2 grade to increase tolerance for ADL and work activities. MET  4. Pt to report ability to stand for 30 minutes or more with minimal to no complications indicating improved tolerance to activity. MET  5. Pt to tolerate HEP to improve ROM and independence with ADL's. MET     Long Term Goals: 8 weeks  1.Report decreased in pain at worse less than  <   / =  3  /10  to increase tolerance for functional  "mobility. MET  2.Pt to report ability to return to [employer] [place] with minimal to no complications indicating improved tolerance to activity. On going  3.Increased L knee flexion MMT 1 grade to increase tolerance for ADL and work activities. MET  4. Pt will report 82% ore more on FOTO knee survey  to demonstrate increase in LE function with every day tasks. On going  5. Pt to be Independent with HEP to improve ROM and independence with ADL's. On going    Reasons for Recertification of Therapy:   Pt will continue to benefit from skilled outpatient physical therapy to address the deficits listed in the problem list on initial evaluation provide pt/family education and to maximize pt's level of independence in the home and community environment.     Plan     Updated Certification Period: 1/13/2025 to 3/24/25  Recommended Treatment Plan: 1 time a week/every other week for 10 weeks  Other Recommendations: NA    Outpatient physical therapy to include the following:   - Patient education  - Therapeutic exercise  - Manual therapy  - Performance testing   - Neuromuscular Re-education  - Therapeutic activity   - Modalities  - Dry Needling    Pt may be seen by PTA as part of the rehabilitation team.     Therapist: Sathya Kaiser, PT  1/14/2025    "I certify the need for these services furnished under this plan of treatment and while under my care."    ____________________________________  Physician/Referring Practitioner    _______________  Date of Signature    Sathya Kaiser, PT  1/13/2025      I CERTIFY THE NEED FOR THESE SERVICES FURNISHED UNDER THIS PLAN OF TREATMENT AND WHILE UNDER MY CARE    Physician's comments:        Physician's Signature: ___________________________________________________     "

## 2025-01-25 NOTE — PROGRESS NOTES
"  Physical Therapy Daily Treatment Note     Name: Sailaja Carbajal  Clinic Number: [id number]    Therapy Diagnosis:   Encounter Diagnosis   Name Primary?    Decreased strength involving knee joint Yes     Physician: Ramonita Carbajal DC    Visit Date: 1/27/2025    Physician Orders: PT Eval and Treat   Medical Diagnosis from Referral: M25.562 (ICD-10-CM) - Left knee pain   Evaluation Date: 11/1/2024  Authorization Period Expiration: 12/31/25  Plan of Care Expiration:  1/10/24 to 3/24/25  Visit # / Visits authorized: 2/20 (7 visits last referral) Progress Note Due:  2/13/25   FOTO: 3/3     Precautions: Standard     Time In: 4:25 pm  Time Out: 5:20 pm   Total Appointment Time (timed & untimed codes): 55 minutes    Subjective     Pt reports:   she was compliant with home exercise program given last session.   Response to previous treatment: no adverse effects  Functional change: improvement in stability    Pain: 0/10  Location: left knee      Pts goals: "be confident I can get back on roller skate safely and workout like I use to"   Objective     Updated at Progress Report unless specified    Treatment   Next visit: more plyometric work    Sailaja received the following manual therapy techniques for 0 minutes:   NA today    Sailaja received therapeutic exercises to develop strength, endurance, ROM, and flexibility for 25 minutes including:  LE bike x 5 min for joint nutrition  SL leg press 90# 3 x 5 reps on each side  Double limb bridge into hamstring curl, 2 x 10- NP   Lateral step downs c/ RTB, 6 in, 2x10- NP  Single leg bridge 10 reps x 10 second holds on each side  Standing fire hydrant, G TB 10 x 10 second holds  Single leg squat to chair, 2x10 + GTB pulling laterally- NP  Squat with hip abd GTB at knees 2 x 10- NP     Sailaja participated in dynamic functional therapeutic activities to improve functional performance for 30 minutes, including:  Reassessment  Functional tests  Step down 6 inch with yellow " "dole 3 x 5 reps on each side looking into mirror    Sailaja participated in neuromuscular re-education activities to improve: Balance, Coordination, and Proprioception for 0 minutes. The following activities were included:  NA today    Bosu ball squats 2 x 10  Lateral walks GTB x 3 laps   Single leg stance 10# kettlebell pass, 1'  SL lunge L with slider 2 x 5  (FWD,LATERAL/Reverse)  Dutch split squat 2 x 10  ea (R leg elevated)  single leg balance on BOSU ball (ball up), 3x30"  Side step up 8 in 2x10x3"  Banded (at ankles)Hip abd iso GTB with 12# on 6in step 3 x 40"  standing captain dejuan with mini squat 2 x 10 ea    Home Exercises and Education Provided     Education provided:   - DOMS  - Progress towards goals     Written Home Exercises Provided: Patient instructed to cont prior HEP.  Exercises were reviewed and Sailaja was able to demonstrate them prior to the end of the session.  Sailaja demonstrated good  understanding of the HEP provided.   .   See EMR under Patient Instructions for exercises provided 1/13/25.  Assessment       Sailaja is progressing well towards her goals and goals were updated on 1/13/15. Pt prognosis is Good.     Pt will continue to benefit from skilled outpatient physical therapy to address the deficits listed in the problem list on initial evaluation provide pt/family education and to maximize pt's level of independence in the home and community environment.     Anticipated Barriers for therapy: none     Pt's spiritual, cultural and educational needs considered and pt agreeable to plan of care and goals.    GOALS: Short Term Goals:  4 weeks  1.Report decreased in pain at worse less than  <   / =  6  /10  to increase tolerance for functional mobility. MET  2. Pt will be able to negotiate stairs with minimal to no complications indicating improved tolerance to activity. MET  3. Increased L knee flexion MMT 1/2 grade to increase tolerance for ADL and work activities. MET  4. Pt to report " ability to stand for 30 minutes or more with minimal to no complications indicating improved tolerance to activity. MET  5. Pt to tolerate HEP to improve ROM and independence with ADL's. MET     Long Term Goals: 8 weeks  1.Report decreased in pain at worse less than  <   / =  3  /10  to increase tolerance for functional mobility. MET  2.Pt to report ability to return to [employer] [place] with minimal to no complications indicating improved tolerance to activity. On going  3.Increased L knee flexion MMT 1 grade to increase tolerance for ADL and work activities. MET  4. Pt will report 82% ore more on FOTO knee survey  to demonstrate increase in LE function with every day tasks. On going  5. Pt to be Independent with HEP to improve ROM and independence with ADL's. On going     Plan   Plan of care Certification: 11/1/2024 to 1/10/25- updated to 3/24/25    Continue POC    Berhane-Camila Kaiser, PT

## 2025-01-27 ENCOUNTER — CLINICAL SUPPORT (OUTPATIENT)
Dept: REHABILITATION | Facility: OTHER | Age: 20
End: 2025-01-27
Payer: COMMERCIAL

## 2025-01-27 DIAGNOSIS — R29.898 DECREASED STRENGTH INVOLVING KNEE JOINT: Primary | ICD-10-CM

## 2025-01-27 PROCEDURE — 97110 THERAPEUTIC EXERCISES: CPT

## 2025-01-27 PROCEDURE — 97530 THERAPEUTIC ACTIVITIES: CPT

## 2025-01-27 NOTE — PROGRESS NOTES
"  Physical Therapy Daily Treatment Note     Name: Sailaja Carbajal  Clinic Number: [id number]    Therapy Diagnosis:   Encounter Diagnosis   Name Primary?    Decreased strength involving knee joint Yes     Physician: Ramonita Carbajal DC    Visit Date: 1/27/2025    Physician Orders: PT Eval and Treat   Medical Diagnosis from Referral: M25.562 (ICD-10-CM) - Left knee pain   Evaluation Date: 11/1/2024  Authorization Period Expiration: 12/31/25  Plan of Care Expiration:  1/10/24 to 3/24/25  Visit # / Visits authorized: 2/20 (7 visits last referral) Progress Note Due:  2/13/25   FOTO: 3/3     Precautions: Standard     Time In: 4:25 pm  Time Out: 5:20 pm   Total Appointment Time (timed & untimed codes): 55 minutes    Subjective     Pt reports: that she has been sick the last 2 weeks and been in bed a lot due to weakness and fatigue. She has not been able to do her exercises as much as she would have liked.  she was compliant with home exercise program given last session.   Response to previous treatment: no adverse effects  Functional change: ongoing    Pain: 0/10  Location: left knee      Pts goals: "be confident I can get back on roller skate safely and workout like I use to"   Objective     Updated at Progress Report unless specified    Treatment     Sailaja received the following manual therapy techniques for 0 minutes:   NA today    Sailaja received therapeutic exercises to develop strength, endurance, ROM, and flexibility for 30 minutes including:  LE bike x 6 min for joint nutrition  SL leg press 90# 3 x 8 reps on each side  Double limb bridge into hamstring curl, 2 x 10  Lateral step up c/ red thick back for control, 6 in, 2x10 each side  Single leg bridge 10 reps x 10 second holds on each side  Standing fire hydrant, G TB 10 x 10 second holds  Dynamic stretch with: quad pulls, knee up pull, hamstring sweep x 2 rounds  Single leg squat to chair, 2x10 + GTB pulling laterally- NP  Squat with hip abd GTB at " "knees 2 x 10- NP     Sailaja participated in dynamic functional therapeutic activities to improve functional performance for 25 minutes, including:  3 rounds  Step down 6 inch with yellow dole x 8 reps on each side looking into mirror  Skater hops down and back on striped floor  8 squat to stool + 2 foam pads with 20# KB  Forward jump up 6 inch step and step down    Sailaja participated in neuromuscular re-education activities to improve: Balance, Coordination, and Proprioception for 0 minutes. The following activities were included:  NA today    Bosu ball squats 2 x 10  Single leg stance 10# kettlebell pass, 1'  SL lunge L with slider 2 x 5  (FWD,LATERAL/Reverse)  [place]ovenian split squat 2 x 10  ea (R leg elevated)  single leg balance on BOSU ball (ball up), 3x30"  Banded (at ankles)Hip abd iso GTB with 12# on 6in step 3 x 40"  standing captain dejuan with mini squat 2 x 10 ea    Home Exercises and Education Provided     Education provided:   - DOMS  - Progress towards goals     Written Home Exercises Provided: Patient instructed to cont prior HEP.  Exercises were reviewed and Sailaja was able to demonstrate them prior to the end of the session.  Sailaja demonstrated good  understanding of the HEP provided.   .   See EMR under Patient Instructions for exercises provided 1/13/25.  Assessment   Due to patient being sick since last visit, did not perform functional hop test to return to sport. Good stability noted with skater hops and step downs and will continue to progress to more plyometric activities as able.    Sailaja is progressing well towards her goals and goals were updated on 1/13/15. Pt prognosis is Good.     Pt will continue to benefit from skilled outpatient physical therapy to address the deficits listed in the problem list on initial evaluation provide pt/family education and to maximize pt's level of independence in the home and community environment.     Anticipated Barriers for therapy: none     Pt's " spiritual, cultural and educational needs considered and pt agreeable to plan of care and goals.    GOALS: Short Term Goals:  4 weeks  1.Report decreased in pain at worse less than  <   / =  6  /10  to increase tolerance for functional mobility. MET  2. Pt will be able to negotiate stairs with minimal to no complications indicating improved tolerance to activity. MET  3. Increased L knee flexion MMT 1/2 grade to increase tolerance for ADL and work activities. MET  4. Pt to report ability to stand for 30 minutes or more with minimal to no complications indicating improved tolerance to activity. MET  5. Pt to tolerate HEP to improve ROM and independence with ADL's. MET     Long Term Goals: 8 weeks  1.Report decreased in pain at worse less than  <   / =  3  /10  to increase tolerance for functional mobility. MET  2.Pt to report ability to return to [employer] [place] with minimal to no complications indicating improved tolerance to activity. On going  3.Increased L knee flexion MMT 1 grade to increase tolerance for ADL and work activities. MET  4. Pt will report 82% ore more on FOTO knee survey  to demonstrate increase in LE function with every day tasks. On going  5. Pt to be Independent with HEP to improve ROM and independence with ADL's. On going     Plan   Plan of care Certification: 11/1/2024 to 1/10/25- updated to 3/24/25    Continue POC    Sathya Kaiser, PT

## 2025-02-07 NOTE — PROGRESS NOTES
"  Physical Therapy Daily Treatment Note     Name: Sailaja Carbajal  Clinic Number: [id number]    Therapy Diagnosis:   Encounter Diagnoses   Name Primary?    Decreased strength involving knee joint Yes    Decreased range of motion of left lower extremity        Physician: Ramonita Carbajal DC    Visit Date: 2/10/2025    Physician Orders: PT Eval and Treat   Medical Diagnosis from Referral: M25.562 (ICD-10-CM) - Left knee pain   Evaluation Date: 11/1/2024  Authorization Period Expiration: 12/31/25  Plan of Care Expiration:  1/10/24 to 3/24/25  Visit # / Visits authorized: 3/20 (7 visits last referral) Progress Note Due:  2/13/25   FOTO: 3/3     Precautions: Standard     Time In: 11:00 am  Time Out: 11:55 am   Total Appointment Time (timed & untimed codes): 55 minutes    Subjective     Pt reports: that she has been going to the gym and doing her strengthening work. She feels like she is recovered from her sickness a couple of weeks ago. She did some skating and felt more confident with it.  she was compliant with home exercise program given last session.   Response to previous treatment: no adverse effects  Functional change: ongoing    Pain: 0/10  Location: left knee      Pts goals: "be confident I can get back on roller skate safely and workout like I use to"   Objective     Updated at Progress Report unless specified    Single leg hop test on 2/10/25  R average: 49 inches  L average: 46.3 inches    Treatment     Sailaja received the following manual therapy techniques for 0 minutes:   NA today    Sailaja received therapeutic exercises to develop strength, endurance, ROM, and flexibility for 25 minutes including:  Elliptical x 6 min for joint nutrition  SL leg press 90# 3 x 10 reps on each side  Double limb bridge into hamstring curl, 2 x 15 reps  Standing hip FL/AB/EX x 10 pulses each direction x 5 rounds each side red TB around ankles  Lateral step up c/ red thick back for control, 6 in, 2x10 each side- " "NP  Single leg bridge 10 reps x 10 second holds on each side- NP doing at home  Standing fire hydrant, G TB 10 x 10 second holds- NP doing at home  Dynamic stretch with: quad pulls, knee up pull, hamstring sweep x 2 rounds  Single leg squat to chair, 2x10 + GTB pulling laterally- NP  Squat with hip abd GTB at knees 2 x 10- NP     Sailaja participated in dynamic functional therapeutic activities to improve functional performance for 30 minutes, including:  Single leg hop test  HEP Review  3 rounds  Skater hops down and back on striped floor  8 squat to stool + 1 foam pads with 30# KB  Jump down 8 inch step x 10 reps  Step down 6 inch 2 x 5 reps on each side looking into mirror    Sailaja participated in neuromuscular re-education activities to improve: Balance, Coordination, and Proprioception for 0 minutes. The following activities were included:  NA today    Bosu ball squats 2 x 10  Single leg stance 10# kettlebell pass, 1'  SL lunge L with slider 2 x 5  (FWD,LATERAL/Reverse)  Vietnamese split squat 2 x 10  ea (R leg elevated)  single leg balance on BOSU ball (ball up), 3x30"  Banded (at ankles)Hip abd iso GTB with 12# on 6in step 3 x 40"  standing captain zaidi with mini squat 2 x 10 ea    Home Exercises and Education Provided     Education provided:   - DOMS  - Progress towards goals     Written Home Exercises Provided: Patient instructed to cont prior HEP.  Exercises were reviewed and Sailaja was able to demonstrate them prior to the end of the session.  Sailaja demonstrated good  understanding of the HEP provided.   .   See EMR under Patient Instructions for exercises provided 2/10/25.  Assessment   Patient given part of [place] sports test exercise for mini squat pulses to help with quad fatigue after extended time. Will see back in 3 weeks to continue to challenge more dynamic exercises and access progress.    Sailaja is progressing well towards her goals and goals were updated on 1/13/15. Pt prognosis is Good. "     Pt will continue to benefit from skilled outpatient physical therapy to address the deficits listed in the problem list on initial evaluation provide pt/family education and to maximize pt's level of independence in the home and community environment.     Anticipated Barriers for therapy: none     Pt's spiritual, cultural and educational needs considered and pt agreeable to plan of care and goals.    GOALS: Short Term Goals:  4 weeks  1.Report decreased in pain at worse less than  <   / =  6  /10  to increase tolerance for functional mobility. MET  2. Pt will be able to negotiate stairs with minimal to no complications indicating improved tolerance to activity. MET  3. Increased L knee flexion MMT 1/2 grade to increase tolerance for ADL and work activities. MET  4. Pt to report ability to stand for 30 minutes or more with minimal to no complications indicating improved tolerance to activity. MET  5. Pt to tolerate HEP to improve ROM and independence with ADL's. MET     Long Term Goals: 8 weeks  1.Report decreased in pain at worse less than  <   / =  3  /10  to increase tolerance for functional mobility. MET  2.Pt to report ability to return to [employer] [place] with minimal to no complications indicating improved tolerance to activity. On going  3.Increased L knee flexion MMT 1 grade to increase tolerance for ADL and work activities. MET  4. Pt will report 82% ore more on FOTO knee survey  to demonstrate increase in LE function with every day tasks. On going  5. Pt to be Independent with HEP to improve ROM and independence with ADL's. On going     Plan   Plan of care Certification: 11/1/2024 to 1/10/25- updated to 3/24/25    Continue POC    Sathya Kaiser, PT

## 2025-02-10 ENCOUNTER — CLINICAL SUPPORT (OUTPATIENT)
Dept: REHABILITATION | Facility: OTHER | Age: 20
End: 2025-02-10
Payer: COMMERCIAL

## 2025-02-10 DIAGNOSIS — R29.898 DECREASED STRENGTH INVOLVING KNEE JOINT: Primary | ICD-10-CM

## 2025-02-10 DIAGNOSIS — M25.662 DECREASED RANGE OF MOTION OF LEFT LOWER EXTREMITY: ICD-10-CM

## 2025-02-10 PROCEDURE — 97110 THERAPEUTIC EXERCISES: CPT

## 2025-02-10 PROCEDURE — 97530 THERAPEUTIC ACTIVITIES: CPT

## 2025-02-17 ENCOUNTER — TELEPHONE (OUTPATIENT)
Dept: SURGERY | Facility: CLINIC | Age: 20
End: 2025-02-17
Payer: COMMERCIAL

## 2025-03-03 ENCOUNTER — CLINICAL SUPPORT (OUTPATIENT)
Dept: REHABILITATION | Facility: OTHER | Age: 20
End: 2025-03-03
Payer: COMMERCIAL

## 2025-03-03 DIAGNOSIS — R29.898 DECREASED STRENGTH INVOLVING KNEE JOINT: Primary | ICD-10-CM

## 2025-03-03 DIAGNOSIS — M25.669 DECREASED RANGE OF MOTION OF LOWER EXTREMITY, UNSPECIFIED LATERALITY: ICD-10-CM

## 2025-03-03 PROCEDURE — 97110 THERAPEUTIC EXERCISES: CPT

## 2025-03-03 PROCEDURE — 97530 THERAPEUTIC ACTIVITIES: CPT

## 2025-03-03 NOTE — PROGRESS NOTES
"  Physical Therapy Discharge/Daily Treatment Note     Name: Sailaja Carbajal  Clinic Number: [id number]    Therapy Diagnosis:   Encounter Diagnoses   Name Primary?    Decreased strength involving knee joint Yes    Decreased range of motion of lower extremity, unspecified laterality      Physician: Ramonita Carbajal DC    Visit Date: 3/3/2025    Physician Orders: PT Eval and Treat   Medical Diagnosis from Referral: M25.562 (ICD-10-CM) - Left knee pain   Evaluation Date: 11/1/2024  Authorization Period Expiration: 12/31/25  Plan of Care Expiration:  1/10/24 to 3/24/25  Visit # / Visits authorized: 4/20 (7 visits last referral)   FOTO: 4/4     Precautions: Standard     Time In: 2:30 pm  Time Out: 3:05 pm   Total Appointment Time (timed & untimed codes): 35 minutes    Subjective     Pt reports: that she is feeling great and was doing some skating at [employer] without any soreness and no increased fatigue. Weights have been doing well at the gym and has increased them as needed to.  she was compliant with home exercise program given last session.   Response to previous treatment: no adverse effects  Functional change: ongoing    Pain: 0/10  Location: left knee      Pts goals: "be confident I can get back on roller skate safely and workout like I use to"   Objective     Updated at Progress Report on 3/3/25    Single leg hop test on 2/10/25  R average: 49 inches  L average: 46.3 inches    Single leg hop test on 3/3/25  R average: 41 inches  L average: 41 inches    Lower Extremity Strength   Right LE   Left LE     Knee extension: 5/5 Knee extension: 5/5   Knee flexion: 5/5 Knee flexion: 5/5   Hip flexion: 5/5 Hip flexion: 5/5   Hip extension:  5/5 Hip extension: 5/5   Hip abduction: 4+/5 Hip abduction: 4+/5   Hip adduction: 5/5 Hip adduction 5/5   Ankle dorsiflexion: 5/5 Ankle dorsiflexion: 5/5   Ankle plantarflexion: 5/5 Ankle plantarflexion: 5/5      R SL bridge: 60 seconds  SL squat part of sports cord test: " minimal instability, but needs more work to return to sport safely  L SL bridge: 60 seconds  Squat: normal      CMS Impairment/Limitation/Restriction for FOTO Knee Survey     Therapist reviewed FOTO scores for Sailaja Carbajal on 3/33/2025.   FOTO documents entered into [employer] - see Media section.     Limitation Score: 55  Current Intake Score: 86  Goal Score: 82     Treatment     Sailaja received the following manual therapy techniques for 0 minutes:   NA today    Sailaja received therapeutic exercises to develop strength, endurance, ROM, and flexibility for 12 minutes including:  Treadmill x 6 min for joint nutrition  Dynamic stretch with: quad pulls, knee up pull, hamstring sweep x 2 rounds    Sailaja participated in dynamic functional therapeutic activities to improve functional performance for 23 minutes, including:  Single leg hop test  HEP Review  Work out modifications  SL squat sports test    Home Exercises and Education Provided     Education provided:   - DOMS  - Progress towards goals     Written Home Exercises Provided:   Exercises were reviewed and Sailaja was able to demonstrate them prior to the end of the session.  Sailaja demonstrated good  understanding of the HEP provided.   .   See EMR under Patient Instructions for exercises provided 3/3/25.  Assessment   Patient has demonstrated significant improvement in pain/adverse symptoms, improvement in strength/SL motor control, improvement in tolerance to activity, and improvement in quality of dynamic movements. SL hop test both legs are equal with some instability noted after 40 seconds of 1 minute sL squat test on L side. However, a lot more instabilty noted on her non involved side. Patient educated to continue with SL squats and increased to 40 seconds on L LE and start with 10-15 seconds on the R LE in order to improve the non involved side as well. Patient is ready for discharge with updated HEP.    Sailaja is progressing well towards her  goals and goals were updated on 3/3/15. Pt prognosis is Good.     Anticipated Barriers for therapy: none     Pt's spiritual, cultural and educational needs considered and pt agreeable to plan of care and goals.    GOALS: Short Term Goals:  4 weeks  1.Report decreased in pain at worse less than  <   / =  6  /10  to increase tolerance for functional mobility. MET  2. Pt will be able to negotiate stairs with minimal to no complications indicating improved tolerance to activity. MET  3. Increased L knee flexion MMT 1/2 grade to increase tolerance for ADL and work activities. MET  4. Pt to report ability to stand for 30 minutes or more with minimal to no complications indicating improved tolerance to activity. MET  5. Pt to tolerate HEP to improve ROM and independence with ADL's. MET     Long Term Goals: 8 weeks  1.Report decreased in pain at worse less than  <   / =  3  /10  to increase tolerance for functional mobility. MET  2.Pt to report ability to return to [employer] [place] with minimal to no complications indicating improved tolerance to activity. MET  3.Increased L knee flexion MMT 1 grade to increase tolerance for ADL and work activities. MET  4. Pt will report 82% ore more on FOTO knee survey  to demonstrate increase in LE function with every day tasks. MET  5. Pt to be Independent with HEP to improve ROM and independence with ADL's. MET     Plan   Discharged with HEP    Sathya Kaiser, PT

## 2025-04-25 ENCOUNTER — OFFICE VISIT (OUTPATIENT)
Dept: OBSTETRICS AND GYNECOLOGY | Facility: CLINIC | Age: 20
End: 2025-04-25
Payer: COMMERCIAL

## 2025-04-25 DIAGNOSIS — N89.8 VAGINAL DISCHARGE: ICD-10-CM

## 2025-04-25 DIAGNOSIS — N76.0 VULVOVAGINITIS: Primary | ICD-10-CM

## 2025-04-25 PROCEDURE — 87798 DETECT AGENT NOS DNA AMP: CPT | Performed by: STUDENT IN AN ORGANIZED HEALTH CARE EDUCATION/TRAINING PROGRAM

## 2025-04-25 PROCEDURE — 99999 PR PBB SHADOW E&M-EST. PATIENT-LVL II: CPT | Mod: PBBFAC,,, | Performed by: STUDENT IN AN ORGANIZED HEALTH CARE EDUCATION/TRAINING PROGRAM

## 2025-04-25 PROCEDURE — 87563 M. GENITALIUM AMP PROBE: CPT | Performed by: STUDENT IN AN ORGANIZED HEALTH CARE EDUCATION/TRAINING PROGRAM

## 2025-04-25 RX ORDER — DIAZEPAM 5 MG/1
5 TABLET ORAL 2 TIMES DAILY PRN
COMMUNITY
Start: 2025-02-12

## 2025-04-25 RX ORDER — HYDROCORTISONE 25 MG/G
CREAM TOPICAL 2 TIMES DAILY
COMMUNITY
Start: 2024-12-15

## 2025-04-25 RX ORDER — GUANFACINE 1 MG/1
1 TABLET, EXTENDED RELEASE ORAL
COMMUNITY
Start: 2025-03-26

## 2025-04-25 NOTE — PROGRESS NOTES
OBSTETRICS AND GYNECOLOGY    Subjective:      Chief Complaint: VV    HPI:  Sailaja Carbajal is an 19 y.o.  presenting with concerns of possible vulvovaginitis. Persistent and chronic vaginal discharge. Has been evaluated multiple times and tried different treatments, to no avail. No identifiable trigger. No new products. The moisture and discharge leads to discomfort, need for frequent underwear changing. Can even cause a rash from the irritation. Associated odor. Discharge is thick, white in color. Has not noted improvement from any of the previous treatments tried. No additional complaints.    OB History    Para Term  AB Living   0 0 0 0 0 0   SAB IAB Ectopic Multiple Live Births   0 0 0 0 0     Past Medical History:   Diagnosis Date    Anxiety     Depression      Past Surgical History:   Procedure Laterality Date    TONSILLECTOMY, ADENOIDECTOMY Bilateral 2023    Procedure: TONSILLECTOMY AND ADENOIDECTOMY;  Surgeon: Liliana Villafana MD;  Location: [facility];  Service: ENT;  Laterality: Bilateral;  T&A set; suction bovie     Family History   Problem Relation Name Age of Onset    No Known Problems Father      No Known Problems Mother      Breast cancer Neg Hx      Colon cancer Neg Hx      Ovarian cancer Neg Hx         Allergies: Review of patient's allergies indicates:  No Known Allergies    Medications:   Current Medications[1]    Social History     Tobacco Use    Smoking status: Never     Passive exposure: Never    Smokeless tobacco: Never   Substance Use Topics    Alcohol use: Yes       Objective:     Physical Exam    GENERAL: Alert, well dressed, well nourished. Appropriate mood and affect. Pleasant.  HEENT: Normocephalic, atraumatic. Extraocular movements intact. Hearing and vision grossly intact.   PULMONARY: No respiratory distress. No use of accessory muscles of respiration. No cyanosis. Speaking comfortably in full sentences.   CARDIAC: Well perfused.     EXTREMITIES: No  edema. No visible rashes.     PELVIC:   EXTERNAL GENITALIA: No lesions or masses, non-erythematous.  URETHRA: Patent, no discharge.   VAGINA: Pink, moist, rugated, small amount of white/clear discharge.   CERVIX: Nulliparous, no lesions or masses, os closed, no blood or discharge per os, no cervical motion tenderness.     Vaginal discharge pH ~5 from nitrazine paper.    Assessment/Plan:     Problem List Items Addressed This Visit       Vaginal discharge    Relevant Orders    Vaginosis Screen by DNA Probe    C. trachomatis/N. gonorrhoeae by AMP DNA [facility]; Cervicovaginal    Ureaplasma PCR    Mycoplasma genitalium Molecular Detection, PCR Urine     Other Visit Diagnoses         Vulvovaginitis    -  Primary    Relevant Orders    Vaginosis Screen by DNA Probe          - Ddx reviewed  - Affirm, GCCT, ureaplasma/mycoplasma testing today  - Follow up results, for plan/treatment course  Consider prolonged clinda course/DIV treatment pending the above    As of April 1, 2021, the Cures Act has been passed nationally. This new law requires that all doctors progress notes, lab results, pathology reports and radiology reports be released IMMEDIATELY to the patient in the patient portal. That means that the results are released to you at the EXACT same time they are released to me. Therefore, with all of the patients that I have I am not able to reply to each patient exactly when the results come in. So there will be a delay from when you see the results to when I see them and have time to come up with a response to send you. Also I only see these results when I am on the computer at work. So if the results come in over the weekend or after 5 pm of a work day, I will not see them until the next business day. As you can tell, this is a challenge as a physician to give every patient the quick response they hope for and deserve. So please be patient!   Thanks for your understanding and patience.           [1]   Current Outpatient  Medications   Medication Sig Dispense Refill    cloNIDine (CATAPRES) 0.1 MG tablet Take 0.1 mg by mouth 2 (two) times daily.      diazePAM (VALIUM) 5 MG tablet Take 5 mg by mouth 2 (two) times daily as needed.      guanFACINE 1 mg Tb24 Take 1 tablet by mouth.      hydrocortisone (ANUSOL-HC) 2.5 % rectal cream Place rectally 2 (two) times daily.      OLANZapine (ZYPREXA) 10 MG tablet Take 10 mg by mouth every evening.      ondansetron (ZOFRAN-ODT) 4 MG TbDL Take 2 tablets (8 mg total) by mouth every 8 (eight) hours as needed (nausea). 10 tablet 0     Current Facility-Administered Medications   Medication Dose Route Frequency Provider Last Rate Last Admin    hydrocodone-apap 7.5-325 MG/15 ML oral solution 15 mL  15 mL Oral Q4H Liliana Kwok MD

## 2025-04-28 ENCOUNTER — RESULTS FOLLOW-UP (OUTPATIENT)
Dept: OBSTETRICS AND GYNECOLOGY | Facility: CLINIC | Age: 20
End: 2025-04-28

## 2025-04-28 DIAGNOSIS — N76.0 BACTERIAL VAGINAL INFECTION: Primary | ICD-10-CM

## 2025-04-28 DIAGNOSIS — B96.89 BACTERIAL VAGINAL INFECTION: Primary | ICD-10-CM

## 2025-04-29 RX ORDER — DOXYCYCLINE HYCLATE 100 MG
100 TABLET ORAL EVERY 12 HOURS
Qty: 14 TABLET | Refills: 0 | Status: SHIPPED | OUTPATIENT
Start: 2025-04-29 | End: 2025-05-06

## 2025-05-21 NOTE — TELEPHONE ENCOUNTER
Pt finished Doxy 14 days ago and still having symptoms.  Should she come back in for more swabs or does she need another round of antibiotics?

## 2025-05-23 RX ORDER — CLINDAMYCIN PHOSPHATE 20 MG/G
CREAM VAGINAL NIGHTLY
Qty: 40 G | Refills: 0 | Status: SHIPPED | OUTPATIENT
Start: 2025-05-23

## 2025-05-23 RX ORDER — TERCONAZOLE 4 MG/G
1 CREAM VAGINAL NIGHTLY
Qty: 45 G | Refills: 0 | Status: CANCELLED | OUTPATIENT
Start: 2025-05-23

## 2025-06-12 ENCOUNTER — PATIENT MESSAGE (OUTPATIENT)
Dept: OBSTETRICS AND GYNECOLOGY | Facility: CLINIC | Age: 20
End: 2025-06-12
Payer: COMMERCIAL

## 2025-06-13 RX ORDER — CLINDAMYCIN PHOSPHATE 20 MG/G
CREAM VAGINAL NIGHTLY
Qty: 40 G | Refills: 0 | Status: SHIPPED | OUTPATIENT
Start: 2025-06-13

## 2025-07-04 ENCOUNTER — PATIENT MESSAGE (OUTPATIENT)
Dept: OBSTETRICS AND GYNECOLOGY | Facility: CLINIC | Age: 20
End: 2025-07-04
Payer: COMMERCIAL

## 2025-07-31 ENCOUNTER — OFFICE VISIT (OUTPATIENT)
Dept: OBSTETRICS AND GYNECOLOGY | Facility: CLINIC | Age: 20
End: 2025-07-31
Attending: STUDENT IN AN ORGANIZED HEALTH CARE EDUCATION/TRAINING PROGRAM
Payer: COMMERCIAL

## 2025-07-31 VITALS
WEIGHT: 152.13 LBS | SYSTOLIC BLOOD PRESSURE: 114 MMHG | DIASTOLIC BLOOD PRESSURE: 72 MMHG | HEIGHT: 66 IN | BODY MASS INDEX: 24.45 KG/M2

## 2025-07-31 DIAGNOSIS — N89.8 VAGINAL ODOR: Primary | ICD-10-CM

## 2025-07-31 DIAGNOSIS — N89.8 VAGINAL DISCHARGE: ICD-10-CM

## 2025-07-31 PROCEDURE — 1160F RVW MEDS BY RX/DR IN RCRD: CPT | Mod: CPTII,S$GLB,, | Performed by: STUDENT IN AN ORGANIZED HEALTH CARE EDUCATION/TRAINING PROGRAM

## 2025-07-31 PROCEDURE — 3008F BODY MASS INDEX DOCD: CPT | Mod: CPTII,S$GLB,, | Performed by: STUDENT IN AN ORGANIZED HEALTH CARE EDUCATION/TRAINING PROGRAM

## 2025-07-31 PROCEDURE — 3074F SYST BP LT 130 MM HG: CPT | Mod: CPTII,S$GLB,, | Performed by: STUDENT IN AN ORGANIZED HEALTH CARE EDUCATION/TRAINING PROGRAM

## 2025-07-31 PROCEDURE — 3044F HG A1C LEVEL LT 7.0%: CPT | Mod: CPTII,S$GLB,, | Performed by: STUDENT IN AN ORGANIZED HEALTH CARE EDUCATION/TRAINING PROGRAM

## 2025-07-31 PROCEDURE — 87798 DETECT AGENT NOS DNA AMP: CPT | Performed by: STUDENT IN AN ORGANIZED HEALTH CARE EDUCATION/TRAINING PROGRAM

## 2025-07-31 PROCEDURE — 99999 PR PBB SHADOW E&M-EST. PATIENT-LVL III: CPT | Mod: PBBFAC,,, | Performed by: STUDENT IN AN ORGANIZED HEALTH CARE EDUCATION/TRAINING PROGRAM

## 2025-07-31 PROCEDURE — 3078F DIAST BP <80 MM HG: CPT | Mod: CPTII,S$GLB,, | Performed by: STUDENT IN AN ORGANIZED HEALTH CARE EDUCATION/TRAINING PROGRAM

## 2025-07-31 PROCEDURE — 1159F MED LIST DOCD IN RCRD: CPT | Mod: CPTII,S$GLB,, | Performed by: STUDENT IN AN ORGANIZED HEALTH CARE EDUCATION/TRAINING PROGRAM

## 2025-07-31 PROCEDURE — 99213 OFFICE O/P EST LOW 20 MIN: CPT | Mod: S$GLB,,, | Performed by: STUDENT IN AN ORGANIZED HEALTH CARE EDUCATION/TRAINING PROGRAM

## 2025-07-31 NOTE — PROGRESS NOTES
"Chief Complaint: Vaginal discharge     HPI:      Sailaja Carbajal is a 20 y.o.  who presents today for vaginal discharge.  Pt with similar symptoms when diagnosed with Ureaplasma in 2025. She did full course of doxycycline without relief and then did full vaginal clindamycin course with relief for only 2 days. Then her symptoms returned. Reports white, thick, mucus-like discharge with occasional itching/irritation. She has one single female sexual partner, her partner was not treated and has no symptoms. She otherwise denies pelvic pain or additional concerns.      Patient's last menstrual period was 07/10/2025 (exact date).     Physical Exam:      PHYSICAL EXAM:  /72   Ht 5' 6" (1.676 m)   Wt 69 kg (152 lb 1.9 oz)   LMP 07/10/2025 (Exact Date)   BMI 24.55 kg/m²   Body mass index is 24.55 kg/m².     APPEARANCE: Well nourished, well developed, in no acute distress.  PELVIC:  External genitalia and urethra within normal limits. Vagina without lesions, with thick clear/white discharge, without erythema, without ulcers.  Cervix without cervical motion tenderness, non-friable. Uterus: normal size, mobile, non-tender.  No adnexal masses or tenderness palpated.    Assessment/Plan:     Vaginal odor  -     Ureaplasma PCR; Future; Expected date: 2025  -     Vaginosis Screen by DNA Probe    Vaginal discharge  -     Ureaplasma PCR; Future; Expected date: 2025  -     Vaginosis Screen by DNA Probe      - no overt signs of infxn on pelvic exam today, repeat Ureaplasma and affirm swabs were collected, discussed if positive then will plan for partner be treated as well - she voiced understanding  - rtc pending above results     "

## 2025-08-05 ENCOUNTER — RESULTS FOLLOW-UP (OUTPATIENT)
Dept: OBSTETRICS AND GYNECOLOGY | Facility: CLINIC | Age: 20
End: 2025-08-05
Payer: COMMERCIAL

## 2025-08-05 DIAGNOSIS — N76.1 SUBACUTE VAGINITIS: Primary | ICD-10-CM

## 2025-08-05 RX ORDER — AZITHROMYCIN 500 MG/1
1000 TABLET, FILM COATED ORAL ONCE
Qty: 2 TABLET | Refills: 0 | Status: SHIPPED | OUTPATIENT
Start: 2025-08-05 | End: 2025-08-05

## 2025-08-05 RX ORDER — METRONIDAZOLE 500 MG/1
500 TABLET ORAL 2 TIMES DAILY
Qty: 14 TABLET | Refills: 0 | Status: SHIPPED | OUTPATIENT
Start: 2025-08-05

## 2025-08-05 RX ORDER — AZITHROMYCIN 500 MG/1
1000 TABLET, FILM COATED ORAL ONCE
Qty: 1 TABLET | Refills: 0 | Status: SHIPPED | OUTPATIENT
Start: 2025-08-05 | End: 2025-08-05

## 2025-08-05 NOTE — TELEPHONE ENCOUNTER
Please call in the following Rx for patient's partner to pharmacy she provided.  Azithromycin 1000 mg x 1 dose, 500 mg tablets disp #2, no refill  Flagyl 500 mg BID x 7 days, 500 mg tablets disp #14, no refill    Thanks!

## 2025-08-06 ENCOUNTER — TELEPHONE (OUTPATIENT)
Dept: OBSTETRICS AND GYNECOLOGY | Facility: CLINIC | Age: 20
End: 2025-08-06
Payer: COMMERCIAL

## 2025-08-11 ENCOUNTER — PATIENT MESSAGE (OUTPATIENT)
Dept: OBSTETRICS AND GYNECOLOGY | Facility: CLINIC | Age: 20
End: 2025-08-11
Payer: COMMERCIAL

## 2025-08-23 ENCOUNTER — PATIENT MESSAGE (OUTPATIENT)
Dept: OBSTETRICS AND GYNECOLOGY | Facility: CLINIC | Age: 20
End: 2025-08-23
Payer: COMMERCIAL

## (undated) DEVICE — ELECTRODE REM PLYHSV RETURN 9

## (undated) DEVICE — KIT ANTIFOG

## (undated) DEVICE — TAPE CURAD SILK ADH 1INX1.5YD

## (undated) DEVICE — MANIFOLD 4 PORT

## (undated) DEVICE — SUT PLN GUT 2-0 CT-3 1X27

## (undated) DEVICE — CATH RED RUBBER 8FR

## (undated) DEVICE — SPONGE TONSIL MEDIUM

## (undated) DEVICE — ELECTRODE NEEDLE 2.8IN

## (undated) DEVICE — NDL HYPO REG 25G X 1 1/2

## (undated) DEVICE — SUPPORT ULNA NERVE PROTECTOR

## (undated) DEVICE — CATH SUCTION 14FR CONTROL

## (undated) DEVICE — PACK HEAD & NECK

## (undated) DEVICE — GLOVE SURGICAL LATEX SZ 6.5

## (undated) DEVICE — SYR 10CC LUER LOCK

## (undated) DEVICE — TUBING SUC UNIV W/CONN 12FT

## (undated) DEVICE — CATH ALL PUR URTHL RR 10FR

## (undated) DEVICE — SUCTION COAGULATOR 10FR 6IN

## (undated) DEVICE — CONTAINER MULTIPURPOSE/SPECIME

## (undated) DEVICE — GOWN POLY REINF BRTH SLV LG

## (undated) DEVICE — TOWEL OR DISP STRL BLUE 4/PK

## (undated) DEVICE — PACK SURGERY START